# Patient Record
Sex: MALE | Race: WHITE | NOT HISPANIC OR LATINO | Employment: OTHER | URBAN - METROPOLITAN AREA
[De-identification: names, ages, dates, MRNs, and addresses within clinical notes are randomized per-mention and may not be internally consistent; named-entity substitution may affect disease eponyms.]

---

## 2017-02-17 ENCOUNTER — DOCUMENTATION ONLY (OUTPATIENT)
Dept: VASCULAR SURGERY | Facility: CLINIC | Age: 72
End: 2017-02-17

## 2017-06-12 NOTE — PROGRESS NOTES
"  SUBJECTIVE:                                                    Iam Kaur is a 72 year old male who presents to clinic today for the following health issues:      HPI  He has a history of chronic loose stools, 5-10 small stools per day, usually formed but can be loose, for many years since his rectal cancer 17 years ago. He takes Lomotil as needed, usually 3 times weekly which does help decrease the amount of stools he is having. He also associates more frequent bowel movements with fatty, spicy foods. He denies any associated nausea, vomiting, or abdominal pain. He does continue to drink 3 [place]/water glasses per day but states he has cut back.     He has an occasional flare of low back pain and sciatica but this will improve with diclofenac which he only takes as needed.     Problem list and histories reviewed & adjusted, as indicated.  Additional history: none    ROS:  GENERAL: Denies fever, fatigue, weakness, weight gain, or weight loss.  CARDIOVASCULAR: Denies chest pain, shortness of breath, irregular heartbeats,  palpitations, or edema.  RESPIRATORY: Denies cough, hemoptysis, and shortness of breath.  GASTROINTESTINAL: +5-10 bowel movements per day. Denies nausea, vomiting, change in appetite, abdominal pain, or constipation.  GENITOURINARY: Denies increased frequency, urgency, dysuria, hematuria, or incontinence.   MUSCULOSKELETAL: +Intermittent low back pain.   NEUROLOGIC: Denies headache, fainting, dizziness, memory loss, numbness, tingling, or seizures.    OBJECTIVE:                                                    /80  Pulse 92  Temp 98.5  F (36.9  C) (Temporal)  Resp 16  Ht 5' 7\" (1.702 m)  Wt 231 lb (104.8 kg)  BMI 36.18 kg/m2  Body mass index is 36.18 kg/(m^2).  GENERAL: healthy, alert and no distress  RESP: lungs clear to auscultation - no rales, rhonchi or wheezes  CV: regular rate and rhythm, normal S1 S2, no S3 or S4, no murmur, click or rub  ABDOMEN: soft, nontender, no " hepatosplenomegaly, no masses and bowel sounds normal  NEURO: Normal strength and tone, mentation intact and speech normal. Cranial nerves II-XII are grossly intact. DTRs are 2+/4 throughout and symmetric. Gait is stable.        ASSESSMENT/PLAN:                                                        ICD-10-CM    1. Frequent bowel movements R19.4    2. Chronic diarrhea K52.9 diphenoxylate-atropine (LOMOTIL) 2.5-0.025 MG per tablet   3. 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 Essential hypertension with goal blood pressure less than 140/90 I10 lisinopril (PRINIVIL/ZESTRIL) 10 MG tablet     Comprehensive metabolic panel (BMP + Alb, Alk Phos, ALT, AST, Total. Bili, TP)     CANCELED: BASIC METABOLIC PANEL   5. Alcohol use Z78.9    6. Malignant neoplasm of rectum (H) C20    7. Obesity, unspecified obesity severity, unspecified obesity type E66.9        1-2, 5-6. These symptoms have been occurring for years (since his rectal cancer surgery) and seems to be exacerbated with fatty, spicy foods so I recommend limit these types of foods. He had a normal colonoscopy last year with a benign polyp. I do recommend he cut back on his alcohol use as this can also worsen his bowel symptoms. He will continue with Lomotil as needed as this works well.    3. Stable, continue diclofenac as needed for pain flares.    4. Stable, continue current medications. Non-fasting CMP ordered.    7. Discussed the importance of a healthier, low fat diet with 3-5 days per week of exercise.    Follow up in 5-6 months for annual physical.     Barak Cameron PA-C  [facility]

## 2017-06-14 ENCOUNTER — OFFICE VISIT (OUTPATIENT)
Dept: FAMILY MEDICINE | Facility: OTHER | Age: 72
End: 2017-06-14
Payer: COMMERCIAL

## 2017-06-14 VITALS
TEMPERATURE: 98.5 F | BODY MASS INDEX: 36.26 KG/M2 | WEIGHT: 231 LBS | RESPIRATION RATE: 16 BRPM | HEART RATE: 92 BPM | DIASTOLIC BLOOD PRESSURE: 80 MMHG | SYSTOLIC BLOOD PRESSURE: 130 MMHG | HEIGHT: 67 IN

## 2017-06-14 DIAGNOSIS — C20 MALIGNANT NEOPLASM OF RECTUM (H): ICD-10-CM

## 2017-06-14 DIAGNOSIS — K52.9 CHRONIC DIARRHEA: ICD-10-CM

## 2017-06-14 DIAGNOSIS — M54.17 LUMBOSACRAL NEURITIS: ICD-10-CM

## 2017-06-14 DIAGNOSIS — E66.9 OBESITY, UNSPECIFIED OBESITY SEVERITY, UNSPECIFIED OBESITY TYPE: ICD-10-CM

## 2017-06-14 DIAGNOSIS — Z78.9 ALCOHOL USE: ICD-10-CM

## 2017-06-14 DIAGNOSIS — R19.4 FREQUENT BOWEL MOVEMENTS: Primary | ICD-10-CM

## 2017-06-14 DIAGNOSIS — I10 ESSENTIAL HYPERTENSION WITH GOAL BLOOD PRESSURE LESS THAN 140/90: ICD-10-CM

## 2017-06-14 LAB
ALBUMIN SERPL-MCNC: 3.7 G/DL (ref 3.4–5)
ALP SERPL-CCNC: 80 U/L (ref 40–150)
ALT SERPL W P-5'-P-CCNC: 38 U/L (ref 0–70)
ANION GAP SERPL CALCULATED.3IONS-SCNC: 8 MMOL/L (ref 3–14)
AST SERPL W P-5'-P-CCNC: 16 U/L (ref 0–45)
BILIRUB SERPL-MCNC: 0.4 MG/DL (ref 0.2–1.3)
BUN SERPL-MCNC: 21 MG/DL (ref 7–30)
CALCIUM SERPL-MCNC: 8.8 MG/DL (ref 8.5–10.1)
CHLORIDE SERPL-SCNC: 106 MMOL/L (ref 94–109)
CO2 SERPL-SCNC: 25 MMOL/L (ref 20–32)
CREAT SERPL-MCNC: 0.82 MG/DL (ref 0.66–1.25)
GFR SERPL CREATININE-BSD FRML MDRD: ABNORMAL ML/MIN/1.7M2
GLUCOSE SERPL-MCNC: 115 MG/DL (ref 70–99)
POTASSIUM SERPL-SCNC: 4.4 MMOL/L (ref 3.4–5.3)
PROT SERPL-MCNC: 7.4 G/DL (ref 6.8–8.8)
SODIUM SERPL-SCNC: 139 MMOL/L (ref 133–144)

## 2017-06-14 PROCEDURE — 80053 COMPREHEN METABOLIC PANEL: CPT | Performed by: PHYSICIAN ASSISTANT

## 2017-06-14 PROCEDURE — 99214 OFFICE O/P EST MOD 30 MIN: CPT | Performed by: PHYSICIAN ASSISTANT

## 2017-06-14 PROCEDURE — 36415 COLL VENOUS BLD VENIPUNCTURE: CPT | Performed by: PHYSICIAN ASSISTANT

## 2017-06-14 RX ORDER — DICLOFENAC SODIUM 75 MG/1
TABLET, DELAYED RELEASE ORAL
Qty: 60 TABLET | Refills: 1 | Status: CANCELLED | OUTPATIENT
Start: 2017-06-14

## 2017-06-14 RX ORDER — DIPHENOXYLATE HCL/ATROPINE 2.5-.025MG
2 TABLET ORAL 4 TIMES DAILY PRN
Qty: 30 TABLET | Refills: 1 | Status: SHIPPED | OUTPATIENT
Start: 2017-06-14 | End: 2017-12-01

## 2017-06-14 RX ORDER — LISINOPRIL 10 MG/1
10 TABLET ORAL DAILY
Qty: 90 TABLET | Refills: 3 | Status: SHIPPED | OUTPATIENT
Start: 2017-06-14 | End: 2018-08-06

## 2017-06-14 ASSESSMENT — PAIN SCALES - GENERAL: PAINLEVEL: MODERATE PAIN (5)

## 2017-06-14 NOTE — NURSING NOTE
"Chief Complaint   Patient presents with     Hypertension     Diarrhea     Panel Management     bmp, height       Initial /80  Pulse 92  Temp 98.5  F (36.9  C) (Temporal)  Resp 16  Ht 5' 7\" (1.702 m)  Wt 231 lb (104.8 kg)  BMI 36.18 kg/m2 Estimated body mass index is 36.18 kg/(m^2) as calculated from the following:    Height as of this encounter: 5' 7\" (1.702 m).    Weight as of this encounter: 231 lb (104.8 kg).  Medication Reconciliation: complete     Michelle Stark CMA (AAMA)      "

## 2017-06-14 NOTE — PATIENT INSTRUCTIONS
Continue current medications including Lomotil as needed for diarrhea/increased stools.     Try to cut back on the alcohol and avoid the greasy, spicy foods as this can make your bowel movements worse/more frequent.    Continue with diclofenac as needed for back pain.    Follow up in 6 months before returning to [place].

## 2017-06-14 NOTE — MR AVS SNAPSHOT
After Visit Summary   6/14/2017    Iam Kaur    MRN: [id number]           Patient Information     Date Of Birth          1945        Visit Information        Provider Department      6/14/2017 11:15 AM Barak Cameron PA-C [facility]        Today's Diagnoses     Alcohol use    -  1    Frequent bowel movements        Chronic diarrhea        Lumbosacral neuritis        Essential hypertension with goal blood pressure less than 140/90        Malignant neoplasm of rectum (H)        Obesity, unspecified obesity severity, unspecified obesity type          Care Instructions    Continue current medications including Lomotil as needed for diarrhea/increased stools.     Try to cut back on the alcohol and avoid the greasy, spicy foods as this can make your bowel movements worse/more frequent.    Continue with diclofenac as needed for back pain.    Follow up in 6 months before returning to [place].           Follow-ups after your visit        Who to contact     If you have questions or need follow up information about today's clinic visit or your schedule please contact [facility] directly at [phone].  Normal or non-critical lab and imaging results will be communicated to you by rocket staffhart, letter or phone within 4 business days after the clinic has received the results. If you do not hear from us within 7 days, please contact the clinic through [employer] or phone. If you have a critical or abnormal lab result, we will notify you by phone as soon as possible.  Submit refill requests through [employer] or call your pharmacy and they will forward the refill request to us. Please allow 3 business days for your refill to be completed.          Additional Information About Your Visit        rocket staffhart Information     [employer] gives you secure access to your electronic health record. If you see a primary care provider, you can also send messages to your care team and make  "appointments. If you have questions, please call your primary care clinic.  If you do not have a primary care provider, please call [phone] and they will assist you.        Care EveryWhere ID     This is your Care EveryWhere ID. This could be used by other organizations to access your [facility] medical records  [id number]        Your Vitals Were     Pulse Temperature Respirations Height BMI (Body Mass Index)       92 98.5  F (36.9  C) (Temporal) 16 5' 7\" (1.702 m) 36.18 kg/m2        Blood Pressure from Last 3 Encounters:   06/14/17 130/80   11/30/16 130/82   08/23/16 134/88    Weight from Last 3 Encounters:   06/14/17 231 lb (104.8 kg)   11/30/16 237 lb (107.5 kg)   08/09/16 230 lb (104.3 kg)              We Performed the Following     BASIC METABOLIC PANEL          Where to get your medicines      These medications were sent to [id number] [address] [facility] AT [facility] [address]  [address] [facility], [place] [address]     Phone:  [phone]     lisinopril 10 MG tablet         Some of these will need a paper prescription and others can be bought over the counter.  Ask your nurse if you have questions.     Bring a paper prescription for each of these medications     diphenoxylate-atropine 2.5-0.025 MG per tablet          Primary Care Provider Office Phone # Fax #    Barak Cameron PA-C [phone]       [facility] [address]  [place] [address]        Thank you!     Thank you for choosing [facility]  for your care. Our goal is always to provide you with excellent care. Hearing back from our patients is one way we can continue to improve our services. Please take a few minutes to complete the written survey that you may receive in the mail after your visit with us. Thank you!             Your Updated Medication List - Protect others around you: Learn how to safely use, store and throw away your medicines " at www.disposemymeds.org.          This list is accurate as of: 6/14/17 11:44 AM.  Always use your most recent med list.                   Brand Name Dispense Instructions for use    aspirin 81 MG tablet     0    ONE DAILY       diclofenac 75 MG EC tablet    VOLTAREN    60 tablet    1 tab po BID prn       diphenoxylate-atropine 2.5-0.025 MG per tablet    LOMOTIL    30 tablet    Take 2 tablets by mouth 4 times daily as needed for diarrhea       lisinopril 10 MG tablet    PRINIVIL/ZESTRIL    90 tablet    Take 1 tablet (10 mg) by mouth daily       Multi-vitamin Tabs tablet      Take 1 tablet by mouth daily       psyllium 58.6 % Powd    METAMUCIL     Take by mouth daily Gummy fiber       sildenafil 50 MG cap/tab    VIAGRA    12 tablet    Take 1 tablet by mouth daily as needed.

## 2017-08-09 DIAGNOSIS — G47.33 OBSTRUCTIVE SLEEP APNEA (ADULT) (PEDIATRIC): Primary | ICD-10-CM

## 2017-08-14 ENCOUNTER — OFFICE VISIT (OUTPATIENT)
Dept: FAMILY MEDICINE | Facility: OTHER | Age: 72
End: 2017-08-14
Payer: COMMERCIAL

## 2017-08-14 VITALS — HEART RATE: 93 BPM | SYSTOLIC BLOOD PRESSURE: 125 MMHG | DIASTOLIC BLOOD PRESSURE: 85 MMHG

## 2017-08-14 DIAGNOSIS — D23.5 BENIGN NEOPLASM OF SKIN OF TRUNK, EXCEPT SCROTUM: ICD-10-CM

## 2017-08-14 DIAGNOSIS — R51.9 RIGHT TEMPORAL HEADACHE: Primary | ICD-10-CM

## 2017-08-14 DIAGNOSIS — R09.89 CHEST CONGESTION: ICD-10-CM

## 2017-08-14 LAB
BASOPHILS # BLD AUTO: 0 10E9/L (ref 0–0.2)
BASOPHILS NFR BLD AUTO: 0.5 %
CRP SERPL-MCNC: <2.9 MG/L (ref 0–8)
DIFFERENTIAL METHOD BLD: ABNORMAL
EOSINOPHIL # BLD AUTO: 0.4 10E9/L (ref 0–0.7)
EOSINOPHIL NFR BLD AUTO: 6.3 %
ERYTHROCYTE [DISTWIDTH] IN BLOOD BY AUTOMATED COUNT: 11.6 % (ref 10–15)
ERYTHROCYTE [SEDIMENTATION RATE] IN BLOOD BY WESTERGREN METHOD: 10 MM/H (ref 0–20)
HCT VFR BLD AUTO: 39.8 % (ref 40–53)
HGB BLD-MCNC: 13.4 G/DL (ref 13.3–17.7)
LYMPHOCYTES # BLD AUTO: 2.1 10E9/L (ref 0.8–5.3)
LYMPHOCYTES NFR BLD AUTO: 35.1 %
MCH RBC QN AUTO: 32.6 PG (ref 26.5–33)
MCHC RBC AUTO-ENTMCNC: 33.7 G/DL (ref 31.5–36.5)
MCV RBC AUTO: 97 FL (ref 78–100)
MONOCYTES # BLD AUTO: 0.6 10E9/L (ref 0–1.3)
MONOCYTES NFR BLD AUTO: 10.5 %
NEUTROPHILS # BLD AUTO: 2.9 10E9/L (ref 1.6–8.3)
NEUTROPHILS NFR BLD AUTO: 47.6 %
PLATELET # BLD AUTO: 234 10E9/L (ref 150–450)
RBC # BLD AUTO: 4.11 10E12/L (ref 4.4–5.9)
WBC # BLD AUTO: 6 10E9/L (ref 4–11)

## 2017-08-14 PROCEDURE — 85652 RBC SED RATE AUTOMATED: CPT | Performed by: PHYSICIAN ASSISTANT

## 2017-08-14 PROCEDURE — 99213 OFFICE O/P EST LOW 20 MIN: CPT | Performed by: PHYSICIAN ASSISTANT

## 2017-08-14 PROCEDURE — 85025 COMPLETE CBC W/AUTO DIFF WBC: CPT | Performed by: PHYSICIAN ASSISTANT

## 2017-08-14 PROCEDURE — 86140 C-REACTIVE PROTEIN: CPT | Performed by: PHYSICIAN ASSISTANT

## 2017-08-14 PROCEDURE — 36415 COLL VENOUS BLD VENIPUNCTURE: CPT | Performed by: PHYSICIAN ASSISTANT

## 2017-08-14 ASSESSMENT — PAIN SCALES - GENERAL: PAINLEVEL: MILD PAIN (3)

## 2017-08-14 NOTE — MR AVS SNAPSHOT
After Visit Summary   8/14/2017    Iam Kaur    MRN: [id number]           Patient Information     Date Of Birth          1945        Visit Information        Provider Department      8/14/2017 9:45 AM Barak Cameron PA-C [facility]        Today's Diagnoses     Right temporal headache    -  1    Benign neoplasm of skin of trunk, except scrotum        Chest congestion          Care Instructions    I think your headache is due to stress and nasal congestion.  Continue with ibuprofen as needed along with rest.  I would recommend trying a daily antihistamine like Claritin for a few weeks and also try Flonase nasal spray.  If the headaches is worsening or changing, let me know.    I do not think the spot on your nose is anything to worry about but monitor closely for any changes.           Follow-ups after your visit        Follow-up notes from your care team     Return if symptoms worsen or fail to improve.      Who to contact     If you have questions or need follow up information about today's clinic visit or your schedule please contact [facility] directly at [phone].  Normal or non-critical lab and imaging results will be communicated to you by [employer], letter or phone within 4 business days after the clinic has received the results. If you do not hear from us within 7 days, please contact the clinic through [employer] or phone. If you have a critical or abnormal lab result, we will notify you by phone as soon as possible.  Submit refill requests through [employer] or call your pharmacy and they will forward the refill request to us. Please allow 3 business days for your refill to be completed.          Additional Information About Your Visit        TIM [employer] Information     [employer] gives you secure access to your electronic health record. If you see a primary care provider, you can also send messages to your care team and make appointments. If you  have questions, please call your primary care clinic.  If you do not have a primary care provider, please call [phone] and they will assist you.        Care EveryWhere ID     This is your Care EveryWhere ID. This could be used by other organizations to access your [place] medical records  [id number]        Your Vitals Were     Pulse                   93            Blood Pressure from Last 3 Encounters:   08/14/17 125/85   06/14/17 130/80   11/30/16 130/82    Weight from Last 3 Encounters:   06/14/17 231 lb (104.8 kg)   11/30/16 237 lb (107.5 kg)   08/09/16 230 lb (104.3 kg)              We Performed the Following     CBC with platelets and differential     CRP, inflammation     ESR: Erythrocyte sedimentation rate        Primary Care Provider Office Phone # Fax #    Barak Cameron PA-C [phone]       [address]  [facility] [address]        Equal Access to Services     SUSHIL COPE : Hadii natacha ku hadasho Soantonio, waaxda luqadaha, qaybta kaalmada adeegyada, fidelia doss . So [facility] [place] [phone].    ATENCIÓN: Si habla español, tiene a em disposición servicios gratuitos de asistencia lingüística. Llame al [phone].    We comply with applicable federal civil rights laws and [place] laws. We do not discriminate on the basis of race, color, national origin, age, disability sex, sexual orientation or gender identity.            Thank you!     Thank you for choosing [facility]  for your care. Our goal is always to provide you with excellent care. Hearing back from our patients is one way we can continue to improve our services. Please take a few minutes to complete the written survey that you may receive in the mail after your visit with us. Thank you!             Your Updated Medication List - Protect others around you: Learn how to safely use, store and throw away your medicines at www.disposemymeds.org.          This list is accurate  as of: 8/14/17  9:59 AM.  Always use your most recent med list.                   Brand Name Dispense Instructions for use Diagnosis    aspirin 81 MG tablet     0    ONE DAILY    Routine general medical examination at a health care facility       diclofenac 75 MG EC tablet    VOLTAREN    60 tablet    1 tab po BID prn    Lumbosacral neuritis       diphenoxylate-atropine 2.5-0.025 MG per tablet    LOMOTIL    30 tablet    Take 2 tablets by mouth 4 times daily as needed for diarrhea    Chronic diarrhea       lisinopril 10 MG tablet    PRINIVIL/ZESTRIL    90 tablet    Take 1 tablet (10 mg) by mouth daily    Essential hypertension with goal blood pressure less than 140/90       Multi-vitamin Tabs tablet      Take 1 tablet by mouth daily        psyllium 58.6 % Powd    METAMUCIL     Take by mouth daily Gummy fiber        sildenafil 50 MG cap/tab    VIAGRA    12 tablet    Take 1 tablet by mouth daily as needed.    Erectile dysfunction

## 2017-08-14 NOTE — PROGRESS NOTES
SUBJECTIVE:                                                    Iam Kaur is a 72 year old male who presents to clinic today for the following health issues:    HPI    Headache  Onset: 3 days ago    Description:   Location: unilateral in the right temporal area   Character: sharp pain  Frequency:  constant    Intensity: 3-4/10 currently, gets up to 8-9/10    Progression of Symptoms:  same    Accompanying Signs & Symptoms:  Stiff neck: no   Neck or upper back pain: no   Fever: no   Sinus pressure: YES  Nausea or vomiting: no   Dizziness: no   Numbness: no   Weakness: no   Visual changes: no     History:   Head trauma: no   Family history of migraines: Dad used to get them  Previous tests for headaches: no   Neurologist evaluations: no   Able to do daily activities: YES  Wake with a headaches: no   Do headaches wake you up: no   Daily pain medication use: not usually  Work/school stressors/changes: YES- brother just  4 days ago    Precipitating factors:   Does light make it worse: YES  Does sound make it worse: YES  Laying down hurts worse    Alleviating factors:  Does sleep help: YES  Pressure helps    Therapies Tried and outcome: Ibuprofen (Advil, Motrin)      The pain was worse a few days ago but is improved today after taking ibuprofen. He has been more congested over the past few weeks as well and is coughing up some phlegm. He denies any fevers but has had occasional chills. No visual changes, speech changes, or extremity weakness. His brother passed away a few days ago so this has been causing more stress.     Spot on right side of nose that has been present for 1 year and has slightly grown.    Problem list and histories reviewed & adjusted, as indicated.  Additional history: as documented    ROS:  GENERAL: Denies fever, fatigue, weakness, weight gain, or weight loss.  HEENT: Eyes-Denies pain, redness, loss of vision, double or blurred vision.     Ears/Nose- +Nasal congestion. Denies tinnitus,  loss of hearing, epistaxis, decreased sense of smell. Denies loss of sense of taste, dry mouth, or sore throat.   CARDIOVASCULAR: Denies chest pain, shortness of breath, irregular heartbeats, palpitations, or edema.  RESPIRATORY: Denies cough, hemoptysis, and shortness of breath.  NEUROLOGIC: +Right temporal headache. Denies fainting, dizziness, memory loss, numbness, tingling, or seizures.    OBJECTIVE:     /85  Pulse 93  There is no height or weight on file to calculate BMI.  GENERAL: healthy, alert and no distress  EYES: Eyes grossly normal to inspection, PERRL and conjunctivae and sclerae normal  HENT: ear canals and TM's normal, nose and mouth without ulcers or lesions. Normal temporal pulses bilaterally. Mild tenderness over right temporal area.   NECK: no adenopathy, no asymmetry, masses, or scars and thyroid normal to palpation  RESP: lungs clear to auscultation - no rales, rhonchi or wheezes  CV: regular rate and rhythm, normal S1 S2, no S3 or S4, no murmur, click or rub, no peripheral edema   SKIN: Small, dark macule over right side of nose.   NEURO: Normal strength and tone, mentation intact and speech normal. Cranial nerves II-XII are grossly intact. DTRs are 2+/4 throughout and symmetric. Gait is stable.     Diagnostic Test Results:  Results for orders placed or performed in visit on 08/14/17   ESR: Erythrocyte sedimentation rate   Result Value Ref Range    Sed Rate 10 0 - 20 mm/h   CBC with platelets and differential   Result Value Ref Range    WBC 6.0 4.0 - 11.0 10e9/L    RBC Count 4.11 (L) 4.4 - 5.9 10e12/L    Hemoglobin 13.4 13.3 - 17.7 g/dL    Hematocrit 39.8 (L) 40.0 - 53.0 %    MCV 97 78 - 100 fl    MCH 32.6 26.5 - 33.0 pg    MCHC 33.7 31.5 - 36.5 g/dL    RDW 11.6 10.0 - 15.0 %    Platelet Count 234 150 - 450 10e9/L    Diff Method Automated Method     % Neutrophils 47.6 %    % Lymphocytes 35.1 %    % Monocytes 10.5 %    % Eosinophils 6.3 %    % Basophils 0.5 %    Absolute Neutrophil 2.9  1.6 - 8.3 10e9/L    Absolute Lymphocytes 2.1 0.8 - 5.3 10e9/L    Absolute Monocytes 0.6 0.0 - 1.3 10e9/L    Absolute Eosinophils 0.4 0.0 - 0.7 10e9/L    Absolute Basophils 0.0 0.0 - 0.2 10e9/L       ASSESSMENT/PLAN:       ICD-10-CM    1. Right temporal headache R51 ESR: Erythrocyte sedimentation rate     CRP, inflammation     CBC with platelets and differential   2. Benign neoplasm of skin of trunk, except scrotum D23.5    3. Chest congestion R09.89        I think headache is due to stress and nasal congestion.  ESR and CBC are normal.   Continue with ibuprofen as needed along with rest.  I would recommend trying a daily antihistamine like Claritin for a few weeks and also try Flonase nasal spray.  If the headache is worsening or changing, he will let me know.    I do not think the spot on his nose is anything to worry about as it appears to be a benign hemangioma. If he notices any more growth or other changes, he will let me know.         Barak Cameron PA-C  [facility]

## 2017-08-14 NOTE — PATIENT INSTRUCTIONS
I think your headache is due to stress and nasal congestion.  Continue with ibuprofen as needed along with rest.  I would recommend trying a daily antihistamine like Claritin for a few weeks and also try Flonase nasal spray.  If the headaches is worsening or changing, let me know.    I do not think the spot on your nose is anything to worry about but monitor closely for any changes.

## 2017-11-24 NOTE — PROGRESS NOTES
SUBJECTIVE:   Iam Kaur is a 72 year old male who presents for Preventive Visit.    Are you in the first 12 months of your Medicare coverage?  No    Physical   Annual:     Getting at least 3 servings of Calcium per day::  Yes    Bi-annual eye exam::  NO    Dental care twice a year::  Yes    Sleep apnea or symptoms of sleep apnea::  Sleep apnea    Diet::  Regular (no restrictions)    Frequency of exercise::  6-7 days/week    Duration of exercise::  15-30 minutes    Taking medications regularly::  Yes    Medication side effects::  Not applicable    Additional concerns today::  No    Still has numbness of left great toe for many years. Has a history of chronic low back pain with left sided radiculopathy.      COGNITIVE SCREEN  1) Repeat 3 items (Banana, Sunrise, Chair)    2) Clock draw: NORMAL  3) 3 item recall: Recalls 2 objects   Results: NORMAL clock, 1-2 items recalled: COGNITIVE IMPAIRMENT LESS LIKELY    Mini-CogTM Copyright BALBIR Carpenter. Licensed by the author for use in [facility]; reprinted with permission (bry@[employer]). All rights reserved.        Reviewed and updated as needed this visit by clinical staff  Tobacco  Allergies  Meds  Problems  Med Hx  Surg Hx  Fam Hx  Soc Hx          Reviewed and updated as needed this visit by Provider  Allergies  Meds  Problems        Social History   Substance Use Topics     Smoking status: Never Smoker     Smokeless tobacco: Never Used      Comment: No smokers in home     Alcohol use Yes      Comment: 21 drinks a week       The patient does not drink >3 drinks per day nor >7 drinks per week.      Today's PHQ-2 Score:   PHQ-2 ( 1999 [employer]) 12/1/2017   Q1: Little interest or pleasure in doing things 0   Q2: Feeling down, depressed or hopeless 0   PHQ-2 Score 0   Q1: Little interest or pleasure in doing things Not at all   Q2: Feeling down, depressed or hopeless Not at all   PHQ-2 Score 0     Answers for HPI/ROS submitted by the patient on  12/1/2017   PHQ-2 Score: 0    Do you feel safe in your environment - Yes    Do you have a Health Care Directive?: Yes: Patient states has Advance Directive and will bring in a copy to clinic.    Current providers sharing in care for this patient include:   Patient Care Team:  Barak Cameron PA-C as PCP - General (Physician Assistant)      Hearing impairment: No    Ability to successfully perform activities of daily living: Yes, no assistance needed     Fall risk:  Fallen 2 or more times in the past year?: No  Any fall with injury in the past year?: No      Home safety:  none identified      The following health maintenance items are reviewed in Epic and correct as of today:  Health Maintenance   Topic Date Due     ADVANCE DIRECTIVE PLANNING Q5 YRS  10/31/2017     LIPID MONITORING Q1 YEAR  11/30/2017     FALL RISK ASSESSMENT  11/30/2017     BMP Q1 YR  06/14/2018     TETANUS IMMUNIZATION (SYSTEM ASSIGNED)  07/14/2018     COLONOSCOPY Q3 YR  06/27/2019     INFLUENZA VACCINE (SYSTEM ASSIGNED)  Completed     PNEUMOCOCCAL  Completed     AORTIC ANEURYSM SCREENING (SYSTEM ASSIGNED)  Completed       Review of Systems   Constitutional: Negative.  Negative for chills and fever.   HENT: Negative.  Negative for congestion, ear pain, hearing loss and sore throat.    Eyes: Negative.  Negative for pain and visual disturbance.   Respiratory: Negative.  Negative for cough and shortness of breath.    Cardiovascular: Negative.  Negative for chest pain, palpitations and peripheral edema.   Gastrointestinal: Negative.  Negative for abdominal pain, constipation, diarrhea, heartburn, hematochezia and nausea.   Endocrine: Negative.    Genitourinary: Negative.  Negative for discharge, dysuria, frequency, genital sores, hematuria, impotence and urgency.   Musculoskeletal: Negative.  Negative for arthralgias, joint swelling and myalgias.   Skin: Negative for rash.   Allergic/Immunologic: Negative.    Neurological: Negative.  Negative for  "dizziness, weakness, headaches and paresthesias.   Hematological: Negative.    Psychiatric/Behavioral: Negative.  Negative for mood changes. The patient is not nervous/anxious.      OBJECTIVE:   /78 (BP Location: Right arm, Patient Position: Chair, Cuff Size: Adult Regular)  Pulse 92  Temp 98.4  F (36.9  C) (Temporal)  Resp 18  Ht 5' 6.5\" (1.689 m)  Wt 238 lb (108 kg)  SpO2 97%  BMI 37.84 kg/m2 Estimated body mass index is 37.84 kg/(m^2) as calculated from the following:    Height as of this encounter: 5' 6.5\" (1.689 m).    Weight as of this encounter: 238 lb (108 kg).  Physical Exam  GENERAL: healthy, alert and no distress  EYES: Eyes grossly normal to inspection, PERRL and conjunctivae and sclerae normal  HENT: ear canals and TM's normal, nose and mouth without ulcers or lesions  NECK: no adenopathy, no asymmetry, masses, or scars and thyroid normal to palpation  RESP: lungs clear to auscultation - no rales, rhonchi or wheezes  CV: regular rate and rhythm, normal S1 S2, no S3 or S4, no murmur, click or rub, no peripheral edema. PT and DP pulses difficult to palpate.   ABDOMEN: soft, nontender, no hepatosplenomegaly, no masses and bowel sounds normal   (male): normal male circumcised genitalia without lesions or urethral discharge, no hernia  MS: no gross musculoskeletal defects noted, no edema. FROM to all extremities. No spinal tenderness.   SKIN: 1.5 mm darkened/purplish macule over right side of nose. Larger purple macule over right thigh.   NEURO: Normal strength and tone, mentation intact and speech normal. Cranial nerves II-XII are grossly intact. DTRs are 2+/4 throughout and symmetric. Gait is stable.  PSYCH: mentation appears normal, affect normal/bright      Results for orders placed or performed in visit on 12/01/17   Hemoglobin A1c   Result Value Ref Range    Hemoglobin A1C 5.8 4.3 - 6.0 %       ASSESSMENT / PLAN:       ICD-10-CM    1. Encounter for routine adult health examination " without abnormal findings Z00.00    2. Class 2 obesity without serious comorbidity with body mass index (BMI) of 37.0 to 37.9 in adult, unspecified obesity type E66.9     Z68.37    3. Essential hypertension I10    4. Hyperlipidemia LDL goal <130 E78.5 Lipid panel reflex to direct LDL Fasting     atorvastatin (LIPITOR) 10 MG tablet   5. Alcohol use Z78.9    6. Impaired fasting glucose R73.01 Hemoglobin A1c   7. Skin lesion L98.9 DERMATOLOGY REFERRAL   8. Obstructive sleep apnea syndrome G47.33    9. Hemangioma D18.00 DERMATOLOGY REFERRAL   10. Chronic diarrhea K52.9 diphenoxylate-atropine (LOMOTIL) 2.5-0.025 MG per tablet       2, 4. Updated lipid panel ordered but based on his ASCVD score, I strongly recommend a statin so will start him on Lipitor 10 mg daily. He is already on a daily aspirin. He is leaving for [place] tomorrow until May so will plan on repeating a lipid panel when he comes back. I also discussed the importance of a healthier diet and routine exercise.  The 10-year ASCVD risk score (Minotola DC Jr, et al., 2013) is: 30.6%    Values used to calculate the score:      Age: 72 years      Sex: Male      Is Non- : No      Diabetic: No      Tobacco smoker: No      Systolic Blood Pressure: 136 mmHg      Is BP treated: Yes      HDL Cholesterol: 41 mg/dL      Total Cholesterol: 220 mg/dL     3. Stable, continue current medications.    5. He has cut back on his nightly alcohol use and I congratulated him on this.    6. A1c is normal so but will continue to work on a healthier lifestyle and weight loss.    7, 9. Darkened macule on nose has been present for over 1 year and has not grown since it was last examined in August. It appears to be hemangioma, although it is not blanchable. With his history of rectal cancer and his family history of skin cancer, I recommend a dermatology referral for further evaluation. He will bring this down to [place] and schedule an appointment. The spot on his  "right thigh is a known hemangioma with previous biopsy in 2011.    8. Stable, continue CPAP.    10. Stable, continue daily Lomotil as needed.    Follow up in 6 months.         End of Life Planning:  Patient currently has an advanced directive: Yes.  Practitioner is supportive of decision.    COUNSELING:  Reviewed preventive health counseling, as reflected in patient instructions       Regular exercise       Healthy diet/nutrition    BP Screening:   Last 3 BP Readings:    BP Readings from Last 3 Encounters:   12/01/17 136/78   08/14/17 125/85   06/14/17 130/80       Estimated body mass index is 37.84 kg/(m^2) as calculated from the following:    Height as of this encounter: 5' 6.5\" (1.689 m).    Weight as of this encounter: 238 lb (108 kg).  Weight management plan: Discussed healthy diet and exercise guidelines and patient will follow up in 6 months in clinic to re-evaluate.   reports that he has never smoked. He has never used smokeless tobacco.        Appropriate preventive services were discussed with this patient, including applicable screening as appropriate for cardiovascular disease, diabetes, osteopenia/osteoporosis, and glaucoma.  As appropriate for age/gender, discussed screening for colorectal cancer, prostate cancer, breast cancer, and cervical cancer. Checklist reviewing preventive services available has been given to the patient.    Reviewed patients plan of care and provided an AVS. The Basic Care Plan (routine screening as documented in Health Maintenance) for Iam meets the Care Plan requirement. This Care Plan has been established and reviewed with the Patient.    Counseling Resources:  ATP IV Guidelines  Pooled Cohorts Equation Calculator  Breast Cancer Risk Calculator  FRAX Risk Assessment  ICSI Preventive Guidelines  Dietary Guidelines for Americans, 2010  USDA's MyPlate  ASA Prophylaxis  Lung CA Screening    Barak Cameron PA-C  [facility]  "

## 2017-11-24 NOTE — PATIENT INSTRUCTIONS
Preventive Health Recommendations:       Male Ages 65 and over    Yearly exam:             See your health care provider every year in order to  o   Review health changes.   o   Discuss preventive care.    o   Review your medicines if your doctor has prescribed any.    Talk with your health care provider about whether you should have a test to screen for prostate cancer (PSA).    Every 3 years, have a diabetes test (fasting glucose). If you are at risk for diabetes, you should have this test more often.    Every 5 years, have a cholesterol test. Have this test more often if you are at risk for high cholesterol or heart disease.     Every 10 years, have a colonoscopy. Or, have a yearly FIT test (stool test). These exams will check for colon cancer.    Talk to with your health care provider about screening for Abdominal Aortic Aneurysm if you have a family history of AAA or have a history of smoking.  Shots:     Get a flu shot each year.     Get a tetanus shot every 10 years.     Talk to your doctor about your pneumonia vaccines. There are now two you should receive - Pneumovax (PPSV 23) and Prevnar (PCV 13).    Talk to your doctor about a shingles vaccine.     Talk to your doctor about the hepatitis B vaccine.  Nutrition:     Eat at least 5 servings of fruits and vegetables each day.     Eat whole-grain bread, whole-wheat pasta and brown rice instead of white grains and rice.     Talk to your doctor about Calcium and Vitamin D.   Lifestyle    Exercise for at least 150 minutes a week (30 minutes a day, 5 days a week). This will help you control your weight and prevent disease.     Limit alcohol to one drink per day.     No smoking.     Wear sunscreen to prevent skin cancer.     See your dentist every six months for an exam and cleaning.     See your eye doctor every 1 to 2 years to screen for conditions such as glaucoma, macular degeneration and cataracts.    I recommend you see a dermatologist (skin specialist) in  [place] to further evaluate the spot on your nose. Monitor for any growth or changes.    I will notify you of your lab results but I would like to start you on a statin medication called Lipitor to help lower your cholesterol. If you develop any muscle aching, let me know.    Follow up when you get back in the Spring to recheck cholesterol.

## 2017-12-01 ENCOUNTER — OFFICE VISIT (OUTPATIENT)
Dept: FAMILY MEDICINE | Facility: OTHER | Age: 72
End: 2017-12-01
Payer: COMMERCIAL

## 2017-12-01 VITALS
TEMPERATURE: 98.4 F | OXYGEN SATURATION: 97 % | SYSTOLIC BLOOD PRESSURE: 136 MMHG | DIASTOLIC BLOOD PRESSURE: 78 MMHG | BODY MASS INDEX: 37.35 KG/M2 | HEIGHT: 67 IN | HEART RATE: 92 BPM | RESPIRATION RATE: 18 BRPM | WEIGHT: 238 LBS

## 2017-12-01 DIAGNOSIS — R73.01 IMPAIRED FASTING GLUCOSE: ICD-10-CM

## 2017-12-01 DIAGNOSIS — L98.9 SKIN LESION: ICD-10-CM

## 2017-12-01 DIAGNOSIS — K52.9 CHRONIC DIARRHEA: ICD-10-CM

## 2017-12-01 DIAGNOSIS — D18.00 HEMANGIOMA: ICD-10-CM

## 2017-12-01 DIAGNOSIS — E66.812 CLASS 2 OBESITY WITHOUT SERIOUS COMORBIDITY WITH BODY MASS INDEX (BMI) OF 37.0 TO 37.9 IN ADULT, UNSPECIFIED OBESITY TYPE: ICD-10-CM

## 2017-12-01 DIAGNOSIS — G47.33 OBSTRUCTIVE SLEEP APNEA SYNDROME: ICD-10-CM

## 2017-12-01 DIAGNOSIS — Z00.00 ENCOUNTER FOR ROUTINE ADULT HEALTH EXAMINATION WITHOUT ABNORMAL FINDINGS: Primary | ICD-10-CM

## 2017-12-01 DIAGNOSIS — I10 ESSENTIAL HYPERTENSION: ICD-10-CM

## 2017-12-01 DIAGNOSIS — Z78.9 ALCOHOL USE: ICD-10-CM

## 2017-12-01 DIAGNOSIS — E78.5 HYPERLIPIDEMIA LDL GOAL <130: ICD-10-CM

## 2017-12-01 LAB
CHOLEST SERPL-MCNC: 210 MG/DL
HBA1C MFR BLD: 5.8 % (ref 4.3–6)
HDLC SERPL-MCNC: 46 MG/DL
LDLC SERPL CALC-MCNC: 142 MG/DL
NONHDLC SERPL-MCNC: 164 MG/DL
TRIGL SERPL-MCNC: 108 MG/DL

## 2017-12-01 PROCEDURE — 99212 OFFICE O/P EST SF 10 MIN: CPT | Mod: 25 | Performed by: PHYSICIAN ASSISTANT

## 2017-12-01 PROCEDURE — 99397 PER PM REEVAL EST PAT 65+ YR: CPT | Performed by: PHYSICIAN ASSISTANT

## 2017-12-01 PROCEDURE — 36415 COLL VENOUS BLD VENIPUNCTURE: CPT | Performed by: PHYSICIAN ASSISTANT

## 2017-12-01 PROCEDURE — 83036 HEMOGLOBIN GLYCOSYLATED A1C: CPT | Performed by: PHYSICIAN ASSISTANT

## 2017-12-01 PROCEDURE — 80061 LIPID PANEL: CPT | Performed by: PHYSICIAN ASSISTANT

## 2017-12-01 RX ORDER — DIPHENOXYLATE HCL/ATROPINE 2.5-.025MG
2 TABLET ORAL 4 TIMES DAILY PRN
Qty: 30 TABLET | Refills: 5 | Status: SHIPPED | OUTPATIENT
Start: 2017-12-01 | End: 2018-06-08

## 2017-12-01 RX ORDER — ATORVASTATIN CALCIUM 10 MG/1
10 TABLET, FILM COATED ORAL DAILY
Qty: 90 TABLET | Refills: 3 | Status: SHIPPED | OUTPATIENT
Start: 2017-12-01 | End: 2018-12-19

## 2017-12-01 ASSESSMENT — ENCOUNTER SYMPTOMS
NAUSEA: 0
HEADACHES: 0
PSYCHIATRIC NEGATIVE: 1
EYE PAIN: 0
JOINT SWELLING: 0
RESPIRATORY NEGATIVE: 1
COUGH: 0
SHORTNESS OF BREATH: 0
EYES NEGATIVE: 1
FREQUENCY: 0
PALPITATIONS: 0
CARDIOVASCULAR NEGATIVE: 1
SORE THROAT: 0
CONSTITUTIONAL NEGATIVE: 1
DIZZINESS: 0
WEAKNESS: 0
CHILLS: 0
DIARRHEA: 0
MUSCULOSKELETAL NEGATIVE: 1
ARTHRALGIAS: 0
FEVER: 0
ABDOMINAL PAIN: 0
MYALGIAS: 0
NERVOUS/ANXIOUS: 0
NEUROLOGICAL NEGATIVE: 1
PARESTHESIAS: 0
GASTROINTESTINAL NEGATIVE: 1
HEMATOLOGIC/LYMPHATIC NEGATIVE: 1
DYSURIA: 0
HEMATURIA: 0
ENDOCRINE NEGATIVE: 1
HEMATOCHEZIA: 0
ALLERGIC/IMMUNOLOGIC NEGATIVE: 1
HEARTBURN: 0
CONSTIPATION: 0

## 2017-12-01 ASSESSMENT — PAIN SCALES - GENERAL: PAINLEVEL: NO PAIN (0)

## 2017-12-01 NOTE — NURSING NOTE
"Chief Complaint   Patient presents with     Physical     Panel Management     height, fall risk, honoring choices, lipid       Initial /78 (BP Location: Right arm, Patient Position: Chair, Cuff Size: Adult Regular)  Pulse 92  Temp 98.4  F (36.9  C) (Temporal)  Resp 18  Ht 5' 6.5\" (1.689 m)  Wt 238 lb (108 kg)  SpO2 97%  BMI 37.84 kg/m2 Estimated body mass index is 37.84 kg/(m^2) as calculated from the following:    Height as of this encounter: 5' 6.5\" (1.689 m).    Weight as of this encounter: 238 lb (108 kg).  Medication Reconciliation: complete     Allison Ramirez CMA      "

## 2017-12-01 NOTE — MR AVS SNAPSHOT
After Visit Summary   12/1/2017    Iam Kaur    MRN: [id number]           Patient Information     Date Of Birth          1945        Visit Information        Provider Department      12/1/2017 8:00 AM Barak Cameron PA-C [facility]        Today's Diagnoses     Encounter for routine adult health examination without abnormal findings    -  1    Class 2 obesity without serious comorbidity with body mass index (BMI) of 37.0 to 37.9 in adult, unspecified obesity type        Essential hypertension        Hyperlipidemia LDL goal <130        Alcohol use        Impaired fasting glucose        Skin lesion        Obstructive sleep apnea syndrome        Hemangioma        Chronic diarrhea          Care Instructions      Preventive Health Recommendations:       Male Ages 65 and over    Yearly exam:             See your health care provider every year in order to  o   Review health changes.   o   Discuss preventive care.    o   Review your medicines if your doctor has prescribed any.    Talk with your health care provider about whether you should have a test to screen for prostate cancer (PSA).    Every 3 years, have a diabetes test (fasting glucose). If you are at risk for diabetes, you should have this test more often.    Every 5 years, have a cholesterol test. Have this test more often if you are at risk for high cholesterol or heart disease.     Every 10 years, have a colonoscopy. Or, have a yearly FIT test (stool test). These exams will check for colon cancer.    Talk to with your health care provider about screening for Abdominal Aortic Aneurysm if you have a family history of AAA or have a history of smoking.  Shots:     Get a flu shot each year.     Get a tetanus shot every 10 years.     Talk to your doctor about your pneumonia vaccines. There are now two you should receive - Pneumovax (PPSV 23) and Prevnar (PCV 13).    Talk to your doctor about a shingles vaccine.      Talk to your doctor about the hepatitis B vaccine.  Nutrition:     Eat at least 5 servings of fruits and vegetables each day.     Eat whole-grain bread, whole-wheat pasta and brown rice instead of white grains and rice.     Talk to your doctor about Calcium and Vitamin D.   Lifestyle    Exercise for at least 150 minutes a week (30 minutes a day, 5 days a week). This will help you control your weight and prevent disease.     Limit alcohol to one drink per day.     No smoking.     Wear sunscreen to prevent skin cancer.     See your dentist every six months for an exam and cleaning.     See your eye doctor every 1 to 2 years to screen for conditions such as glaucoma, macular degeneration and cataracts.    I recommend you see a dermatologist (skin specialist) in [place] to further evaluate the spot on your nose. Monitor for any growth or changes.    I will notify you of your lab results but I would like to start you on a statin medication called Lipitor to help lower your cholesterol. If you develop any muscle aching, let me know.    Follow up when you get back in the Spring to recheck cholesterol.           Follow-ups after your visit        Additional Services     DERMATOLOGY REFERRAL       Your provider has referred you to: Dermatologist in [place]    Dark spot on nose, has not changed since August (present for 1 year) and may be a hemangioma but should be evaluated closer with personal history of rectal cancer and family history of skin cancer    Hemangioma on right thigh that was removed in 2011 (found to be a hemangioma) and has since grown back.    Please be aware that coverage of these services is subject to the terms and limitations of your health insurance plan.  Call member services at your health plan with any benefit or coverage questions.      Please bring the following with you to your appointment:    (1) Any X-Rays, CTs or MRIs which have been performed.  Contact the facility where they were done to  "arrange for  prior to your scheduled appointment.    (2) List of current medications  (3) This referral request   (4) Any documents/labs given to you for this referral                  Follow-up notes from your care team     Return in about 6 months (around 6/1/2018) for Routine Visit, Lab Work.      Who to contact     If you have questions or need follow up information about today's clinic visit or your schedule please contact [facility] directly at [phone].  Normal or non-critical lab and imaging results will be communicated to you by [employer], letter or phone within 4 business days after the clinic has received the results. If you do not hear from us within 7 days, please contact the clinic through [employer] or phone. If you have a critical or abnormal lab result, we will notify you by phone as soon as possible.  Submit refill requests through [employer] or call your pharmacy and they will forward the refill request to us. Please allow 3 business days for your refill to be completed.          Additional Information About Your Visit        [employer] Information     [employer] gives you secure access to your electronic health record. If you see a primary care provider, you can also send messages to your care team and make appointments. If you have questions, please call your primary care clinic.  If you do not have a primary care provider, please call [phone] and they will assist you.        Care EveryWhere ID     This is your Care EveryWhere ID. This could be used by other organizations to access your [place] medical records  [id number]        Your Vitals Were     Pulse Temperature Respirations Height Pulse Oximetry BMI (Body Mass Index)    92 98.4  F (36.9  C) (Temporal) 18 5' 6.5\" (1.689 m) 97% 37.84 kg/m2       Blood Pressure from Last 3 Encounters:   12/01/17 136/78   08/14/17 125/85   06/14/17 130/80    Weight from Last 3 Encounters:   12/01/17 238 lb (108 kg)   06/14/17 231 lb (104.8 " kg)   11/30/16 237 lb (107.5 kg)              We Performed the Following     DERMATOLOGY REFERRAL     Hemoglobin A1c     Lipid panel reflex to direct LDL Fasting          Today's Medication Changes          These changes are accurate as of: 12/1/17  8:36 AM.  If you have any questions, ask your nurse or doctor.               Start taking these medicines.        Dose/Directions    atorvastatin 10 MG tablet   Commonly known as:  LIPITOR   Used for:  Hyperlipidemia LDL goal <130   Started by:  Barak Cameron PA-C        Dose:  10 mg   Take 1 tablet (10 mg) by mouth daily   Quantity:  90 tablet   Refills:  3         Stop taking these medicines if you haven't already. Please contact your care team if you have questions.     sildenafil 50 MG tablet   Commonly known as:  VIAGRA   Stopped by:  Barak Cameron PA-C                Where to get your medicines      These medications were sent to [facility] [address] [place] [address] [facility] AT [facility] [address] [facility]  [address] [facility], [facility] [address]     Phone:  [phone]     atorvastatin 10 MG tablet         Some of these will need a paper prescription and others can be bought over the counter.  Ask your nurse if you have questions.     Bring a paper prescription for each of these medications     diphenoxylate-atropine 2.5-0.025 MG per tablet                Primary Care Provider Office Phone # Fax #    Barak Cameron PA-C [phone]       [address] [facility] [address]  [facility] [address]        Equal Access to Services     MEHRAN COPE AH: Hadii natacha winters Soantonio, waaxda luqadaha, qaybta kaalmada adeegyada, fidelia amezquita. So [facility] [phone].    ATENCIÓN: Si habla español, tiene a em disposición servicios gratuitos de asistencia lingüística. Llame al [phone].    We comply with applicable federal civil rights laws and [place] laws. We do not discriminate on the basis of race, color,  national origin, age, disability, sex, sexual orientation, or gender identity.            Thank you!     Thank you for choosing [facility]  for your care. Our goal is always to provide you with excellent care. Hearing back from our patients is one way we can continue to improve our services. Please take a few minutes to complete the written survey that you may receive in the mail after your visit with us. Thank you!             Your Updated Medication List - Protect others around you: Learn how to safely use, store and throw away your medicines at www.disposemymeds.org.          This list is accurate as of: 12/1/17  8:36 AM.  Always use your most recent med list.                   Brand Name Dispense Instructions for use Diagnosis    aspirin 81 MG tablet     0    ONE DAILY    Routine general medical examination at a health care facility       atorvastatin 10 MG tablet    LIPITOR    90 tablet    Take 1 tablet (10 mg) by mouth daily    Hyperlipidemia LDL goal <130       diclofenac 75 MG EC tablet    VOLTAREN    60 tablet    1 tab po BID prn    Lumbosacral neuritis       diphenoxylate-atropine 2.5-0.025 MG per tablet    LOMOTIL    30 tablet    Take 2 tablets by mouth 4 times daily as needed for diarrhea    Chronic diarrhea       lisinopril 10 MG tablet    PRINIVIL/ZESTRIL    90 tablet    Take 1 tablet (10 mg) by mouth daily    Essential hypertension with goal blood pressure less than 140/90       Multi-vitamin Tabs tablet      Take 1 tablet by mouth daily        psyllium 58.6 % Powd    METAMUCIL     Take by mouth daily Gummy fiber

## 2018-01-12 ENCOUNTER — TELEPHONE (OUTPATIENT)
Dept: FAMILY MEDICINE | Facility: OTHER | Age: 73
End: 2018-01-12

## 2018-01-12 NOTE — TELEPHONE ENCOUNTER
[place]17.    It is unclear if the patient has scheduled yet, not finding a report showing they were seen.     Please forward to your team if further follow up is needed to see if they have made this appointment.      Thank you!   Kellee/Referral Representative for [facility]

## 2018-06-08 DIAGNOSIS — K52.9 CHRONIC DIARRHEA: ICD-10-CM

## 2018-06-08 RX ORDER — DIPHENOXYLATE HCL/ATROPINE 2.5-.025MG
2 TABLET ORAL 4 TIMES DAILY PRN
Qty: 30 TABLET | Refills: 5 | Status: SHIPPED | OUTPATIENT
Start: 2018-06-08 | End: 2019-05-08

## 2018-06-08 NOTE — TELEPHONE ENCOUNTER
Lomotil       Last Written Prescription Date:  12/1/17  Last Fill Quantity: 30,   # refills: 5  Last Office Visit: 12/1/17  Future Office visit:       Routing refill request to provider for review/approval because:  Drug not on the [facility], [facility] or [facility] refill protocol or controlled substance

## 2018-06-25 NOTE — PROGRESS NOTES
"  SUBJECTIVE:   Iam Kaur is a 73 year old male who presents to clinic today for the following health issues:    HPI  Concern - bowel movements  Onset: Years     Description:   Having a bowel movement about 10 times a day and can be solid or loose    Intensity: 0/10    Progression of Symptoms:  same    Accompanying Signs & Symptoms:  none    Previous history of similar problem:   Yes has been dealing with this for years    Precipitating factors:   Worsened by: spicy foods, chinese food    Alleviating factors:  Improved by: Diarrheal medicine helps    Therapies Tried and outcome: diarrheal medicine helps  Problem list and histories reviewed & adjusted, as indicated.  Additional history: none    He has had frequent bowel movements for 20+ years, even before his colon cancer with colectomy in 2000. He states he will go up to 10 times daily if he does not take his Lomotil. They are usually small, solid stools but are occasionally soft/diarrhea if he eats something fatty or spicy. He denies any abdominal pain whatsoever and also denies any nausea, vomiting, constipation, or bloody stools. He usually takes Lomotil in the mornings after a few morning bowel movements which typically keeps him \"good\" for the rest of the day. He has had multiples colonoscopies and endoscopies in the past without any worrisome findings since his colon cancer. He wants to know if there are any new medications out there or if the Lomotil will eventually stop working. There are not any specific foods that increase the stool frequency but spicy foods can cause diarrhea. He tries to drink plenty of fluids throughout the day. He does drink a few glasses of whiskey every night and had tried cutting back on this as well without benefit.     ROS:  GENERAL: Denies fever, fatigue, weakness, weight gain, or weight loss.  CARDIOVASCULAR: Denies chest pain, shortness of breath, irregular heartbeats, palpitations, or edema.  RESPIRATORY: Denies " "cough, hemoptysis, and shortness of breath.  GASTROINTESTINAL: +Frequency bowel movements, occasional diarrhea. Denies nausea, vomiting, change in appetite, abdominal pain, or constipation.  GENITOURINARY: Denies increased frequency, urgency, dysuria, hematuria, or incontinence.   NEUROLOGIC: Denies headache, fainting, dizziness, memory loss, numbness, tingling, or seizures.    OBJECTIVE:     /70 (BP Location: Right arm, Patient Position: Chair, Cuff Size: Adult Regular)  Pulse 86  Temp 98.2  F (36.8  C) (Temporal)  Resp 12  Ht 5' 6.34\" (1.685 m)  Wt 236 lb 12.8 oz (107.4 kg)  BMI 37.83 kg/m2  Body mass index is 37.83 kg/(m^2).  GENERAL: healthy, alert and no distress  RESP: lungs clear to auscultation - no rales, rhonchi or wheezes  CV: regular rate and rhythm, normal S1 S2, no S3 or S4, no murmur, click or rub, no peripheral edema  ABDOMEN: soft, nontender, no hepatosplenomegaly, no masses and bowel sounds normal  PSYCH: mentation appears normal, affect normal/bright      ASSESSMENT/PLAN:       ICD-10-CM    1. Frequent bowel movements R19.4 GASTROENTEROLOGY ADULT REF CONSULT ONLY     Tissue transglutaminase olivier IgA and IgG     TSH with free T4 reflex     CANCELED: Tissue transglutaminase antibody IgA     CANCELED: TSH with free T4 reflex   2. Hyperlipidemia LDL goal <130 E78.5 Lipid panel reflex to direct LDL Fasting         Chronic, frequent bowel movements with rare diarrhea and no abdominal pain. To make a diagnosis of IBS, abdominal pain has to be present so this diagnosis is unlikely. No previous evidence of inflammatory bowel disease. There may be a food allergy/celiac disease so will order anti-TTG antibody along with a TSH (he left before these could be completed so will come back another day). I recommend a FODMAP diet and I explained what this is and provided him with a handout. I also recommend he stay well hydrated a take a daily metamucil supplement to keep his stools consistent. He is due " for an updated colonoscopy so order has been placed and will place referral for a GI consult as well due to the chronicity of his symptoms. He will continue with daily as needed Lomotil for now. He will complete a fasting lipid panel at his convenience.     Greater than 50% of 25 minute visit spent on counseling and plan of care regarding the above.     Barak Cameron PA-C  [facility]

## 2018-06-28 ENCOUNTER — OFFICE VISIT (OUTPATIENT)
Dept: FAMILY MEDICINE | Facility: OTHER | Age: 73
End: 2018-06-28
Payer: COMMERCIAL

## 2018-06-28 VITALS
WEIGHT: 236.8 LBS | TEMPERATURE: 98.2 F | DIASTOLIC BLOOD PRESSURE: 70 MMHG | BODY MASS INDEX: 38.06 KG/M2 | SYSTOLIC BLOOD PRESSURE: 120 MMHG | RESPIRATION RATE: 12 BRPM | HEIGHT: 66 IN | HEART RATE: 86 BPM

## 2018-06-28 DIAGNOSIS — R19.4 FREQUENT BOWEL MOVEMENTS: Primary | ICD-10-CM

## 2018-06-28 DIAGNOSIS — E78.5 HYPERLIPIDEMIA LDL GOAL <130: ICD-10-CM

## 2018-06-28 PROCEDURE — 99214 OFFICE O/P EST MOD 30 MIN: CPT | Performed by: PHYSICIAN ASSISTANT

## 2018-06-28 NOTE — PATIENT INSTRUCTIONS
Work on a FODMAP diet as described in the attachment.  Add a daily metamucil supplement.  Try an oral probiotic instead of Activia yogurt.  Will send you to a GI specialist for further evaluation.

## 2018-06-28 NOTE — MR AVS SNAPSHOT
After Visit Summary   6/28/2018    Iam Kaur    MRN: [id number]           Patient Information     Date Of Birth          1945        Visit Information        Provider Department      6/28/2018 2:00 PM Barak Cameron PA-C [facility]        Today's Diagnoses     Frequent bowel movements    -  1    Hyperlipidemia LDL goal <130          Care Instructions    Work on a FODMAP diet as described in the attachment.  Add a daily metamucil supplement.  Try an oral probiotic instead of Activia yogurt.  Will send you to a GI specialist for further evaluation.              Follow-ups after your visit        Additional Services     GASTROENTEROLOGY ADULT REF CONSULT ONLY       Preferred Location: [facility]: [phone]    Chronic frequent bowel movements, up to 10+ times daily, history of colon cancer      Please be aware that coverage of these services is subject to the terms and limitations of your health insurance plan.  Call member services at your health plan with any benefit or coverage questions.  Any procedures must be performed at a [facility] OR coordinated by your clinic's referral office.    Please bring the following with you to your appointment:    (1) Any X-Rays, CTs or MRIs which have been performed.  Contact the facility where they were done to arrange for  prior to your scheduled appointment.    (2) List of current medications   (3) This referral request   (4) Any documents/labs given to you for this referral                  Follow-up notes from your care team     Return in about 1 year (around 6/28/2019).      Your next 10 appointments already scheduled     Dec 07, 2018  3:00 PM CST   New Visit with Endy Black MD   [facility] ([facility]    [address] [place] [id number] [phone]              Future tests that were ordered for you today     Open Future  "Orders        Priority Expected Expires Ordered    Lipid panel reflex to direct LDL Fasting Routine 6/29/2018 6/28/2019 6/28/2018            Who to contact     If you have questions or need follow up information about today's clinic visit or your schedule please contact [facility] directly at [phone].  Normal or non-critical lab and imaging results will be communicated to you by MyChart, letter or phone within 4 business days after the clinic has received the results. If you do not hear from us within 7 days, please contact the clinic through mPorticohart or phone. If you have a critical or abnormal lab result, we will notify you by phone as soon as possible.  Submit refill requests through [employer] or call your pharmacy and they will forward the refill request to us. Please allow 3 business days for your refill to be completed.          Additional Information About Your Visit        MyChart Information     [employer] gives you secure access to your electronic health record. If you see a primary care provider, you can also send messages to your care team and make appointments. If you have questions, please call your primary care clinic.  If you do not have a primary care provider, please call [phone] and they will assist you.        Care EveryWhere ID     This is your Care EveryWhere ID. This could be used by other organizations to access your [place] medical records  [id number]        Your Vitals Were     Pulse Temperature Respirations Height BMI (Body Mass Index)       86 98.2  F (36.8  C) (Temporal) 12 5' 6.34\" (1.685 m) 37.83 kg/m2        Blood Pressure from Last 3 Encounters:   06/28/18 120/70   12/01/17 136/78   08/14/17 125/85    Weight from Last 3 Encounters:   06/28/18 236 lb 12.8 oz (107.4 kg)   12/01/17 238 lb (108 kg)   06/14/17 231 lb (104.8 kg)              We Performed the Following     GASTROENTEROLOGY ADULT REF CONSULT ONLY     Tissue transglutaminase antibody IgA     TSH with " free T4 reflex        Primary Care Provider Office Phone # Fax #    Barak Cameron PA-C [phone]       [address] [facility] [address]  [facility] [address]        Equal Access to Services     MEHRAN COPE : Hadii aad ku hadleilao Soligiaali, waaxda luqadaha, qaybta kaalmada adeegyada, fidelia elisein hayaakylee arambuladyandaniel amezquita. So [facility] [phone].    ATENCIÓN: Si habla español, tiene a em disposición servicios gratuitos de asistencia lingüística. Llame al [phone].    We comply with applicable federal civil rights laws and [place] laws. We do not discriminate on the basis of race, color, national origin, age, disability, sex, sexual orientation, or gender identity.            Thank you!     Thank you for choosing [facility]  for your care. Our goal is always to provide you with excellent care. Hearing back from our patients is one way we can continue to improve our services. Please take a few minutes to complete the written survey that you may receive in the mail after your visit with us. Thank you!             Your Updated Medication List - Protect others around you: Learn how to safely use, store and throw away your medicines at www.disposemymeds.org.          This list is accurate as of 6/28/18  2:39 PM.  Always use your most recent med list.                   Brand Name Dispense Instructions for use Diagnosis    aspirin 81 MG tablet     0    ONE DAILY    Routine general medical examination at a health care facility       atorvastatin 10 MG tablet    LIPITOR    90 tablet    Take 1 tablet (10 mg) by mouth daily    Hyperlipidemia LDL goal <130       diclofenac 75 MG EC tablet    VOLTAREN    60 tablet    1 tab po BID prn    Lumbosacral neuritis       diphenoxylate-atropine 2.5-0.025 MG per tablet    LOMOTIL    30 tablet    Take 2 tablets by mouth 4 times daily as needed for diarrhea    Chronic diarrhea       lisinopril 10 MG tablet    PRINIVIL/ZESTRIL    90 tablet    Take 1 tablet (10  mg) by mouth daily    Essential hypertension with goal blood pressure less than 140/90       Multi-vitamin Tabs tablet      Take 1 tablet by mouth daily        psyllium 58.6 % Powd    METAMUCIL     Take by mouth daily Gummy fiber

## 2018-06-28 NOTE — NURSING NOTE
"Chief Complaint   Patient presents with     Bathroom Issues     Panel Management     height, tdap, honoring choices, bmp       Initial /70 (BP Location: Right arm, Patient Position: Chair, Cuff Size: Adult Regular)  Pulse 86  Temp 98.2  F (36.8  C) (Temporal)  Resp 12  Ht 5' 6.34\" (1.685 m)  Wt 236 lb 12.8 oz (107.4 kg)  BMI 37.83 kg/m2 Estimated body mass index is 37.83 kg/(m^2) as calculated from the following:    Height as of this encounter: 5' 6.34\" (1.685 m).    Weight as of this encounter: 236 lb 12.8 oz (107.4 kg).  BP completed using cuff size: regular    No obstetric history on file.    The following HM Due: NONE      The following patient reported/Care Every where data was sent to:  P ABSTRACT QUALITY INITIATIVES [08600]       N/a    Sita Kelley, [facility]  June 28, 2018           "

## 2018-06-29 DIAGNOSIS — R19.4 FREQUENT BOWEL MOVEMENTS: ICD-10-CM

## 2018-06-29 DIAGNOSIS — E78.5 HYPERLIPIDEMIA LDL GOAL <130: ICD-10-CM

## 2018-06-29 LAB
CHOLEST SERPL-MCNC: 160 MG/DL
HDLC SERPL-MCNC: 41 MG/DL
LDLC SERPL CALC-MCNC: 98 MG/DL
NONHDLC SERPL-MCNC: 119 MG/DL
TRIGL SERPL-MCNC: 104 MG/DL
TSH SERPL DL<=0.005 MIU/L-ACNC: 2.65 MU/L (ref 0.4–4)

## 2018-06-29 PROCEDURE — 80061 LIPID PANEL: CPT | Performed by: PHYSICIAN ASSISTANT

## 2018-06-29 PROCEDURE — 36415 COLL VENOUS BLD VENIPUNCTURE: CPT | Performed by: PHYSICIAN ASSISTANT

## 2018-06-29 PROCEDURE — 83516 IMMUNOASSAY NONANTIBODY: CPT | Performed by: PHYSICIAN ASSISTANT

## 2018-06-29 PROCEDURE — 84443 ASSAY THYROID STIM HORMONE: CPT | Performed by: PHYSICIAN ASSISTANT

## 2018-06-29 PROCEDURE — 83516 IMMUNOASSAY NONANTIBODY: CPT | Mod: 91 | Performed by: PHYSICIAN ASSISTANT

## 2018-07-02 LAB
TTG IGA SER-ACNC: <1 U/ML
TTG IGG SER-ACNC: <1 U/ML

## 2018-08-02 ENCOUNTER — TELEPHONE (OUTPATIENT)
Dept: GASTROENTEROLOGY | Facility: CLINIC | Age: 73
End: 2018-08-02

## 2018-08-02 NOTE — TELEPHONE ENCOUNTER
PREVISIT INFORMATION                                                    Iam Kaur scheduled for future visit at [facility].    Patient is scheduled to see Dr Delgado on 8/7/18  Reason for visit: Frequent bowels  Referring provider Dr Pepito Cameron  Has patient seen previous specialist? Yes.  Name of provider Dr Aristeo Reynoso, Clinic/Facility [facility].   Medical Records:  Available in chart.  Patient was previously seen at a [place] or [facility] facility.    REVIEW                                                      New patient packet mailed to patient: No  Medication reconciliation complete: No      Current Outpatient Prescriptions   Medication Sig Dispense Refill     ASPIRIN 81 MG OR TABS ONE DAILY 0 0     atorvastatin (LIPITOR) 10 MG tablet Take 1 tablet (10 mg) by mouth daily 90 tablet 3     diclofenac (VOLTAREN) 75 MG EC tablet 1 tab po BID prn 60 tablet 1     diphenoxylate-atropine (LOMOTIL) 2.5-0.025 MG per tablet Take 2 tablets by mouth 4 times daily as needed for diarrhea 30 tablet 5     lisinopril (PRINIVIL/ZESTRIL) 10 MG tablet Take 1 tablet (10 mg) by mouth daily 90 tablet 3     multivitamin, therapeutic with minerals (MULTI-VITAMIN) TABS Take 1 tablet by mouth daily       psyllium (METAMUCIL) 58.6 % POWD Take by mouth daily Gummy fiber         Allergies: Naproxen        PLAN/FOLLOW-UP NEEDED                                                      Previsit review complete.  Patient will see provider at future scheduled appointment. VM left for patient to call clinic back to discuss visit  Patient called this writer back to confirm visit with Dr Delgado     Patient Reminders Given:  Please, make sure you bring an updated list of your medications.   If you are having a procedure, please, present 15 minutes early.  If you need to cancel or reschedule,please call [phone].    Rosendo LEON

## 2018-08-06 DIAGNOSIS — I10 ESSENTIAL HYPERTENSION WITH GOAL BLOOD PRESSURE LESS THAN 140/90: ICD-10-CM

## 2018-08-07 ENCOUNTER — OFFICE VISIT (OUTPATIENT)
Dept: GASTROENTEROLOGY | Facility: CLINIC | Age: 73
End: 2018-08-07
Attending: PHYSICIAN ASSISTANT
Payer: COMMERCIAL

## 2018-08-07 ENCOUNTER — TELEPHONE (OUTPATIENT)
Dept: LAB | Facility: OTHER | Age: 73
End: 2018-08-07

## 2018-08-07 VITALS
HEIGHT: 67 IN | TEMPERATURE: 97.7 F | BODY MASS INDEX: 37.25 KG/M2 | SYSTOLIC BLOOD PRESSURE: 135 MMHG | DIASTOLIC BLOOD PRESSURE: 89 MMHG | HEART RATE: 81 BPM | WEIGHT: 237.3 LBS | OXYGEN SATURATION: 97 %

## 2018-08-07 DIAGNOSIS — E66.01 MORBID OBESITY (H): ICD-10-CM

## 2018-08-07 DIAGNOSIS — R19.5 LOOSE STOOLS: Primary | ICD-10-CM

## 2018-08-07 PROCEDURE — 99204 OFFICE O/P NEW MOD 45 MIN: CPT | Performed by: INTERNAL MEDICINE

## 2018-08-07 RX ORDER — LISINOPRIL 10 MG/1
10 TABLET ORAL DAILY
Qty: 90 TABLET | Refills: 0 | Status: SHIPPED | OUTPATIENT
Start: 2018-08-07 | End: 2018-11-19

## 2018-08-07 ASSESSMENT — PAIN SCALES - GENERAL: PAINLEVEL: NO PAIN (0)

## 2018-08-07 NOTE — NURSING NOTE
"Iam Kaur's goals for this visit include:   Chief Complaint   Patient presents with     Consult     GI concern       He requests these members of his care team be copied on today's visit information: YES    PCP: Barak Cameron    Referring Provider:  Barak Cameron PA-C  [address] [facility] [facility] [address]  [place] [address]    /89 (BP Location: Left arm, Patient Position: Sitting, Cuff Size: Adult Regular)  Pulse 81  Temp 97.7  F (36.5  C) (Oral)  Ht 1.702 m (5' 7\")  Wt 107.6 kg (237 lb 4.8 oz)  SpO2 97%  BMI 37.17 kg/m2    Do you need any medication refills at today's visit? No    "

## 2018-08-07 NOTE — MR AVS SNAPSHOT
After Visit Summary   8/7/2018    Iam Kaur    MRN: [id number]           Patient Information     Date Of Birth          1945        Visit Information        Provider Department      8/7/2018 2:00 PM Rosendo Delgado MD [facility]        Today's Diagnoses     Loose stools    -  1    Morbid obesity (H)          Care Instructions      Obtain Labs and Stool Studies  Start metamucil wafers 1 package daily  Continue imodium as currently taking.  Schedule colonoscopy  Ok to take Acetaminophen (Tylenol) up to 1000mg every 8 hours.  Do this instead of ibuprofen    Psyllium Chewable bars or wafers  Brand Name: Metamucil  What is this medicine?  PSYLLIUM (VIVI i yum) is a bulk-forming fiber laxative. This medicine is used to treat constipation. Increasing fiber in the diet may also help lower cholesterol and promote heart health for some people.  How should I use this medicine?  Take this medicine by mouth with a full glass of water. Follow the directions on the package labeling, or take as directed by your health care professional. Take your medicine at regular intervals. Do not take your medicine more often than directed.  Talk to your pediatrician regarding the use of this medicine in children. While this drug may be prescribed for children as young as 6 years old for selected conditions, precautions do apply.  What side effects may I notice from receiving this medicine?  Side effects that you should report to your doctor or health care professional as soon as possible:    allergic reactions like skin rash, itching or hives, swelling of the face, lips, or tongue    breathing problems    chest pain    nausea, vomiting    rectal bleeding    trouble swallowing  Side effects that usually do not require medical attention (report to your doctor or health care professional if they continue or are bothersome):    bloated or 'gassy' feeling    diarrhea    headache    stomach  cramps  What may interact with this medicine?  Interactions are not expected.  What if I miss a dose?  If you miss a dose, take it as soon as you can. If it is almost time for your next dose, take only that dose. Do not take double or extra doses.  Where should I keep my medicine?  Keep out of the reach of children.  Store at room temperature between 15 and 30 degrees C (59 and 86 degrees F). Protect from moisture. Throw away any unused medicine after the expiration date.  What should I tell my health care provider before I take this medicine?  They need to know if you have any of these conditions:    change in bowel habits for more than 14 days    blocked intestines or bowel    stomach pain, nausea, or vomiting    trouble swallowing    an unusual or allergic reaction to psyllium, other medicines, dyes, or preservatives    pregnant or trying or get pregnant    breast-feeding  What should I watch for while using this medicine?  This medicine can take up to 3 days to work. Check with your doctor or health care professional if your symptoms do not start to get better or if they get worse. See your doctor if you have to treat your constipation for more than 1 week.  Avoid taking other medicines within 2 hours of taking this medicine.  Drink several glasses of water a day while you are taking this medicine. This will help to relieve constipation and prevent dehydration.  NOTE:This sheet is a summary. It may not cover all possible information. If you have questions about this medicine, talk to your doctor, pharmacist, or health care provider. Copyright  2018 Elsevier                Follow-ups after your visit        Additional Services     GASTROENTEROLOGY ADULT REF PROCEDURE ONLY [facility] [phone] ([place]); [facility]       Last Lab Result: Creatinine (mg/dL)       Date                     Value                 06/14/2017               0.82             ----------  Body mass index is 37.17  kg/(m^2).     Needed:  No  Language:  English    Patient will be contacted to schedule procedure.     Please be aware that coverage of these services is subject to the terms and limitations of your health insurance plan.  Call member services at your health plan with any benefit or coverage questions.  Any procedures must be performed at a [place] facility OR coordinated by your clinic's referral office.    Please bring the following with you to your appointment:    (1) Any X-Rays, CTs or MRIs which have been performed.  Contact the facility where they were done to arrange for  prior to your scheduled appointment.    (2) List of current medications   (3) This referral request   (4) Any documents/labs given to you for this referral                  Follow-up notes from your care team     Return in about 3 months (around 11/7/2018).      Your next 10 appointments already scheduled     Aug 08, 2018 10:00 AM CDT   LAB with NL LAB [facility])    [address] [facility] [address] [phone]           Please do not eat 10-12 hours before your appointment if you are coming in fasting for labs on lipids, cholesterol, or glucose (sugar). This does not apply to pregnant women. Water, hot tea and black coffee (with nothing added) are okay. Do not drink other fluids, diet soda or chew gum.              Who to contact     If you have questions or need follow up information about today's clinic visit or your schedule please contact [facility] directly at [phone].  Normal or non-critical lab and imaging results will be communicated to you by MyChart, letter or phone within 4 business days after the clinic has received the results. If you do not hear from us within 7 days, please contact the clinic through MyChart or phone. If you have a critical or abnormal lab result, we will notify you by phone as soon as possible.  Submit refill  "requests through [employer] or call your pharmacy and they will forward the refill request to us. Please allow 3 business days for your refill to be completed.          Additional Information About Your Visit        [employer] Information     [employer] gives you secure access to your electronic health record. If you see a primary care provider, you can also send messages to your care team and make appointments. If you have questions, please call your primary care clinic.  If you do not have a primary care provider, please call [phone] and they will assist you.      [employer] is an electronic gateway that provides easy, online access to your medical records. With [employer], you can request a clinic appointment, read your test results, renew a prescription or communicate with your care team.     To access your existing account, please contact your [facility] or call [phone] for assistance.        Care EveryWhere ID     This is your Care EveryWhere ID. This could be used by other organizations to access your [place] medical records  [id number]        Your Vitals Were     Pulse Temperature Height Pulse Oximetry BMI (Body Mass Index)       81 97.7  F (36.5  C) (Oral) 5' 7\" (1.702 m) 97% 37.17 kg/m2        Blood Pressure from Last 3 Encounters:   08/07/18 135/89   06/28/18 120/70   12/01/17 136/78    Weight from Last 3 Encounters:   08/07/18 237 lb 4.8 oz (107.6 kg)   06/28/18 236 lb 12.8 oz (107.4 kg)   12/01/17 238 lb (108 kg)              We Performed the Following     C. DIFF TOXIN A & B, BY EIA     Calprotectin Feces     CBC with platelets     Comprehensive metabolic panel (BMP + Alb, Alk Phos, ALT, AST, Total. Bili, TP)     GASTROENTEROLOGY ADULT REF PROCEDURE ONLY [facility] [phone] ([place]); [facility]     Tissue transglutaminase olivier IgA and IgG     TSH with free T4 reflex        Primary Care Provider Office Phone # Fax #    Barak Cameron PA-C [phone] " [phone]       [address] [place] [address] [address]  [facility] [address]        Equal Access to Services     MEHRAN COPE : Hadii aad ku hadleilalucy Soantonio, wabhavanada luqmelaha, qaybta kaalmada rayben, waxay idiin haylancekylee arambuladyandaniel amezquita. So [facility] [phone].    ATENCIÓN: Si habla español, tiene a em disposición servicios gratuitos de asistencia lingüística. Hung al [phone].    We comply with applicable federal civil rights laws and [place] laws. We do not discriminate on the basis of race, color, national origin, age, disability, sex, sexual orientation, or gender identity.            Thank you!     Thank you for choosing [facility]  for your care. Our goal is always to provide you with excellent care. Hearing back from our patients is one way we can continue to improve our services. Please take a few minutes to complete the written survey that you may receive in the mail after your visit with us. Thank you!             Your Updated Medication List - Protect others around you: Learn how to safely use, store and throw away your medicines at www.disposemymeds.org.          This list is accurate as of 8/7/18  2:54 PM.  Always use your most recent med list.                   Brand Name Dispense Instructions for use Diagnosis    aspirin 81 MG tablet     0    ONE DAILY    Routine general medical examination at a health care facility       atorvastatin 10 MG tablet    LIPITOR    90 tablet    Take 1 tablet (10 mg) by mouth daily    Hyperlipidemia LDL goal <130       diclofenac 75 MG EC tablet    VOLTAREN    60 tablet    1 tab po BID prn    Lumbosacral neuritis       diphenoxylate-atropine 2.5-0.025 MG per tablet    LOMOTIL    30 tablet    Take 2 tablets by mouth 4 times daily as needed for diarrhea    Chronic diarrhea       lisinopril 10 MG tablet    PRINIVIL/ZESTRIL    90 tablet    Take 1 tablet (10 mg) by mouth daily    Essential hypertension with goal blood pressure less than 140/90        Multi-vitamin Tabs tablet      Take 1 tablet by mouth daily        psyllium 58.6 % Powd    METAMUCIL     Take by mouth daily Gummy fiber

## 2018-08-07 NOTE — TELEPHONE ENCOUNTER
Lab orders in per GI (were not there when encounter was initially routed). Closing encounter    Gita Cheryr MA

## 2018-08-07 NOTE — TELEPHONE ENCOUNTER
Patient is coming in tomorrow for lab only  Please place orders as needed  Thank you  Niurka YUSUF) [facility]

## 2018-08-07 NOTE — TELEPHONE ENCOUNTER
Called patient and informed of message below.  Patient scheduled for lab on 8/8/2018.    Sita Kelley CMA  August 7, 2018

## 2018-08-07 NOTE — PATIENT INSTRUCTIONS
Obtain Labs and Stool Studies  Start metamucil wafers 1 package daily  Continue imodium as currently taking.  Schedule colonoscopy  Ok to take Acetaminophen (Tylenol) up to 1000mg every 8 hours.  Do this instead of ibuprofen    Psyllium Chewable bars or wafers  Brand Name: Metamucil  What is this medicine?  PSYLLIUM (VIVI i yum) is a bulk-forming fiber laxative. This medicine is used to treat constipation. Increasing fiber in the diet may also help lower cholesterol and promote heart health for some people.  How should I use this medicine?  Take this medicine by mouth with a full glass of water. Follow the directions on the package labeling, or take as directed by your health care professional. Take your medicine at regular intervals. Do not take your medicine more often than directed.  Talk to your pediatrician regarding the use of this medicine in children. While this drug may be prescribed for children as young as 6 years old for selected conditions, precautions do apply.  What side effects may I notice from receiving this medicine?  Side effects that you should report to your doctor or health care professional as soon as possible:    allergic reactions like skin rash, itching or hives, swelling of the face, lips, or tongue    breathing problems    chest pain    nausea, vomiting    rectal bleeding    trouble swallowing  Side effects that usually do not require medical attention (report to your doctor or health care professional if they continue or are bothersome):    bloated or 'gassy' feeling    diarrhea    headache    stomach cramps  What may interact with this medicine?  Interactions are not expected.  What if I miss a dose?  If you miss a dose, take it as soon as you can. If it is almost time for your next dose, take only that dose. Do not take double or extra doses.  Where should I keep my medicine?  Keep out of the reach of children.  Store at room temperature between 15 and 30 degrees C (59 and 86 degrees  F). Protect from moisture. Throw away any unused medicine after the expiration date.  What should I tell my health care provider before I take this medicine?  They need to know if you have any of these conditions:    change in bowel habits for more than 14 days    blocked intestines or bowel    stomach pain, nausea, or vomiting    trouble swallowing    an unusual or allergic reaction to psyllium, other medicines, dyes, or preservatives    pregnant or trying or get pregnant    breast-feeding  What should I watch for while using this medicine?  This medicine can take up to 3 days to work. Check with your doctor or health care professional if your symptoms do not start to get better or if they get worse. See your doctor if you have to treat your constipation for more than 1 week.  Avoid taking other medicines within 2 hours of taking t right s medicine.  Drink several glasses of water a day while you are taking this medicine. This will help to relieve constipation and prevent dehydration.  NOTE:This sheet is a summary. It may not cover all possible information. If you have questions about this medicine, talk to your doctor, pharmacist, or health care provider. Copyright  2018 Elsevier

## 2018-08-07 NOTE — PROGRESS NOTES
GASTROENTEROLOGY NEW PATIENT CLINIC VISIT    CC/REFERRING MD:    Barak Cameron    REASON FOR CONSULTATION:   Barak Cameron for   Chief Complaint   Patient presents with     Consult     GI concern       HISTORY OF PRESENT ILLNESS:    Iam Kaur is 73 year old male who presents for evaluation of frequent stools.  He reports a long history of frequent bowel movements mostly occurring in the morning.  He does not have any nocturnal stools but upon awakening he will have 8-9 bowel movements in the morning.  The bowel movements are described as soft to loose.  He also will have bowel movements during the day and a few additional times mostly after eating.  He frequently takes Imodium 2-3 pills per day with improvement in symptoms.  He has been given a Lomotil prescription and takes this for special events such as golfing.  Certain foods such as Chinese food worsen his symptoms.  He has occasional wipe hematochezia.  He is taking fiber supplement powder once per day.  He takes NSAIDs several times per week.  He has a history of rectal cancer from the year 2000 which was treated with surgery followed by chemoradiation.  Last colonoscopy was 2 years ago but random biopsies were not taken.      PREVIOUS ENDOSCOPY:  Procedure Date: 6/27/2016 1:09 PM  Impression:          - One 4 mm polyp in the sigmoid colon. Resected and                        retrieved.                        - Diverticulosis in the sigmoid colon.                        - Patent end-to-end colo-colonic anastomosis.                        - The examination was otherwise normal.      PROBLEM LIST  Patient Active Problem List    Diagnosis Date Noted     Morbid obesity (H) 08/07/2018     Priority: Medium     Alcohol use 11/30/2016     Priority: Medium     Lumbosacral neuritis 09/28/2013     Priority: Medium     L5/S1 with moderate foraminal stenosis on the left        Facet arthritis of lumbar region (H)  09/28/2013     Priority: Medium     Spondylosis of lumbar joint 04/22/2013     Priority: Medium     Trochanteric bursitis 04/22/2013     Priority: Medium     Pain in shoulder 04/22/2013     Priority: Medium     HTN, goal below 140/90 02/27/2013     Priority: Medium     Advanced directives, counseling/discussion 10/31/2012     Priority: Medium     Discussed advance care planning with patient; information given to patient to review. 10/31/2012          Impaired fasting glucose 05/04/2011     Priority: Medium     Essential hypertension 05/04/2011     Priority: Medium     Obesity 05/04/2011     Priority: Medium     Hyperlipidemia LDL goal <130 05/04/2011     Priority: Medium     Sleep apnea      Priority: Medium     severe, recommended CPAP       Tear of medial cartilage or meniscus of knee, current 09/14/2009     Priority: Medium     Irritable bowel syndrome      Priority: Medium     Regular diarrhea       Malignant neoplasm of rectum (H) 08/07/2001     Priority: Medium       PERTINENT PAST MEDICAL HISTORY:  (I personally reviewed this history with the patient at today's visit)   Past Medical History:   Diagnosis Date     Chest pain, unspecified 06/22/1999    Atypical chest pain, felt to be stress related, negative GXT     Closed dislocation of acromioclavicular (joint) 1962 & 1990    Chronic dislocations of shoulder - two surgeries with good results     Irritable bowel syndrome      Malignant neoplasm of colon, unspecified site 2000    Colon cancer/ rectal, Dr. Tee     Pneumonia, organism unspecified(486) 1977    Hospitalized 3 to 4 days     Sleep apnea 11/09    severe, recommended CPAP         PREVIOUS SURGERIES: (I personally reviewed this history with the patient at today's visit)   Past Surgical History:   Procedure Laterality Date     C EXCIS RECTAL LESION,TRANSANAL  05/09/2000    Transanal excision of rectal polyp, tubulovillous adenoma with severe dysplasia     COLONOSCOPY  03/05/07    repeat in 3 yrs      COLONOSCOPY  06/09/10    repeat in 3 yrs     COLONOSCOPY  6/11/13    colonoscopy, polypectomy, recheck in 3 years     ESOPHAGOSCOPY, GASTROSCOPY, DUODENOSCOPY (EGD), COMBINED  11/30/2010    COMBINED ESOPHAGOSCOPY, GASTROSCOPY, DUODENOSCOPY (EGD), BIOPSY SINGLE OR MULTIPLE performed by SHAGUFTA ROLDAN at  GI      COLONOSCOPY THRU STOMA, DIAGNOSTIC  2001      REMOVAL OF TONSILS,<10 Y/O      Unsure of age at time of surgery     HC REPAIR ROTATOR CUFF,ACUTE  1962, 1966    Bilateral shoulder surgery for chronic dislocation     INJECT EPIDURAL LUMBAR  10/14/2014    [facility]     REMOVAL OF SPERM DUCT(S)  1975    Vasectomy         ALLERGIES:     Allergies   Allergen Reactions     Naproxen Itching       PERTINENT MEDICATIONS:    Current Outpatient Prescriptions:      ASPIRIN 81 MG OR TABS, ONE DAILY, Disp: 0, Rfl: 0     atorvastatin (LIPITOR) 10 MG tablet, Take 1 tablet (10 mg) by mouth daily, Disp: 90 tablet, Rfl: 3     diclofenac (VOLTAREN) 75 MG EC tablet, 1 tab po BID prn, Disp: 60 tablet, Rfl: 1     diphenoxylate-atropine (LOMOTIL) 2.5-0.025 MG per tablet, Take 2 tablets by mouth 4 times daily as needed for diarrhea, Disp: 30 tablet, Rfl: 5     lisinopril (PRINIVIL/ZESTRIL) 10 MG tablet, Take 1 tablet (10 mg) by mouth daily, Disp: 90 tablet, Rfl: 0     multivitamin, therapeutic with minerals (MULTI-VITAMIN) TABS, Take 1 tablet by mouth daily, Disp: , Rfl:      psyllium (METAMUCIL) 58.6 % POWD, Take by mouth daily Gummy fiber, Disp: , Rfl:     SOCIAL HISTORY:  Social History     Social History     Marital status:      Spouse name: June     Number of children: 2     Years of education: 16     Occupational History           [employer]     Social History Main Topics     Smoking status: Never Smoker     Smokeless tobacco: Never Used      Comment: No smokers in home     Alcohol use Yes      Comment: 21 drinks a week     Drug use: No      Comment: caffeine 3-4 daily      "Sexual activity: Yes     Partners: Female     Birth control/ protection: Surgical      Comment: vasectomy     Other Topics Concern      Service No     Blood Transfusions No     Caffeine Concern No     6 cups/day     Occupational Exposure No     Hobby Hazards No     golfs, fishes, hunts     Sleep Concern Yes     doesn't sleep under stress well     Stress Concern Yes     work     Weight Concern Yes     Special Diet No     Back Care No     Exercise Yes     3/week     Bike Helmet No     Seat Belt Yes     Self-Exams No     Social History Narrative       FAMILY HISTORY: (I personally reviewed this history with the patient at today's visit)  Family History   Problem Relation Age of Onset     Alzheimer Disease Father      Cerebrovascular Disease Father      x 3-4 episodes     Arthritis Mother      RA     Breast Cancer Sister      HEART DISEASE Sister      HEART DISEASE Sister      Other Cancer Brother      Diabetes Paternal Grandmother      HEART DISEASE Maternal Grandmother      HEART DISEASE Maternal Grandfather      Hypertension Brother           ROS:    No fevers or chills  No weight loss  No blurry vision, double vision or change in vision  No sore throat  No lymphadenopathy  No headache, paraesthesias, or weakness in a limb  No shortness of breath or wheezing  No chest pain or pressure  No arthralgias or myalgias  No rashes or skin changes  No odynophagia or dysphagia  No BRBPR, hematochezia, melena  No dysuria, frequency or urgency  No hot/cold intolerance or polyria  No anxiety or depression  PHYSICAL EXAMINATION:  Constitutional: aaox3, cooperative, pleasant, not dyspneic/diaphoretic, no acute distress  Vitals reviewed: /89 (BP Location: Left arm, Patient Position: Sitting, Cuff Size: Adult Regular)  Pulse 81  Temp 97.7  F (36.5  C) (Oral)  Ht 1.702 m (5' 7\")  Wt 107.6 kg (237 lb 4.8 oz)  SpO2 97%  BMI 37.17 kg/m2  Wt:   Wt Readings from Last 2 Encounters:   08/07/18 107.6 kg (237 lb 4.8 oz) "   06/28/18 107.4 kg (236 lb 12.8 oz)      Eyes: Sclera anicteric/injected  Ears/nose/mouth/throat: Normal oropharynx without ulcers or exudate, mucus membranes moist, hearing intact  Neck: supple, thyroid normal size  CV: No edema  Respiratory: Unlabored breathing  Lymph: No submandibular, supraclavicular or inguinal lymphadenopathy  Abd: obese, nontender, Nondistended, no masses, +bs, no hepatosplenomegaly, nontender, no peritoneal signs  Skin: warm, perfused, no jaundice  Psych: Normal affect  MSK: Normal gait      PERTINENT STUDIES: (I personally reviewed these laboratory studies today)  Most recent CBC:   Recent Labs   Lab Test  08/14/17   1002   WBC  6.0   HGB  13.4   HCT  39.8*   PLT  234     Most recent hepatic panel:  Recent Labs   Lab Test  06/14/17   1146  07/18/14   0758   ALT  38  43   AST  16  26     Most recent creatinine:  Recent Labs   Lab Test  06/14/17   1146  06/27/16   1129   CR  0.82  0.77       ASSESSMENT/PLAN:    Iam Kaur is a 73 year old male who presents for a new problem of frequent bowel movements.  He has symptoms of frequent soft stools which is exacerbated by eating.  Possibilities include microscopic colitis, radiation proctitis, very chronic diarrheal illness.  Will obtain lab studies at the present time along with stool studies.  We will also perform a diagnostic colonoscopy with biopsies in order to evaluate further for microscopic colitis or radiation proctitis.    Additional evaluation is required at the present time.     Loose stools  Morbid obesity (H)  Orders Placed This Encounter   Procedures     Calprotectin Feces     Tissue transglutaminase olivier IgA and IgG     TSH with free T4 reflex     **Comprehensive metabolic panel FUTURE anytime     CBC with platelets     GASTROENTEROLOGY ADULT REF PROCEDURE ONLY [facility] [phone] ([place]); [facility]     He has been instructed to use Metamucil wafers instead of his soluble fiber.  He has been instructed to avoid  NSAIDs.  He may continue to use loperamide and Lomotil as he is currently taking.    RTC 3 months    Thank you for this consultation.  It was a pleasure to participate in the care of this patient; please contact us with any further questions.      This note was created with voice recognition software, and while reviewed for accuracy, typos may remain.     Rosendo Delgado MD  Adjunct  of Medicine  Division of Gastroenterology, Hepatology and Nutrition  [facility]  [phone]

## 2018-08-07 NOTE — TELEPHONE ENCOUNTER
"Requested Prescriptions   Pending Prescriptions Disp Refills     lisinopril (PRINIVIL/ZESTRIL) 10 MG tablet 90 tablet 3     Sig: Take 1 tablet (10 mg) by mouth daily    ACE Inhibitors (Including Combos) Protocol Failed    8/6/2018 10:58 AM       Failed - Normal serum creatinine on file in past 12 months    Recent Labs   Lab Test  06/14/17   1146   CR  0.82          Failed - Normal serum potassium on file in past 12 months    Recent Labs   Lab Test  06/14/17   1146   POTASSIUM  4.4          Passed - Blood pressure under 140/90 in past 12 months    BP Readings from Last 3 Encounters:   06/28/18 120/70   12/01/17 136/78   08/14/17 125/85          Passed - Recent (12 mo) or future (30 days) visit within the authorizing provider's specialty    Patient had office visit in the last 12 months or has a visit in the next 30 days with authorizing provider or within the authorizing provider's specialty.  See \"Patient Info\" tab in inbasket, or \"Choose Columns\" in Meds & Orders section of the refill encounter.           Passed - Patient is age 18 or older        Last ov 06/28/2018  Last filled 06/14/2017  Needs BMP    Please call and help schedule.  Medication is being filled for 1 time refill only due to:  clark Laws, RN, BSN                          "

## 2018-08-08 ENCOUNTER — ALLIED HEALTH/NURSE VISIT (OUTPATIENT)
Dept: FAMILY MEDICINE | Facility: OTHER | Age: 73
End: 2018-08-08
Payer: COMMERCIAL

## 2018-08-08 DIAGNOSIS — R19.5 LOOSE STOOLS: ICD-10-CM

## 2018-08-08 DIAGNOSIS — Z23 NEED FOR DIPHTHERIA-TETANUS-PERTUSSIS (TDAP) VACCINE: Primary | ICD-10-CM

## 2018-08-08 LAB
ALBUMIN SERPL-MCNC: 3.9 G/DL (ref 3.4–5)
ALP SERPL-CCNC: 83 U/L (ref 40–150)
ALT SERPL W P-5'-P-CCNC: 47 U/L (ref 0–70)
ANION GAP SERPL CALCULATED.3IONS-SCNC: 10 MMOL/L (ref 3–14)
AST SERPL W P-5'-P-CCNC: 25 U/L (ref 0–45)
BILIRUB SERPL-MCNC: 0.5 MG/DL (ref 0.2–1.3)
BUN SERPL-MCNC: 24 MG/DL (ref 7–30)
C DIFF TOX B STL QL: NEGATIVE
CALCIUM SERPL-MCNC: 9 MG/DL (ref 8.5–10.1)
CHLORIDE SERPL-SCNC: 103 MMOL/L (ref 94–109)
CO2 SERPL-SCNC: 24 MMOL/L (ref 20–32)
CREAT SERPL-MCNC: 0.96 MG/DL (ref 0.66–1.25)
ERYTHROCYTE [DISTWIDTH] IN BLOOD BY AUTOMATED COUNT: 11.6 % (ref 10–15)
GFR SERPL CREATININE-BSD FRML MDRD: 76 ML/MIN/1.7M2
GLUCOSE SERPL-MCNC: 125 MG/DL (ref 70–99)
HCT VFR BLD AUTO: 42.2 % (ref 40–53)
HGB BLD-MCNC: 14.2 G/DL (ref 13.3–17.7)
MCH RBC QN AUTO: 32.3 PG (ref 26.5–33)
MCHC RBC AUTO-ENTMCNC: 33.6 G/DL (ref 31.5–36.5)
MCV RBC AUTO: 96 FL (ref 78–100)
PLATELET # BLD AUTO: 263 10E9/L (ref 150–450)
POTASSIUM SERPL-SCNC: 4.6 MMOL/L (ref 3.4–5.3)
PROT SERPL-MCNC: 7.5 G/DL (ref 6.8–8.8)
RBC # BLD AUTO: 4.39 10E12/L (ref 4.4–5.9)
SODIUM SERPL-SCNC: 137 MMOL/L (ref 133–144)
SPECIMEN SOURCE: NORMAL
TSH SERPL DL<=0.005 MIU/L-ACNC: 2.58 MU/L (ref 0.4–4)
WBC # BLD AUTO: 7.3 10E9/L (ref 4–11)

## 2018-08-08 PROCEDURE — 87493 C DIFF AMPLIFIED PROBE: CPT | Performed by: INTERNAL MEDICINE

## 2018-08-08 PROCEDURE — 80053 COMPREHEN METABOLIC PANEL: CPT | Performed by: INTERNAL MEDICINE

## 2018-08-08 PROCEDURE — 90715 TDAP VACCINE 7 YRS/> IM: CPT

## 2018-08-08 PROCEDURE — 99207 ZZC NO CHARGE NURSE ONLY: CPT

## 2018-08-08 PROCEDURE — 84443 ASSAY THYROID STIM HORMONE: CPT | Performed by: INTERNAL MEDICINE

## 2018-08-08 PROCEDURE — 90471 IMMUNIZATION ADMIN: CPT

## 2018-08-08 PROCEDURE — 85027 COMPLETE CBC AUTOMATED: CPT | Performed by: INTERNAL MEDICINE

## 2018-08-08 PROCEDURE — 83993 ASSAY FOR CALPROTECTIN FECAL: CPT | Performed by: INTERNAL MEDICINE

## 2018-08-08 PROCEDURE — 83516 IMMUNOASSAY NONANTIBODY: CPT | Performed by: INTERNAL MEDICINE

## 2018-08-08 PROCEDURE — 36415 COLL VENOUS BLD VENIPUNCTURE: CPT | Performed by: INTERNAL MEDICINE

## 2018-08-08 PROCEDURE — 83516 IMMUNOASSAY NONANTIBODY: CPT | Mod: 59 | Performed by: INTERNAL MEDICINE

## 2018-08-08 NOTE — MR AVS SNAPSHOT
After Visit Summary   8/8/2018    Iam Kaur    MRN: [id number]           Patient Information     Date Of Birth          1945        Visit Information        Provider Department      8/8/2018 1:30 PM [facility] B, [facility]        Today's Diagnoses     Need for diphtheria-tetanus-pertussis (Tdap) vaccine    -  1       Follow-ups after your visit        Who to contact     If you have questions or need follow up information about today's clinic visit or your schedule please contact [facility] directly at [phone].  Normal or non-critical lab and imaging results will be communicated to you by [facility], letter or phone within 4 business days after the clinic has received the results. If you do not hear from us within 7 days, please contact the clinic through [facility] or phone. If you have a critical or abnormal lab result, we will notify you by phone as soon as possible.  Submit refill requests through [facility] or call your pharmacy and they will forward the refill request to us. Please allow 3 business days for your refill to be completed.          Additional Information About Your Visit        MyChart Information     [facility] gives you secure access to your electronic health record. If you see a primary care provider, you can also send messages to your care team and make appointments. If you have questions, please call your primary care clinic.  If you do not have a primary care provider, please call [phone] and they will assist you.        Care EveryWhere ID     This is your Care EveryWhere ID. This could be used by other organizations to access your [facility] medical records  [id number]         Blood Pressure from Last 3 Encounters:   08/07/18 135/89   06/28/18 120/70   12/01/17 136/78    Weight from Last 3 Encounters:   08/07/18 237 lb 4.8 oz (107.6 kg)   06/28/18 236 lb 12.8 oz (107.4 kg)   12/01/17 238 lb (108 kg)              We Performed  the Following     TDAP, IM (10 - 64 YRS) - Adacel        Primary Care Provider Office Phone # Fax #    Barak Cameron PA-C [phone]       [address] [place] [id number]        Equal Access to Services     MEHRAN COPE : Hadii aad ku hadleilao Soomaali, waaxda luqadaha, qaybta kaalmada adeegyada, waxay elisein hayaan adeivon arambuladyandaniel doss . So [facility] [phone].    ATENCIÓN: Si habla español, tiene a em disposición servicios gratuitos de asistencia lingüística. Llame al [phone].    We comply with applicable federal civil rights laws and [place] laws. We do not discriminate on the basis of race, color, national origin, age, disability, sex, sexual orientation, or gender identity.            Thank you!     Thank you for choosing [facility]  for your care. Our goal is always to provide you with excellent care. Hearing back from our patients is one way we can continue to improve our services. Please take a few minutes to complete the written survey that you may receive in the mail after your visit with us. Thank you!             Your Updated Medication List - Protect others around you: Learn how to safely use, store and throw away your medicines at www.disposemymeds.org.          This list is accurate as of 8/8/18  1:37 PM.  Always use your most recent med list.                   Brand Name Dispense Instructions for use Diagnosis    aspirin 81 MG tablet     0    ONE DAILY    Routine general medical examination at a health care facility       atorvastatin 10 MG tablet    LIPITOR    90 tablet    Take 1 tablet (10 mg) by mouth daily    Hyperlipidemia LDL goal <130       diclofenac 75 MG EC tablet    VOLTAREN    60 tablet    1 tab po BID prn    Lumbosacral neuritis       diphenoxylate-atropine 2.5-0.025 MG per tablet    LOMOTIL    30 tablet    Take 2 tablets by mouth 4 times daily as needed for diarrhea    Chronic diarrhea       lisinopril 10 MG tablet    PRINIVIL/ZESTRIL     90 tablet    Take 1 tablet (10 mg) by mouth daily    Essential hypertension with goal blood pressure less than 140/90       Multi-vitamin Tabs tablet      Take 1 tablet by mouth daily        psyllium 58.6 % Powd    METAMUCIL     Take by mouth daily Gummy fiber

## 2018-08-08 NOTE — NURSING NOTE
Screening Questionnaire for Adult Immunization    Are you sick today?   No   Do you have allergies to medications, food, a vaccine component or latex?   No   Have you ever had a serious reaction after receiving a vaccination?   No   Do you have a long-term health problem with heart disease, lung disease, asthma, kidney disease, metabolic disease (e.g. diabetes), anemia, or other blood disorder?   No   Do you have cancer, leukemia, HIV/AIDS, or any other immune system problem?   No   In the past 3 months, have you taken medications that affect  your immune system, such as prednisone, other steroids, or anticancer drugs; drugs for the treatment of rheumatoid arthritis, Crohn s disease, or psoriasis; or have you had radiation treatments?   No   Have you had a seizure, or a brain or other nervous system problem?   No   During the past year, have you received a transfusion of blood or blood     products, or been given immune (gamma) globulin or antiviral drug?   No   For women: Are you pregnant or is there a chance you could become        pregnant during the next month?   No   Have you received any vaccinations in the past 4 weeks?   No     Immunization questionnaire answers were all negative.        Per orders of Pepito Cameron PA-C, injection of Tdap given by Veronica Prieto. Patient instructed to remain in clinic for 15 minutes afterwards, and to report any adverse reaction to me immediately.       Screening performed by Veronica Prieto on 8/8/2018 at 1:36 PM.

## 2018-08-09 LAB
TTG IGA SER-ACNC: <1 U/ML
TTG IGG SER-ACNC: <1 U/ML

## 2018-08-10 LAB — CALPROTECTIN STL-MCNT: <27 MG/KG (ref 0–49.9)

## 2018-09-14 ENCOUNTER — SURGERY (OUTPATIENT)
Age: 73
End: 2018-09-14
Payer: COMMERCIAL

## 2018-09-14 ENCOUNTER — HOSPITAL ENCOUNTER (OUTPATIENT)
Facility: AMBULATORY SURGERY CENTER | Age: 73
Discharge: HOME | End: 2018-09-14
Attending: INTERNAL MEDICINE | Admitting: INTERNAL MEDICINE
Payer: COMMERCIAL

## 2018-09-14 VITALS
RESPIRATION RATE: 18 BRPM | TEMPERATURE: 98.3 F | OXYGEN SATURATION: 94 % | DIASTOLIC BLOOD PRESSURE: 91 MMHG | SYSTOLIC BLOOD PRESSURE: 146 MMHG

## 2018-09-14 LAB — COLONOSCOPY: NORMAL

## 2018-09-14 PROCEDURE — 45380 COLONOSCOPY AND BIOPSY: CPT | Performed by: INTERNAL MEDICINE

## 2018-09-14 PROCEDURE — G8918 PT W/O PREOP ORDER IV AB PRO: HCPCS

## 2018-09-14 PROCEDURE — 88305 TISSUE EXAM BY PATHOLOGIST: CPT | Performed by: INTERNAL MEDICINE

## 2018-09-14 PROCEDURE — 45380 COLONOSCOPY AND BIOPSY: CPT

## 2018-09-14 PROCEDURE — G8907 PT DOC NO EVENTS ON DISCHARG: HCPCS

## 2018-09-14 RX ORDER — FENTANYL CITRATE 50 UG/ML
INJECTION, SOLUTION INTRAMUSCULAR; INTRAVENOUS PRN
Status: DISCONTINUED | OUTPATIENT
Start: 2018-09-14 | End: 2018-09-14 | Stop reason: HOSPADM

## 2018-09-14 RX ORDER — ONDANSETRON 2 MG/ML
4 INJECTION INTRAMUSCULAR; INTRAVENOUS
Status: DISCONTINUED | OUTPATIENT
Start: 2018-09-14 | End: 2018-09-15 | Stop reason: HOSPADM

## 2018-09-14 RX ORDER — CHLORAL HYDRATE 500 MG
2 CAPSULE ORAL DAILY
COMMUNITY
End: 2021-10-20

## 2018-09-14 RX ORDER — LIDOCAINE 40 MG/G
CREAM TOPICAL
Status: DISCONTINUED | OUTPATIENT
Start: 2018-09-14 | End: 2018-09-15 | Stop reason: HOSPADM

## 2018-09-14 RX ADMIN — FENTANYL CITRATE 25 MCG: 50 INJECTION, SOLUTION INTRAMUSCULAR; INTRAVENOUS at 14:02

## 2018-09-14 RX ADMIN — FENTANYL CITRATE 50 MCG: 50 INJECTION, SOLUTION INTRAMUSCULAR; INTRAVENOUS at 13:59

## 2018-09-18 LAB — COPATH REPORT: NORMAL

## 2018-10-11 ENCOUNTER — TELEPHONE (OUTPATIENT)
Dept: GASTROENTEROLOGY | Facility: CLINIC | Age: 73
End: 2018-10-11

## 2018-10-11 NOTE — TELEPHONE ENCOUNTER
[facility] Call Center    Phone Message    May a detailed message be left on voicemail: yes    Reason for Call: Patient would like to know when he needs to see Dr Delgado for his next follow up appointment.     Action Taken: Message routed to:  Adult Clinics: Gastroenterology (GI) p [id number]'

## 2018-10-11 NOTE — TELEPHONE ENCOUNTER
Pt will reschedule after he gets back from [place].    Thu Tierney RN, BSN, PHN  [facility]  GI/Gen Surg/Hepatology Care Coordinator

## 2018-11-19 DIAGNOSIS — I10 ESSENTIAL HYPERTENSION WITH GOAL BLOOD PRESSURE LESS THAN 140/90: ICD-10-CM

## 2018-11-19 RX ORDER — LISINOPRIL 10 MG/1
10 TABLET ORAL DAILY
Qty: 90 TABLET | Refills: 1 | Status: SHIPPED | OUTPATIENT
Start: 2018-11-19 | End: 2019-05-08

## 2018-11-19 NOTE — TELEPHONE ENCOUNTER
Lisinopril:  Prescription approved per [facility] Refill Protocol.    Mulu Mcdaniels, RN, BSN

## 2018-11-19 NOTE — TELEPHONE ENCOUNTER
Reason for Call:  Medication or medication refill:    Do you use a [facility]?  Name of the pharmacy and phone number for the current request:  [facility] [place]    Name of the medication requested:  Lisinopril 10 mg    Other request: patient will be leaving tomorrow for [place].  He did not know he did not have refills on this medication.  He needs this refilled asap.  Please let him know when done.  Thank you    Can we leave a detailed message on this number? YES    Phone number patient can be reached at: Cell number on file:    Telephone Information:   Mobile [phone]       Best Time: any      Call taken on 11/19/2018 at 8:30 AM by Dai Kathleen

## 2018-12-18 DIAGNOSIS — E78.5 HYPERLIPIDEMIA LDL GOAL <130: ICD-10-CM

## 2018-12-19 RX ORDER — ATORVASTATIN CALCIUM 10 MG/1
10 TABLET, FILM COATED ORAL DAILY
Qty: 90 TABLET | Refills: 1 | Status: SHIPPED | OUTPATIENT
Start: 2018-12-19 | End: 2019-05-08

## 2018-12-19 NOTE — TELEPHONE ENCOUNTER
"Requested Prescriptions   Pending Prescriptions Disp Refills     atorvastatin (LIPITOR) 10 MG tablet 90 tablet 3     Sig: Take 1 tablet (10 mg) by mouth daily    Statins Protocol Passed - 12/18/2018  8:43 AM       Passed - LDL on file in past 12 months    Recent Labs   Lab Test 06/29/18  0915   LDL 98          Passed - No abnormal creatine kinase in past 12 months    No lab results found.        Passed - Recent (12 mo) or future (30 days) visit within the authorizing provider's specialty    Patient had office visit in the last 12 months or has a visit in the next 30 days with authorizing provider or within the authorizing provider's specialty.  See \"Patient Info\" tab in inbasket, or \"Choose Columns\" in Meds & Orders section of the refill encounter.         Passed - Patient is age 18 or older        Last ov 06/28/2018   Last filled 12/01/2017    Prescription approved per [facility] Refill Protocol.  Govind Laws, RN, BSN        "

## 2018-12-24 ENCOUNTER — TELEPHONE (OUTPATIENT)
Dept: FAMILY MEDICINE | Facility: OTHER | Age: 73
End: 2018-12-24

## 2018-12-24 NOTE — TELEPHONE ENCOUNTER
Called and verified with patient on where prescription was sent to on 12/19/18.  Patient stated that was fine and did not need it going to another pharmacy.  Norma Wick CMA ([facility])

## 2019-04-29 NOTE — PATIENT INSTRUCTIONS
Preventive Health Recommendations:     See your health care provider every year to    Review health changes.     Discuss preventive care.      Review your medicines if your doctor has prescribed any.      Talk with your health care provider about whether you should have a test to screen for prostate cancer (PSA).    Every 3 years, have a diabetes test (fasting glucose). If you are at risk for diabetes, you should have this test more often.    Every 5 years, have a cholesterol test. Have this test more often if you are at risk for high cholesterol or heart disease.     Every 10 years, have a colonoscopy. Or, have a yearly FIT test (stool test). These exams will check for colon cancer.    Talk to with your health care provider about screening for Abdominal Aortic Aneurysm if you have a family history of AAA or have a history of smoking.    Shots:     Get a flu shot each year.     Get a tetanus shot every 10 years.     Talk to your doctor about your pneumonia vaccines. There are now two you should receive - Pneumovax (PPSV 23) and Prevnar (PCV 13).     Talk to your pharmacist about a shingles vaccine.     Talk to your doctor about the hepatitis B vaccine.  Nutrition:     Eat at least 5 servings of fruits and vegetables each day.     Eat whole-grain bread, whole-wheat pasta and brown rice instead of white grains and rice.     Get adequate Calcium and Vitamin D.   Lifestyle    Exercise for at least 150 minutes a week (30 minutes a day, 5 days a week). This will help you control your weight and prevent disease.     Limit alcohol to one drink per day.     No smoking.     Wear sunscreen to prevent skin cancer.    See your dentist every six months for an exam and cleaning.    See your eye doctor every 1 to 2 years to screen for conditions such as glaucoma, macular degeneration, cataracts, etc.    Personalized Prevention Plan  You are due for the preventive services outlined below.  Your care team is available to assist you  in scheduling these services.  If you have already completed any of these items, please share that information with your care team to update in your medical record.  Health Maintenance Due   Topic Date Due     Zoster (Shingles) Vaccine (2 of 3) 12/27/2012     Discuss Advance Directive Planning  10/31/2017     Annual Wellness Visit  12/01/2018     FALL RISK ASSESSMENT  12/01/2018     PHQ-2  01/01/2019       Continue with current medications.  Will get updated labs.    I have placed a referral for a nutritionist to discuss a healthier diet.  They will call to schedule.  Continue with routine exercise.    Continue with a high fiber diet and as needed Lomotil.    Follow up annually.

## 2019-04-29 NOTE — PROGRESS NOTES
"SUBJECTIVE:   Iam Kaur is a 74 year old male who presents for Preventive Visit.  Are you in the first 12 months of your Medicare coverage?  No    Healthy Habits:     In general, how would you rate your overall health?  Good    Frequency of exercise:  6-7 days/week    Duration of exercise:  15-30 minutes    Do you usually eat at least 4 servings of fruit and vegetables a day, include whole grains    & fiber and avoid regularly eating high fat or \"junk\" foods?  Yes    Taking medications regularly:  Yes    Medication side effects:  None    Ability to successfully perform activities of daily living:  No assistance needed    Home Safety:  No safety concerns identified    Hearing Impairment:  No hearing concerns    In the past 6 months, have you been bothered by leaking of urine?  No    In general, how would you rate your overall mental or emotional health?  Good      PHQ-2 Total Score: 0    Additional concerns today:  No    He is having difficulty losing weight. He is trying to cut back on his portions sizes but still often eats too late at night. He continues to have a few mixed drinks daily but has been using less alcohol.    He continues to have frequent bowel movements, up to 10 daily but has no diarrhea. He was seen by GI last year and had a colonoscopy which revealed evidence of radiation proctitis but biopsies were normal. He states he takes a daily Lomotil along with a fiber supplement which helps. He will take extra Lomotil if he is golfing or doing something more active. He states he walks 2-4 miles daily.    He has chronic shoulder pain due to arthritis and also has arthritic pain in his hands. He states he goes down to [place] yearly to buy tramadol which he takes sparingly as needed for severe arthritic pain.     He had the dark spot on his nose removed in [place] and states everything was normal.    Do you feel safe in your environment? Yes    Do you have a Health Care Directive? Yes    Fall " risk  Fallen 2 or more times in the past year?: No  Any fall with injury in the past year?: No    Cognitive Screening   1) Repeat 3 items (Leader, Season, Table)    2) Clock draw: NORMAL  3) 3 item recall: Recalls 2 objects   Results: NORMAL clock, 1-2 items recalled: COGNITIVE IMPAIRMENT LESS LIKELY    Mini-CogTM Copyright BALBIR Carpenter. Licensed by the author for use in [facility]; reprinted with permission (bry@[facility]). All rights reserved.      Do you have sleep apnea, excessive snoring or daytime drowsiness?: no    Reviewed and updated as needed this visit by clinical staff  Tobacco  Allergies  Meds  Problems  Med Hx  Surg Hx  Fam Hx  Soc Hx          Reviewed and updated as needed this visit by Provider  Tobacco  Allergies  Meds  Problems  Med Hx  Surg Hx  Fam Hx        Social History     Tobacco Use     Smoking status: Never Smoker     Smokeless tobacco: Never Used     Tobacco comment: No smokers in home   Substance Use Topics     Alcohol use: Yes     Comment: 21 drinks a week     If you drink alcohol do you typically have >3 drinks per day or >7 drinks per week? Yes      Alcohol Use 5/8/2019   Prescreen: >3 drinks/day or >7 drinks/week? Yes   Prescreen: >3 drinks/day or >7 drinks/week? -   AUDIT SCORE  6     AUDIT - Alcohol Use Disorders Identification Test - Reproduced from the World Health Organization Audit 2001 (Second Edition) 5/8/2019   1.  How often do you have a drink containing alcohol? 4 or more times a week   2.  How many drinks containing alcohol do you have on a typical day when you are drinking? 3 or 4   3.  How often do you have five or more drinks on one occasion? Less than monthly   4.  How often during the last year have you found that you were not able to stop drinking once you had started? Never   5.  How often during the last year have you failed to do what was normally expected of you because of drinking? Never   6.  How often during the last year have you needed  a first drink in the morning to get yourself going after a heavy drinking session? Never   7.  How often during the last year have you had a feeling of guilt or remorse after drinking? Never   8.  How often during the last year have you been unable to remember what happened the night before because of your drinking? Never   9.  Have you or someone else been injured because of your drinking? No   10. Has a relative, friend, doctor or other health care worker been concerned about your drinking or suggested you cut down? No   TOTAL SCORE 6       Current providers sharing in care for this patient include:   Patient Care Team:  Barak Cameron PA-C as PCP - General (Physician Assistant)  Barak Cameron PA-C as Assigned PCP    The following health maintenance items are reviewed in Epic and correct as of today:  Health Maintenance   Topic Date Due     ZOSTER IMMUNIZATION (2 of 3) 12/27/2012     ADVANCE DIRECTIVE PLANNING Q5 YRS  10/31/2017     MEDICARE ANNUAL WELLNESS VISIT  12/01/2018     FALL RISK ASSESSMENT  12/01/2018     PHQ-2  01/01/2019     LIPID MONITORING Q1 YEAR  06/29/2019     BMP Q1 YR  08/08/2019     COLONOSCOPY Q3 YR  09/14/2021     DTAP/TDAP/TD IMMUNIZATION (3 - Td) 08/08/2028     INFLUENZA VACCINE  Completed     AORTIC ANEURYSM SCREENING (SYSTEM ASSIGNED)  Completed     IPV IMMUNIZATION  Aged Out     MENINGITIS IMMUNIZATION  Aged Out       Review of Systems  GENERAL: Denies fever, fatigue, weakness, weight gain, or weight loss.  HEENT: Eyes-Denies pain, redness, loss of vision, double or blurred vision.     Ears/Nose-Denies tinnitus, loss of hearing, epistaxis, decreased sense of smell, nasal congestion. Denies loss of sense of taste, dry mouth, or sore throat.   CARDIOVASCULAR: Denies chest pain, shortness of breath, irregular heartbeats, palpitations, or edema.  RESPIRATORY: Denies cough, hemoptysis, and shortness of breath.  GASTROINTESTINAL: Denies nausea, vomiting, change in appetite,  "abdominal pain, diarrhea, or constipation.  GENITOURINARY: Denies increased frequency, urgency, dysuria, hematuria, or incontinence.   MUSCULOSKELETAL: +Multiple joint pain (arthritis), mostly in shoulders.   SKIN: Denies easy bruising, redness, rash, or dry skin.   NEUROLOGIC: Denies headache, fainting, dizziness, memory loss, numbness, tingling, or seizures.  PSYCHIATRIC: Denies depression, anxiety, mood swings, and thoughts of suicide.  ENDOCRINE: Denies heat and cold intolerance, polydipsia, or polyuria.   HEMATOLOGIC/LYMPHATIC: Denies easy bleeding or lymphadenopathy.   ALLERGIC/IMMUNOLOGIC: Denies rhinitis, asthma, skin sensitivity.     OBJECTIVE:   /82   Pulse 91   Temp 98.1  F (36.7  C) (Temporal)   Resp 18   Ht 1.702 m (5' 7\")   Wt 106.6 kg (235 lb)   SpO2 97%   BMI 36.81 kg/m   Estimated body mass index is 36.81 kg/m  as calculated from the following:    Height as of this encounter: 1.702 m (5' 7\").    Weight as of this encounter: 106.6 kg (235 lb).  Physical Exam  GENERAL: healthy, alert and no distress  EYES: Eyes grossly normal to inspection, PERRL and conjunctivae and sclerae normal  HENT: ear canals and TM's normal, nose and mouth without ulcers or lesions  NECK: no adenopathy, no asymmetry, masses, or scars and thyroid normal to palpation  RESP: lungs clear to auscultation - no rales, rhonchi or wheezes  CV: regular rate and rhythm, normal S1 S2, no S3 or S4, no murmur, click or rub, no peripheral edema and peripheral pulses strong  ABDOMEN: soft, nontender, no hepatosplenomegaly, no masses and bowel sounds normal   (male): normal male circumcised genitalia without lesions or urethral discharge, no hernia  MS: no gross musculoskeletal defects noted, no edema. Decreased shoulder abduction. No spinal tenderness.   SKIN: no suspicious lesions or rashes  NEURO: Normal strength and tone, mentation intact and speech normal. Cranial nerves II-XII are grossly intact. DTRs are 2+/4 throughout " and symmetric. Gait is stable.  PSYCH: mentation appears normal, affect normal/bright    ASSESSMENT / PLAN:       ICD-10-CM    1. Encounter for routine adult health examination without abnormal findings Z00.00    2. Radiation proctitis K62.7    3. Frequent bowel movements R19.4    4. Essential hypertension with goal blood pressure less than 140/90 I10 Comprehensive metabolic panel (BMP + Alb, Alk Phos, ALT, AST, Total. Bili, TP)     lisinopril (PRINIVIL/ZESTRIL) 10 MG tablet   5. Class 2 obesity without serious comorbidity with body mass index (BMI) of 37.0 to 37.9 in adult, unspecified obesity type E66.9 NUTRITION REFERRAL    Z68.37    6. Hyperlipidemia LDL goal <130 E78.5 Lipid panel reflex to direct LDL Fasting     atorvastatin (LIPITOR) 10 MG tablet   7. Impaired fasting glucose R73.01 Comprehensive metabolic panel (BMP + Alb, Alk Phos, ALT, AST, Total. Bili, TP)     Hemoglobin A1c   8. Alcohol use Z78.9    9. Chronic diarrhea K52.9 diphenoxylate-atropine (LOMOTIL) 2.5-0.025 MG tablet   10. Screening for prostate cancer Z12.5 PSA, screen   11. Primary osteoarthritis of both shoulders M19.011     M19.012        2-3, 9 History of frequent bowel movements daily for the past 20 or so years. Symptoms consistent with radiation proctitis given his radiation after his rectal cancer in 2000 and given colonoscopy findings last year. I recommend he continue with a high fiber diet along with as needed Lomotil. He should also avoid frequent alcohol use and eating at night as both of these exacerbate his symptoms. He does not wish to have any further follow up with GI and I also think this is unnecessary.     4. Stable, continue lisinopril. Fasting CMP ordered.    5. He has been having difficulty losing weight despite routine exercise and a healthier diet. I recommend he continue to work on cutting back on the night time eating and continue with daily exercise. He is interested in seeing a nutritionist so referral has been  "placed and they will call to schedule.    6. Updated fasting lipid panel ordered. Continue Lipitor.    7. Updated A1c ordered.    8. I recommend he limit his alcohol use and he states he has cut back.    10. Updated PSA ordered.    11. I recommend Tylenol or ibuprofen as needed. He also gets tramadol from [place] which he only uses for severe pain.    Follow up annually.     End of Life Planning:  Patient currently has an advanced directive: Yes.  Practitioner is supportive of decision.    COUNSELING:  Reviewed preventive health counseling, as reflected in patient instructions       Regular exercise       Healthy diet/nutrition    BP Readings from Last 1 Encounters:   05/08/19 126/82     Estimated body mass index is 36.81 kg/m  as calculated from the following:    Height as of this encounter: 1.702 m (5' 7\").    Weight as of this encounter: 106.6 kg (235 lb).    BP Screening:   Last 3 BP Readings:    BP Readings from Last 3 Encounters:   05/08/19 126/82   09/14/18 (!) 146/91   08/07/18 135/89       The following was recommended to the patient:  Weight management plan: Discussed healthy diet and exercise guidelines     reports that he has never smoked. He has never used smokeless tobacco.      Appropriate preventive services were discussed with this patient, including applicable screening as appropriate for cardiovascular disease, diabetes, osteopenia/osteoporosis, and glaucoma.  As appropriate for age/gender, discussed screening for colorectal cancer, prostate cancer, breast cancer, and cervical cancer. Checklist reviewing preventive services available has been given to the patient.    Reviewed patients plan of care and provided an AVS. The Basic Care Plan (routine screening as documented in Health Maintenance) for Iam meets the Care Plan requirement. This Care Plan has been established and reviewed with the Patient.    Counseling Resources:  ATP IV Guidelines  Pooled Cohorts Equation Calculator  Breast Cancer " Risk Calculator  FRAX Risk Assessment  ICSI Preventive Guidelines  Dietary Guidelines for Americans, 2010  USDA's MyPlate  ASA Prophylaxis  Lung CA Screening    Barak Cameron PA-C  [facility]    Identified Health Risks:

## 2019-05-08 ENCOUNTER — OFFICE VISIT (OUTPATIENT)
Dept: FAMILY MEDICINE | Facility: OTHER | Age: 74
End: 2019-05-08
Payer: COMMERCIAL

## 2019-05-08 VITALS
HEART RATE: 91 BPM | HEIGHT: 67 IN | RESPIRATION RATE: 18 BRPM | TEMPERATURE: 98.1 F | SYSTOLIC BLOOD PRESSURE: 126 MMHG | DIASTOLIC BLOOD PRESSURE: 82 MMHG | BODY MASS INDEX: 36.88 KG/M2 | WEIGHT: 235 LBS | OXYGEN SATURATION: 97 %

## 2019-05-08 DIAGNOSIS — Z12.5 SCREENING FOR PROSTATE CANCER: ICD-10-CM

## 2019-05-08 DIAGNOSIS — Z78.9 ALCOHOL USE: ICD-10-CM

## 2019-05-08 DIAGNOSIS — R73.01 IMPAIRED FASTING GLUCOSE: ICD-10-CM

## 2019-05-08 DIAGNOSIS — I10 ESSENTIAL HYPERTENSION WITH GOAL BLOOD PRESSURE LESS THAN 140/90: ICD-10-CM

## 2019-05-08 DIAGNOSIS — E66.812 CLASS 2 OBESITY WITHOUT SERIOUS COMORBIDITY WITH BODY MASS INDEX (BMI) OF 37.0 TO 37.9 IN ADULT, UNSPECIFIED OBESITY TYPE: ICD-10-CM

## 2019-05-08 DIAGNOSIS — K62.7 RADIATION PROCTITIS: ICD-10-CM

## 2019-05-08 DIAGNOSIS — Z00.00 ENCOUNTER FOR ROUTINE ADULT HEALTH EXAMINATION WITHOUT ABNORMAL FINDINGS: Primary | ICD-10-CM

## 2019-05-08 DIAGNOSIS — R19.4 FREQUENT BOWEL MOVEMENTS: ICD-10-CM

## 2019-05-08 DIAGNOSIS — E78.5 HYPERLIPIDEMIA LDL GOAL <130: ICD-10-CM

## 2019-05-08 DIAGNOSIS — K52.9 CHRONIC DIARRHEA: ICD-10-CM

## 2019-05-08 DIAGNOSIS — M19.011 PRIMARY OSTEOARTHRITIS OF BOTH SHOULDERS: ICD-10-CM

## 2019-05-08 DIAGNOSIS — M19.012 PRIMARY OSTEOARTHRITIS OF BOTH SHOULDERS: ICD-10-CM

## 2019-05-08 LAB
ALBUMIN SERPL-MCNC: 3.8 G/DL (ref 3.4–5)
ALP SERPL-CCNC: 88 U/L (ref 40–150)
ALT SERPL W P-5'-P-CCNC: 44 U/L (ref 0–70)
ANION GAP SERPL CALCULATED.3IONS-SCNC: 8 MMOL/L (ref 3–14)
AST SERPL W P-5'-P-CCNC: 23 U/L (ref 0–45)
BILIRUB SERPL-MCNC: 0.6 MG/DL (ref 0.2–1.3)
BUN SERPL-MCNC: 22 MG/DL (ref 7–30)
CALCIUM SERPL-MCNC: 9.3 MG/DL (ref 8.5–10.1)
CHLORIDE SERPL-SCNC: 104 MMOL/L (ref 94–109)
CHOLEST SERPL-MCNC: 153 MG/DL
CO2 SERPL-SCNC: 26 MMOL/L (ref 20–32)
CREAT SERPL-MCNC: 0.88 MG/DL (ref 0.66–1.25)
GFR SERPL CREATININE-BSD FRML MDRD: 84 ML/MIN/{1.73_M2}
GLUCOSE SERPL-MCNC: 104 MG/DL (ref 70–99)
HBA1C MFR BLD: 7.1 % (ref 0–5.6)
HDLC SERPL-MCNC: 45 MG/DL
LDLC SERPL CALC-MCNC: 89 MG/DL
NONHDLC SERPL-MCNC: 108 MG/DL
POTASSIUM SERPL-SCNC: 4.4 MMOL/L (ref 3.4–5.3)
PROT SERPL-MCNC: 7.5 G/DL (ref 6.8–8.8)
PSA SERPL-ACNC: 0.24 UG/L (ref 0–4)
SODIUM SERPL-SCNC: 138 MMOL/L (ref 133–144)
TRIGL SERPL-MCNC: 97 MG/DL

## 2019-05-08 PROCEDURE — G0103 PSA SCREENING: HCPCS | Performed by: PHYSICIAN ASSISTANT

## 2019-05-08 PROCEDURE — 99397 PER PM REEVAL EST PAT 65+ YR: CPT | Performed by: PHYSICIAN ASSISTANT

## 2019-05-08 PROCEDURE — 36415 COLL VENOUS BLD VENIPUNCTURE: CPT | Performed by: PHYSICIAN ASSISTANT

## 2019-05-08 PROCEDURE — 80053 COMPREHEN METABOLIC PANEL: CPT | Performed by: PHYSICIAN ASSISTANT

## 2019-05-08 PROCEDURE — 83036 HEMOGLOBIN GLYCOSYLATED A1C: CPT | Performed by: PHYSICIAN ASSISTANT

## 2019-05-08 PROCEDURE — 80061 LIPID PANEL: CPT | Performed by: PHYSICIAN ASSISTANT

## 2019-05-08 PROCEDURE — 99213 OFFICE O/P EST LOW 20 MIN: CPT | Mod: 25 | Performed by: PHYSICIAN ASSISTANT

## 2019-05-08 RX ORDER — LISINOPRIL 10 MG/1
10 TABLET ORAL DAILY
Qty: 90 TABLET | Refills: 3 | Status: SHIPPED | OUTPATIENT
Start: 2019-05-08 | End: 2020-05-07

## 2019-05-08 RX ORDER — TRAMADOL HYDROCHLORIDE 50 MG/1
100 TABLET ORAL EVERY 6 HOURS PRN
Refills: 0 | COMMUNITY
Start: 2019-05-08 | End: 2022-07-13

## 2019-05-08 RX ORDER — ATORVASTATIN CALCIUM 10 MG/1
10 TABLET, FILM COATED ORAL DAILY
Qty: 90 TABLET | Refills: 3 | Status: SHIPPED | OUTPATIENT
Start: 2019-05-08 | End: 2020-05-07

## 2019-05-08 RX ORDER — DIPHENOXYLATE HCL/ATROPINE 2.5-.025MG
2 TABLET ORAL 4 TIMES DAILY PRN
Qty: 30 TABLET | Refills: 11 | Status: SHIPPED | OUTPATIENT
Start: 2019-05-08 | End: 2022-07-13

## 2019-05-08 ASSESSMENT — ACTIVITIES OF DAILY LIVING (ADL): CURRENT_FUNCTION: NO ASSISTANCE NEEDED

## 2019-05-08 ASSESSMENT — MIFFLIN-ST. JEOR: SCORE: 1759.58

## 2019-05-13 NOTE — PROGRESS NOTES
SUBJECTIVE:   Iam Kaur is a 74 year old male who presents to clinic today for the following health issues:    HPI     Diabetes New Diagnosis      How often are you checking your blood sugar? Not at all    Have you had any blood sugars above 200?  NA    Have you had any blood sugars below 70?  NA    What symptoms do you notice when your blood sugar is low?  None    What concerns do you have today about your diabetes? None     Do you have any of these symptoms? (Select all that apply)  No numbness or tingling in feet.  No redness, sores or blisters on feet.  No complaints of excessive thirst.  No reports of blurry vision.  No significant changes to weight.     Have you had a diabetic eye exam in the last 12 months? No    Diabetes Management Resources    He was seen for a physical last week and had an A1c completed which revealed a new diagnosis of diabetes so he is here to discuss this further. He states he has already been cutting back on the carb loaded foods and is trying to stay more active. He denies any numbness or tingling in his feet.     Hyperlipidemia Follow-Up      Are you having any of the following symptoms? (Select all that apply)  No complaints of shortness of breath, chest pain or pressure.  No increased sweating or nausea with activity.  No left-sided neck or arm pain.  No complaints of pain in calves when walking 1-2 blocks.    Are you regularly taking any medication or supplement to lower your cholesterol?   Yes- Fish Oil    Are you having muscle aches or other side effects that you think could be caused by your cholesterol lowering medication?  No    Hypertension Follow-up      Do you check your blood pressure regularly outside of the clinic? Yes - 130/80's    Are you following a low salt diet? Yes    Are your blood pressures ever more than 140 on the top number (systolic) OR more   than 90 on the bottom number (diastolic), for example 140/90? No    BP Readings from Last 2 Encounters:  "  05/20/19 130/80   05/08/19 126/82     Hemoglobin A1C (%)   Date Value   05/08/2019 7.1 (H)   12/01/2017 5.8     LDL Cholesterol Calculated (mg/dL)   Date Value   05/08/2019 89   06/29/2018 98     Additional history: none    Reviewed and updated as needed this visit by clinical staff  Tobacco  Allergies  Meds  Med Hx  Surg Hx  Fam Hx  Soc Hx      Reviewed and updated as needed this visit by Provider       Review of Systems  GENERAL: Denies fever, fatigue, weakness, weight gain, or weight loss.  CARDIOVASCULAR: Denies chest pain, shortness of breath, irregular heartbeats, palpitations, or edema.  RESPIRATORY: Denies cough, hemoptysis, and shortness of breath.  NEUROLOGIC: Denies headache, fainting, dizziness, memory loss, numbness, tingling, or seizures.  PSYCHIATRIC: Denies depression, anxiety, mood swings, and thoughts of suicide.     OBJECTIVE:   /80   Pulse 94   Temp 98.4  F (36.9  C) (Temporal)   Resp 16   Ht 1.702 m (5' 7\")   Wt 106.1 kg (234 lb)   SpO2 97%   BMI 36.65 kg/m    Physical Exam  GENERAL: healthy, alert and no distress  RESP: lungs clear to auscultation - no rales, rhonchi or wheezes  CV: regular rate and rhythm, normal S1 S2, no S3 or S4, no murmur, click or rub, no peripheral edema and peripheral pulses strong  NEURO: Normal strength and tone, mentation intact and speech normal. Gait is stable.  PSYCH: mentation appears normal, affect normal/bright  Diabetic foot exam: normal DP and PT pulses, thickened, yellowed, dystrophic toenails bilaterally, no ulcerative lesions, normal sensory exam and normal monofilament exam    Lab Results   Component Value Date    A1C 7.1 05/08/2019    A1C 5.8 12/01/2017    A1C 6.1 07/17/2015    A1C 5.9 05/11/2015    A1C 6.1 07/18/2014     Assessment & Plan       ICD-10-CM    1. Type 2 diabetes mellitus without complication, without long-term current use of insulin (H) E11.9 metFORMIN (GLUCOPHAGE-XR) 500 MG 24 hr tablet     blood glucose monitoring " "(NO BRAND SPECIFIED) meter device kit     blood glucose (NO BRAND SPECIFIED) test strip     blood glucose calibration (NO BRAND SPECIFIED) solution     thin (NO BRAND SPECIFIED) lancets     alcohol swab prep pads     DIABETES EDUCATOR REFERRAL     FOOT EXAM     Albumin Random Urine Quantitative with Creat Ratio     Hemoglobin A1c   2. Essential hypertension with goal blood pressure less than 140/90 I10    3. Class 2 obesity without serious comorbidity with body mass index (BMI) of 37.0 to 37.9 in adult, unspecified obesity type E66.9     Z68.37    4. Hyperlipidemia LDL goal <130 E78.5         I discussed patient's new diagnosis of type 2 diabetes in detail and what this means. I also provided him with a handout.  I recommend starting metformin  mg every evening with dinner.  Common side effects discussed.  I also recommend he start monitoring his glucose 3 times daily, once in the morning, once before dinner and once 2 hours after dinner. I demonstrated how to do this and have also referred him to the diabetic educator to discuss a healthier lifestyle/diabetes management in more detail.     He is working on a lower carb diet with routine exercise.      Diabetes Goals    - A1C < 7%    - AM Fasting <120    - Before supper     - 2 hours after supper <180    - BP < 130/80    - Lipids (LDL) <100    - Triglycerides <150     Instructed to undergo a yearly eye exam which he states he already does.    He is already on aspirin, statin, and an ACEi.    BP is stable.    Follow up in 3 months for a recheck.      BMI:   Estimated body mass index is 36.65 kg/m  as calculated from the following:    Height as of this encounter: 1.702 m (5' 7\").    Weight as of this encounter: 106.1 kg (234 lb).   Weight management plan: Discussed healthy diet and exercise guidelines      Return in about 3 months (around 8/20/2019).    Barak Cameron PA-C  [facility]"

## 2019-05-20 ENCOUNTER — OFFICE VISIT (OUTPATIENT)
Dept: FAMILY MEDICINE | Facility: OTHER | Age: 74
End: 2019-05-20
Payer: COMMERCIAL

## 2019-05-20 ENCOUNTER — TELEPHONE (OUTPATIENT)
Dept: FAMILY MEDICINE | Facility: OTHER | Age: 74
End: 2019-05-20

## 2019-05-20 VITALS
HEART RATE: 94 BPM | BODY MASS INDEX: 36.73 KG/M2 | RESPIRATION RATE: 16 BRPM | SYSTOLIC BLOOD PRESSURE: 130 MMHG | WEIGHT: 234 LBS | OXYGEN SATURATION: 97 % | TEMPERATURE: 98.4 F | DIASTOLIC BLOOD PRESSURE: 80 MMHG | HEIGHT: 67 IN

## 2019-05-20 DIAGNOSIS — E78.5 HYPERLIPIDEMIA LDL GOAL <130: ICD-10-CM

## 2019-05-20 DIAGNOSIS — E66.812 CLASS 2 OBESITY WITHOUT SERIOUS COMORBIDITY WITH BODY MASS INDEX (BMI) OF 37.0 TO 37.9 IN ADULT, UNSPECIFIED OBESITY TYPE: ICD-10-CM

## 2019-05-20 DIAGNOSIS — E11.9 TYPE 2 DIABETES MELLITUS WITHOUT COMPLICATION, WITHOUT LONG-TERM CURRENT USE OF INSULIN (H): ICD-10-CM

## 2019-05-20 DIAGNOSIS — E11.9 TYPE 2 DIABETES MELLITUS WITHOUT COMPLICATION, WITHOUT LONG-TERM CURRENT USE OF INSULIN (H): Primary | ICD-10-CM

## 2019-05-20 DIAGNOSIS — I10 ESSENTIAL HYPERTENSION WITH GOAL BLOOD PRESSURE LESS THAN 140/90: ICD-10-CM

## 2019-05-20 LAB
CREAT UR-MCNC: 100 MG/DL
MICROALBUMIN UR-MCNC: 6 MG/L
MICROALBUMIN/CREAT UR: 5.64 MG/G CR (ref 0–17)

## 2019-05-20 PROCEDURE — 82043 UR ALBUMIN QUANTITATIVE: CPT | Performed by: PHYSICIAN ASSISTANT

## 2019-05-20 PROCEDURE — 99214 OFFICE O/P EST MOD 30 MIN: CPT | Performed by: PHYSICIAN ASSISTANT

## 2019-05-20 PROCEDURE — 99207 C FOOT EXAM  NO CHARGE: CPT | Mod: 25 | Performed by: PHYSICIAN ASSISTANT

## 2019-05-20 RX ORDER — LANCETS
EACH MISCELLANEOUS
Qty: 100 EACH | Refills: 6 | Status: SHIPPED | OUTPATIENT
Start: 2019-05-20 | End: 2019-05-20

## 2019-05-20 RX ORDER — METFORMIN HCL 500 MG
500 TABLET, EXTENDED RELEASE 24 HR ORAL
Qty: 30 TABLET | Refills: 5 | Status: SHIPPED | OUTPATIENT
Start: 2019-05-20 | End: 2019-06-13

## 2019-05-20 RX ORDER — GLUCOSAMINE HCL/CHONDROITIN SU 500-400 MG
CAPSULE ORAL
Qty: 100 EACH | Refills: 3 | Status: SHIPPED | OUTPATIENT
Start: 2019-05-20

## 2019-05-20 RX ORDER — LANCETS
EACH MISCELLANEOUS
Qty: 100 EACH | Refills: 6 | Status: SHIPPED | OUTPATIENT
Start: 2019-05-20

## 2019-05-20 ASSESSMENT — MIFFLIN-ST. JEOR: SCORE: 1755.05

## 2019-05-20 NOTE — TELEPHONE ENCOUNTER
"Pharmacy comments:    \"Please send an RX with directions for Microlet lancets, and how many times per day the alcohol swabs should be used. Thank you.\"  "

## 2019-05-20 NOTE — TELEPHONE ENCOUNTER
I do not know what Rx they are talking about. All of his prescriptions have instructions.    Barak Cameron PA-C

## 2019-05-20 NOTE — TELEPHONE ENCOUNTER
Contacted pharmacy to gather additional information.  They stated that per medicare they need specific directions (how many times to use) pharmacy is unable to just write the info on the Rx they will need a new Rx for the lancets.      thin (NO BRAND SPECIFIED) lancets 100 each 6 5/20/2019  --   Sig: Use with lanceting device

## 2019-05-20 NOTE — PATIENT INSTRUCTIONS
You have a new diagnosis of type 2 diabetes.  I recommend starting you on metformin 500 mg every evening with dinner.  If you have any side effects, let me know.  Start monitoring your glucose 3 times daily, once in the morning, once before dinner and once 2 hours after dinner.    Diabetes Goals    - A1C < 7%    - AM Fasting <120    - Before supper     - 2 hours after supper <180    - BP < 130/80    - Lipids (LDL) <100    - Triglycerides <150     Will send you to the diabetic educator to further discuss an improved diet and management of your diabetes. They will call to schedule.    Undergo a yearly eye exam.    Follow up in 3 months for a recheck.

## 2019-06-13 ENCOUNTER — ALLIED HEALTH/NURSE VISIT (OUTPATIENT)
Dept: EDUCATION SERVICES | Facility: OTHER | Age: 74
End: 2019-06-13
Payer: COMMERCIAL

## 2019-06-13 VITALS — WEIGHT: 236 LBS | BODY MASS INDEX: 36.96 KG/M2

## 2019-06-13 DIAGNOSIS — E11.9 TYPE 2 DIABETES MELLITUS WITHOUT COMPLICATION, WITHOUT LONG-TERM CURRENT USE OF INSULIN (H): ICD-10-CM

## 2019-06-13 PROCEDURE — G0108 DIAB MANAGE TRN  PER INDIV: HCPCS

## 2019-06-13 RX ORDER — METFORMIN HCL 500 MG
1000 TABLET, EXTENDED RELEASE 24 HR ORAL
Qty: 60 TABLET | Refills: 5 | Status: SHIPPED | OUTPATIENT
Start: 2019-06-13 | End: 2019-08-12

## 2019-06-13 NOTE — PATIENT INSTRUCTIONS
Recommend to eat 3 meals per day and space them out 4-6 hours.  Recommend to eat 3-4 carb choices per meal and 0-2 carb choices per snack.  Recommend to call to schedule group diabetes classes in [place].  [phone]  3 classes,   Schedule one per month, does not matter what order you take them in.  Continue to test blood glucose and be active.  Call if concerns.

## 2019-06-13 NOTE — PROGRESS NOTES
"Diabetes Self-Management Education & Support    Diabetes Education Self Management & Training    SUBJECTIVE/OBJECTIVE:  Accompanied by: Spouse  Diabetes education in the past 24 mo: Yes(From provider.)  Focus of Visit: Monitoring  Diabetes type: Type 2  Disease course: Stable  How confident are you filling out medical forms by yourself: Not Assessed  Transportation concerns: No  Other concerns:: None  Cultural Influences/Ethnic Background:  American      Diabetes Symptoms & Complications  Blurred vision: No  Fatigue: No  Neuropathy: No  Foot ulcerations: No  Polyphagia: No  Polyuria: No  Visual change: No  Weakness: No  Weight loss: No  Slow healing wounds: No  Recent Infection(s): No  Other: No  Symptom course: Stable  Weight trend: Stable  Autonomic neuropathy: No  CVA: No  Heart disease: No  Nephropathy: No  Peripheral neuropathy: No  Peripheral Vascular Disease: No  Retinopathy: No    Patient Problem List and Family Medical History reviewed for relevant medical history, current medical status, and diabetes risk factors.    Vitals:  Wt 107 kg (236 lb)   BMI 36.96 kg/m    Estimated body mass index is 36.65 kg/m  as calculated from the following:    Height as of 5/20/19: 1.702 m (5' 7\").    Weight as of 5/20/19: 106.1 kg (234 lb).   Last 3 BP:   BP Readings from Last 3 Encounters:   05/20/19 130/80   05/08/19 126/82   09/14/18 (!) 146/91       History   Smoking Status     Never Smoker   Smokeless Tobacco     Never Used     Comment: No smokers in home       Labs:  Lab Results   Component Value Date    A1C 7.1 05/08/2019     Lab Results   Component Value Date     05/08/2019     Lab Results   Component Value Date    LDL 89 05/08/2019     HDL Cholesterol   Date Value Ref Range Status   05/08/2019 45 >39 mg/dL Final   ]  GFR Estimate   Date Value Ref Range Status   05/08/2019 84 >60 mL/min/[1.73_m2] Final     Comment:     Non  GFR Calc  Starting 12/18/2018, serum creatinine based estimated GFR " (eGFR) will be   calculated using the Chronic Kidney Disease Epidemiology Collaboration   (CKD-EPI) equation.       GFR Estimate If Black   Date Value Ref Range Status   2019 >90 >60 mL/min/[1.73_m2] Final     Comment:      GFR Calc  Starting 2018, serum creatinine based estimated GFR (eGFR) will be   calculated using the Chronic Kidney Disease Epidemiology Collaboration   (CKD-EPI) equation.       Lab Results   Component Value Date    CR 0.88 2019     No results found for: MICROALBUMIN    Healthy Eating  Meals include: Breakfast, Dinner  Breakfast: 11 am-  A few grapes, coffee, sausage Mc Muffin  or  A few grapes, 2 eggs, bowl of oatmeal with sugar and milk, 1 slice of toast with peanut butter  Dinner: 7 pm- meat loaf sandwich with pork and beans and 3 emiliano water drinks  or chicken meatballs and Prego tomato sauce,     3 emiliano water beverages, frozen vegetables and caesar salad  Snacks: 12:30 pm- cheese and crackers,  diet Sprite  Other: 7 am- coffee and 1/2 cookie  Beverages: Water, Coffee, Milk, Alcohol, Diet soda  Has patient met with a dietitian in the past?: Yes    Being Active  Being Active Assessed Today: Yes  Exercise:: Yes  On average, minutes per day of exercise at this level: (walks 2-4 miles per day during the year)  How intense was your typical exercise? : Moderate (like brisk walking)  Barrier to exercise: Arthritis    Monitoring  Monitoring Assessed Today: Yes  Did patient bring glucose meter to appointment? : Yes  Blood Glucose Meter: Contour Next   Home Glucose (Sugar) Monitorin-2 times per day  Blood glucose trend: No change  Low Glucose Range (mg/dL): 110-130  High Glucose Range (mg/dL): 130-140      Taking Medications  Diabetes Medication(s)     Biguanides       metFORMIN (GLUCOPHAGE-XR) 500 MG 24 hr tablet    Take 1 tablet (500 mg) by mouth daily (with dinner)          Taking Medication Assessed Today: Yes  Current Treatments: Oral Agent  (monotherapy)  Problems taking diabetes medications regularly?: Yes  Diabetes medication side effects?: No  Treatment Compliance: All of the time    Problem Solving  Hypoglycemia Frequency: Never    Reducing Risks  Diabetes Risks: Age over 45 years, Family History, Hyperlipidemia  CAD Risks: Diabetes Mellitus, Male sex, Obesity, Family history    Has dilated eye exam at least once a year?: Yes  Sees dentist every 6 months?: Yes  Sees podiatrist (foot doctor)?: No    Healthy Coping  Emotional response to diabetes: Ready to learn  Informal Support system:: Children, Spouse  Patient Activation Measure Survey Score:  SYEDA Score (Last Two) 6/2/2011   SYEDA Raw Score 41   Activation Score 63.2   SYEDA Level 3       ASSESSMENT:  Pt with new diagnosis of T2 DM.  He has started testing his BG one time per day but has some questions re use of meter.  He brought in 3 days of food records for review. Pt eats 2 meals per day.   Pt reports that he is working to decrease his alcohol intake.  Recommend to decrease down to 2 per day.      Patient's most recent   Lab Results   Component Value Date    A1C 7.1 05/08/2019    is not meeting goal of <7.0    INTERVENTION:   Diabetes knowledge and skills assessment:     Patient is knowledgeable in diabetes management concepts related to: Monitoring    Patient needs further education on the following diabetes management concepts: Healthy Eating, Being Active, Monitoring, Taking Medication, Problem Solving, Reducing Risks and Healthy Coping    Based on learning assessment above, most appropriate setting for further diabetes education would be: Group class or Individual setting.    Education provided today on:  AADE Self-Care Behaviors:  Diabetes Pathophysiology  Healthy Eating: carbohydrate counting, consistency in amount, composition, and timing of food intake, weight reduction, portion control and plate planning method  Being Active: relationship to blood glucose  Monitoring: purpose, proper  technique, log and interpret results, individual blood glucose targets, frequency of monitoring, use of glucose control solution and proper sharps disposal  Healthy Coping: recognize feelings about diagnosis and benefits of making appropriate lifestyle changes  Patient was instructed on Contour Next EZ meter.    Opportunities for ongoing education and support in diabetes-self management were discussed.    Pt verbalized understanding of concepts discussed and recommendations provided today.       Education Materials Provided:  Brian Understanding Diabetes Booklet, Safe Disposal Options for Needles & Syringes, BG Log Sheet and My Plate Planner    PLAN:  See Patient Instructions for co-developed, patient-stated behavior change goals.  AVS printed and provided to patient today. See Follow-Up section for recommended follow-up.  Pt to increase metformin XR dose up to 2 tablets with dinner meal daily after he has been on one tablet daily for 2 weeks.    Pt to contact provider if 50% of BG values are running above goal range.  Recommend to attend group diabetes classes in [place].    Laly Poon RN  BSN CDE    Time Spent: 60 minutes  Encounter Type: Individual    Any diabetes medication dose changes were made via the CDE Protocol and Collaborative Practice Agreement with the patient's referring provider. A copy of this encounter was shared with the provider.

## 2019-08-09 DIAGNOSIS — E11.9 TYPE 2 DIABETES MELLITUS WITHOUT COMPLICATION, WITHOUT LONG-TERM CURRENT USE OF INSULIN (H): ICD-10-CM

## 2019-08-12 ENCOUNTER — HOSPITAL ENCOUNTER (EMERGENCY)
Age: 74
Discharge: HOME | End: 2019-08-13
Attending: EMERGENCY MEDICINE
Payer: MEDICARE

## 2019-08-12 DIAGNOSIS — R79.1 ELEVATED INR: ICD-10-CM

## 2019-08-12 DIAGNOSIS — L03.119 CELLULITIS OF LOWER EXTREMITY, UNSPECIFIED LATERALITY: ICD-10-CM

## 2019-08-12 DIAGNOSIS — L57.8 DERMATITIS DUE TO SUNBURN: Primary | ICD-10-CM

## 2019-08-12 LAB
ABSOLUTE EOS #: 0.51 K/UL (ref 0–0.44)
ABSOLUTE IMMATURE GRANULOCYTE: 0.07 K/UL (ref 0–0.3)
ABSOLUTE LYMPH #: 2.29 K/UL (ref 1.1–3.7)
ABSOLUTE MONO #: 1.36 K/UL (ref 0.1–1.2)
BASOPHILS # BLD: 0 % (ref 0–2)
BASOPHILS ABSOLUTE: 0.03 K/UL (ref 0–0.2)
DIFFERENTIAL TYPE: ABNORMAL
EOSINOPHILS RELATIVE PERCENT: 5 % (ref 1–4)
HCT VFR BLD CALC: 43.5 % (ref 40.7–50.3)
HEMOGLOBIN: 14.5 G/DL (ref 13–17)
IMMATURE GRANULOCYTES: 1 %
LYMPHOCYTES # BLD: 21 % (ref 24–43)
MCH RBC QN AUTO: 30 PG (ref 25.2–33.5)
MCHC RBC AUTO-ENTMCNC: 33.3 G/DL (ref 28.4–34.8)
MCV RBC AUTO: 89.9 FL (ref 82.6–102.9)
MONOCYTES # BLD: 13 % (ref 3–12)
NRBC AUTOMATED: 0 PER 100 WBC
PDW BLD-RTO: 13.8 % (ref 11.8–14.4)
PLATELET # BLD: 258 K/UL (ref 138–453)
PLATELET ESTIMATE: ABNORMAL
PMV BLD AUTO: 9.7 FL (ref 8.1–13.5)
RBC # BLD: 4.84 M/UL (ref 4.21–5.77)
RBC # BLD: ABNORMAL 10*6/UL
SEG NEUTROPHILS: 60 % (ref 36–65)
SEGMENTED NEUTROPHILS ABSOLUTE COUNT: 6.44 K/UL (ref 1.5–8.1)
WBC # BLD: 10.7 K/UL (ref 3.5–11.3)
WBC # BLD: ABNORMAL 10*3/UL

## 2019-08-12 PROCEDURE — 80162 ASSAY OF DIGOXIN TOTAL: CPT

## 2019-08-12 PROCEDURE — 83605 ASSAY OF LACTIC ACID: CPT

## 2019-08-12 PROCEDURE — 85025 COMPLETE CBC W/AUTO DIFF WBC: CPT

## 2019-08-12 PROCEDURE — 36415 COLL VENOUS BLD VENIPUNCTURE: CPT

## 2019-08-12 PROCEDURE — 87040 BLOOD CULTURE FOR BACTERIA: CPT

## 2019-08-12 PROCEDURE — 2580000003 HC RX 258: Performed by: EMERGENCY MEDICINE

## 2019-08-12 PROCEDURE — 85610 PROTHROMBIN TIME: CPT

## 2019-08-12 PROCEDURE — 85651 RBC SED RATE NONAUTOMATED: CPT

## 2019-08-12 PROCEDURE — 80048 BASIC METABOLIC PNL TOTAL CA: CPT

## 2019-08-12 PROCEDURE — 83880 ASSAY OF NATRIURETIC PEPTIDE: CPT

## 2019-08-12 PROCEDURE — 80164 ASSAY DIPROPYLACETIC ACD TOT: CPT

## 2019-08-12 PROCEDURE — 96365 THER/PROPH/DIAG IV INF INIT: CPT

## 2019-08-12 PROCEDURE — 96375 TX/PRO/DX INJ NEW DRUG ADDON: CPT

## 2019-08-12 PROCEDURE — 99283 EMERGENCY DEPT VISIT LOW MDM: CPT

## 2019-08-12 PROCEDURE — 6360000002 HC RX W HCPCS: Performed by: EMERGENCY MEDICINE

## 2019-08-12 RX ORDER — METFORMIN HCL 500 MG
1000 TABLET, EXTENDED RELEASE 24 HR ORAL
Qty: 180 TABLET | Refills: 0 | Status: SHIPPED | OUTPATIENT
Start: 2019-08-12 | End: 2020-03-16

## 2019-08-12 RX ORDER — ONDANSETRON 2 MG/ML
4 INJECTION INTRAMUSCULAR; INTRAVENOUS ONCE
Status: COMPLETED | OUTPATIENT
Start: 2019-08-12 | End: 2019-08-12

## 2019-08-12 RX ORDER — 0.9 % SODIUM CHLORIDE 0.9 %
500 INTRAVENOUS SOLUTION INTRAVENOUS ONCE
Status: DISCONTINUED | OUTPATIENT
Start: 2019-08-12 | End: 2019-08-12

## 2019-08-12 RX ORDER — FENTANYL CITRATE 50 UG/ML
50 INJECTION, SOLUTION INTRAMUSCULAR; INTRAVENOUS ONCE
Status: COMPLETED | OUTPATIENT
Start: 2019-08-12 | End: 2019-08-12

## 2019-08-12 RX ORDER — DEXAMETHASONE SODIUM PHOSPHATE 10 MG/ML
10 INJECTION INTRAMUSCULAR; INTRAVENOUS ONCE
Status: COMPLETED | OUTPATIENT
Start: 2019-08-12 | End: 2019-08-12

## 2019-08-12 RX ADMIN — DEXAMETHASONE SODIUM PHOSPHATE 10 MG: 10 INJECTION INTRAMUSCULAR; INTRAVENOUS at 23:47

## 2019-08-12 RX ADMIN — ONDANSETRON 4 MG: 2 INJECTION INTRAMUSCULAR; INTRAVENOUS at 23:46

## 2019-08-12 RX ADMIN — FENTANYL CITRATE 50 MCG: 50 INJECTION, SOLUTION INTRAMUSCULAR; INTRAVENOUS at 23:46

## 2019-08-12 RX ADMIN — PIPERACILLIN SODIUM,TAZOBACTAM SODIUM 3.38 G: 3; .375 INJECTION, POWDER, FOR SOLUTION INTRAVENOUS at 23:51

## 2019-08-12 ASSESSMENT — PAIN DESCRIPTION - ORIENTATION: ORIENTATION: RIGHT;LEFT

## 2019-08-12 ASSESSMENT — PAIN SCALES - GENERAL
PAINLEVEL_OUTOF10: 9
PAINLEVEL_OUTOF10: 8

## 2019-08-12 ASSESSMENT — PAIN DESCRIPTION - DESCRIPTORS: DESCRIPTORS: BURNING

## 2019-08-12 ASSESSMENT — PAIN DESCRIPTION - LOCATION: LOCATION: FOOT

## 2019-08-12 NOTE — TELEPHONE ENCOUNTER
Pending Prescriptions:                       Disp   Refills    metFORMIN (GLUCOPHAGE-XR) 500 MG 24 hr ta*60 tab*5            Sig: Take 2 tablets (1,000 mg) by mouth daily (with           dinner)    Sent 6/13/2019 with 6 month supply. Sent remaining 3 refills as a 90 day supply    Velia Espinoza, RN, BSN

## 2019-08-13 VITALS
TEMPERATURE: 97.8 F | RESPIRATION RATE: 18 BRPM | OXYGEN SATURATION: 95 % | DIASTOLIC BLOOD PRESSURE: 41 MMHG | HEART RATE: 68 BPM | WEIGHT: 260 LBS | SYSTOLIC BLOOD PRESSURE: 101 MMHG

## 2019-08-13 LAB
ANION GAP SERPL CALCULATED.3IONS-SCNC: 15 MMOL/L (ref 9–17)
BNP INTERPRETATION: ABNORMAL
BUN BLDV-MCNC: 20 MG/DL (ref 8–23)
BUN/CREAT BLD: 25 (ref 9–20)
CALCIUM SERPL-MCNC: 8.6 MG/DL (ref 8.6–10.4)
CHLORIDE BLD-SCNC: 98 MMOL/L (ref 98–107)
CO2: 24 MMOL/L (ref 20–31)
CREAT SERPL-MCNC: 0.81 MG/DL (ref 0.7–1.2)
DIGOXIN DATE LAST DOSE: ABNORMAL
DIGOXIN DOSE AMOUNT: ABNORMAL
DIGOXIN DOSE TIME: ABNORMAL
DIGOXIN LEVEL: <0.3 NG/ML (ref 0.5–2)
GFR AFRICAN AMERICAN: >60 ML/MIN
GFR NON-AFRICAN AMERICAN: >60 ML/MIN
GFR SERPL CREATININE-BSD FRML MDRD: ABNORMAL ML/MIN/{1.73_M2}
GFR SERPL CREATININE-BSD FRML MDRD: ABNORMAL ML/MIN/{1.73_M2}
GLUCOSE BLD-MCNC: 109 MG/DL (ref 70–99)
INR BLD: 6.2
LACTIC ACID: 1.7 MMOL/L (ref 0.5–2.2)
POTASSIUM SERPL-SCNC: 4.1 MMOL/L (ref 3.7–5.3)
PRO-BNP: 320 PG/ML
PROTHROMBIN TIME: 59.7 SEC (ref 9.7–11.6)
SEDIMENTATION RATE, ERYTHROCYTE: 8 MM (ref 0–15)
SODIUM BLD-SCNC: 137 MMOL/L (ref 135–144)
VALPROIC ACID LEVEL: <3 UG/ML (ref 50–125)
VALPROIC DATE LAST DOSE: ABNORMAL
VALPROIC DOSE AMOUNT: ABNORMAL
VALPROIC TIME LAST DOSE: ABNORMAL

## 2019-08-13 PROCEDURE — 96375 TX/PRO/DX INJ NEW DRUG ADDON: CPT

## 2019-08-13 PROCEDURE — 6360000002 HC RX W HCPCS: Performed by: EMERGENCY MEDICINE

## 2019-08-13 PROCEDURE — 6370000000 HC RX 637 (ALT 250 FOR IP): Performed by: EMERGENCY MEDICINE

## 2019-08-13 RX ORDER — ALBUTEROL SULFATE 90 UG/1
2 AEROSOL, METERED RESPIRATORY (INHALATION) EVERY 6 HOURS PRN
COMMUNITY

## 2019-08-13 RX ORDER — TAMSULOSIN HYDROCHLORIDE 0.4 MG/1
0.8 CAPSULE ORAL DAILY
COMMUNITY

## 2019-08-13 RX ORDER — WARFARIN SODIUM 5 MG/1
5 TABLET ORAL
COMMUNITY

## 2019-08-13 RX ORDER — CLOBETASOL PROPIONATE 0.5 MG/G
1 OINTMENT TOPICAL 2 TIMES DAILY
Qty: 60 G | Refills: 2 | Status: SHIPPED | OUTPATIENT
Start: 2019-08-13

## 2019-08-13 RX ORDER — POTASSIUM CHLORIDE 7.45 MG/ML
10 INJECTION INTRAVENOUS ONCE
COMMUNITY

## 2019-08-13 RX ORDER — AMIODARONE HYDROCHLORIDE 200 MG/1
200 TABLET ORAL DAILY
COMMUNITY

## 2019-08-13 RX ORDER — CLOBETASOL PROPIONATE 0.5 MG/G
OINTMENT TOPICAL ONCE
Status: COMPLETED | OUTPATIENT
Start: 2019-08-13 | End: 2019-08-13

## 2019-08-13 RX ORDER — NITROGLYCERIN 0.4 MG/1
0.4 TABLET SUBLINGUAL EVERY 5 MIN PRN
COMMUNITY

## 2019-08-13 RX ORDER — METOPROLOL SUCCINATE 100 MG/1
100 TABLET, EXTENDED RELEASE ORAL DAILY
COMMUNITY

## 2019-08-13 RX ORDER — DULOXETIN HYDROCHLORIDE 60 MG/1
60 CAPSULE, DELAYED RELEASE ORAL DAILY
COMMUNITY

## 2019-08-13 RX ORDER — CEPHALEXIN 500 MG/1
500 CAPSULE ORAL 3 TIMES DAILY
Qty: 21 CAPSULE | Refills: 0 | Status: SHIPPED | OUTPATIENT
Start: 2019-08-13 | End: 2019-08-20

## 2019-08-13 RX ORDER — PREGABALIN 100 MG/1
300 CAPSULE ORAL DAILY
COMMUNITY

## 2019-08-13 RX ORDER — ESOMEPRAZOLE MAGNESIUM 40 MG/1
40 FOR SUSPENSION ORAL DAILY
COMMUNITY

## 2019-08-13 RX ORDER — ALBUTEROL SULFATE 2.5 MG/3ML
2.5 SOLUTION RESPIRATORY (INHALATION) EVERY 6 HOURS PRN
COMMUNITY

## 2019-08-13 RX ORDER — FUROSEMIDE 40 MG/1
40 TABLET ORAL 2 TIMES DAILY
COMMUNITY

## 2019-08-13 RX ORDER — OXYCODONE AND ACETAMINOPHEN 10; 325 MG/1; MG/1
1 TABLET ORAL EVERY 6 HOURS PRN
COMMUNITY

## 2019-08-13 RX ORDER — MORPHINE SULFATE 15 MG/1
15 TABLET ORAL EVERY 8 HOURS
COMMUNITY

## 2019-08-13 RX ORDER — ATORVASTATIN CALCIUM 40 MG/1
40 TABLET, FILM COATED ORAL DAILY
COMMUNITY

## 2019-08-13 RX ORDER — DUTASTERIDE 0.5 MG/1
0.5 CAPSULE, LIQUID FILLED ORAL DAILY
COMMUNITY

## 2019-08-13 RX ORDER — LISINOPRIL 2.5 MG/1
2.5 TABLET ORAL DAILY
COMMUNITY

## 2019-08-13 RX ORDER — MORPHINE SULFATE 4 MG/ML
4 INJECTION, SOLUTION INTRAMUSCULAR; INTRAVENOUS ONCE
Status: COMPLETED | OUTPATIENT
Start: 2019-08-13 | End: 2019-08-13

## 2019-08-13 RX ADMIN — MORPHINE SULFATE 4 MG: 4 INJECTION INTRAVENOUS at 01:28

## 2019-08-13 RX ADMIN — CLOBETASOL PROPIONATE: 0.5 OINTMENT TOPICAL at 01:33

## 2019-08-13 ASSESSMENT — PAIN DESCRIPTION - PAIN TYPE: TYPE: CHRONIC PAIN

## 2019-08-13 ASSESSMENT — PAIN SCALES - GENERAL
PAINLEVEL_OUTOF10: 6
PAINLEVEL_OUTOF10: 3
PAINLEVEL_OUTOF10: 10

## 2019-08-13 ASSESSMENT — PAIN SCALES - WONG BAKER: WONGBAKER_NUMERICALRESPONSE: 2

## 2019-08-19 LAB
CULTURE: NORMAL
CULTURE: NORMAL
Lab: NORMAL
Lab: NORMAL
SPECIMEN DESCRIPTION: NORMAL
SPECIMEN DESCRIPTION: NORMAL

## 2019-09-28 ENCOUNTER — HEALTH MAINTENANCE LETTER (OUTPATIENT)
Age: 74
End: 2019-09-28

## 2020-03-12 DIAGNOSIS — E11.9 TYPE 2 DIABETES MELLITUS WITHOUT COMPLICATION, WITHOUT LONG-TERM CURRENT USE OF INSULIN (H): ICD-10-CM

## 2020-03-14 NOTE — TELEPHONE ENCOUNTER
Pending Prescriptions:                       Disp   Refills    metFORMIN (GLUCOPHAGE-XR) 500 MG 24 hr ta*60 tab*0            Sig: TAKE 2 TABLETS BY MOUTH ONCE DAILY WITH  DINNER    Routing refill request to provider for review/approval because:  Labs not current:  A1c  A break in medication - Sent 8/12/19 with 3 month supply.  Patient needs to be seen because overdue for follow up visit        Kaia Boucher, BINDUN, RN, PHN

## 2020-03-15 ENCOUNTER — HEALTH MAINTENANCE LETTER (OUTPATIENT)
Age: 75
End: 2020-03-15

## 2020-03-16 RX ORDER — METFORMIN HCL 500 MG
TABLET, EXTENDED RELEASE 24 HR ORAL
Qty: 180 TABLET | Refills: 0 | Status: SHIPPED | OUTPATIENT
Start: 2020-03-16 | End: 2020-05-07

## 2020-04-17 DIAGNOSIS — I10 ESSENTIAL HYPERTENSION WITH GOAL BLOOD PRESSURE LESS THAN 140/90: ICD-10-CM

## 2020-04-20 NOTE — TELEPHONE ENCOUNTER
Patient is due for yearly exam/diabetic/med follow up.   Please schedule and verify if he has enough medication to last until appointment.     Mulu Mcdaniels, RN, BSN

## 2020-04-21 RX ORDER — LISINOPRIL 10 MG/1
TABLET ORAL
Qty: 90 TABLET | Refills: 0 | OUTPATIENT
Start: 2020-04-21

## 2020-04-21 NOTE — TELEPHONE ENCOUNTER
Patient called back today returning a call that he received. Explained to patient that the message was to see if he was able to set up an appointment. Patient stated that he will call after he gets back on May 7th 2020. Patient stated that he had enough medication tell then.    Thank you

## 2020-05-07 ENCOUNTER — VIRTUAL VISIT (OUTPATIENT)
Dept: FAMILY MEDICINE | Facility: OTHER | Age: 75
End: 2020-05-07
Payer: COMMERCIAL

## 2020-05-07 DIAGNOSIS — I10 ESSENTIAL HYPERTENSION WITH GOAL BLOOD PRESSURE LESS THAN 140/90: ICD-10-CM

## 2020-05-07 DIAGNOSIS — E78.5 HYPERLIPIDEMIA LDL GOAL <130: ICD-10-CM

## 2020-05-07 DIAGNOSIS — Z78.9 ALCOHOL USE: ICD-10-CM

## 2020-05-07 DIAGNOSIS — R19.4 FREQUENT BOWEL MOVEMENTS: ICD-10-CM

## 2020-05-07 DIAGNOSIS — K62.7 RADIATION PROCTITIS: ICD-10-CM

## 2020-05-07 DIAGNOSIS — E11.9 TYPE 2 DIABETES MELLITUS WITHOUT COMPLICATION, WITHOUT LONG-TERM CURRENT USE OF INSULIN (H): Primary | ICD-10-CM

## 2020-05-07 PROCEDURE — 99214 OFFICE O/P EST MOD 30 MIN: CPT | Mod: 95 | Performed by: PHYSICIAN ASSISTANT

## 2020-05-07 RX ORDER — ATORVASTATIN CALCIUM 10 MG/1
10 TABLET, FILM COATED ORAL DAILY
Qty: 90 TABLET | Refills: 0 | Status: SHIPPED | OUTPATIENT
Start: 2020-05-07 | End: 2020-08-10

## 2020-05-07 RX ORDER — METFORMIN HCL 500 MG
TABLET, EXTENDED RELEASE 24 HR ORAL
Qty: 180 TABLET | Refills: 0 | Status: SHIPPED | OUTPATIENT
Start: 2020-05-07 | End: 2020-08-10

## 2020-05-07 RX ORDER — LISINOPRIL 10 MG/1
10 TABLET ORAL DAILY
Qty: 90 TABLET | Refills: 0 | Status: SHIPPED | OUTPATIENT
Start: 2020-05-07 | End: 2020-08-10

## 2020-05-07 NOTE — PROGRESS NOTES
"Iam Kaur is a 75 year old male who is being evaluated via a billable video visit.      The patient has been notified of following:     \"This video visit will be conducted via a call between you and your physician/provider. We have found that certain health care needs can be provided without the need for an in-person physical exam.  This service lets us provide the care you need with a video conversation.  If a prescription is necessary we can send it directly to your pharmacy.  If lab work is needed we can place an order for that and you can then stop by our lab to have the test done at a later time.    Video visits are billed at different rates depending on your insurance coverage.  Please reach out to your insurance provider with any questions.    If during the course of the call the physician/provider feels a video visit is not appropriate, you will not be charged for this service.\"    Patient has given verbal consent for Video visit? Yes    How would you like to obtain your AVS? Jose Luishart    Patient would like the video invitation sent by: Text to cell phone: [phone]    Will anyone else be joining your video visit? No    Subjective     Iam Kaur is a 75 year old male who presents to clinic today for the following health issues:    HPI  Diabetes Follow-up    How often are you checking your blood sugar? Two times daily  Blood sugar testing frequency justification:  Patient modifying lifestyle changes (diet, exercise) with blood sugars  What time of day are you checking your blood sugars (select all that apply)?  Before meals and After meals  Have you had any blood sugars above 200?  No  Have you had any blood sugars below 70?  No    What symptoms do you notice when your blood sugar is low?  None    What concerns do you have today about your diabetes? None     Do you have any of these symptoms? (Select all that apply)  No numbness or tingling in feet.  No redness, sores or blisters on " feet.  No complaints of excessive thirst.  No reports of blurry vision.  No significant changes to weight.    Have you had a diabetic eye exam in the last 12 months? No    Ever since his diagnosis of type 2 diabetes last year, he has been working on eating a healthier diet and exercising daily as he walks 3-4 miles every day. He has also cut back on his alcohol use. He was previously drinking 4, emiliano water mixed drinks nightly and is now drinking 2. He states his chronic, frequent stools have decreased since he has made these lifestyle changes. He is tolerating metformin without any issues.    Hyperlipidemia Follow-Up      Are you regularly taking any medication or supplement to lower your cholesterol?   Yes- Atorvastatin     Are you having muscle aches or other side effects that you think could be caused by your cholesterol lowering medication?  No    Hypertension Follow-up      Do you check your blood pressure regularly outside of the clinic? Yes     Are you following a low salt diet? Yes    Are your blood pressures ever more than 140 on the top number (systolic) OR more   than 90 on the bottom number (diastolic), for example 140/90? Yes    Mostly running below 140 systolic with occasional readings in the 140s.    BP Readings from Last 2 Encounters:   05/20/19 130/80   05/08/19 126/82     Hemoglobin A1C (%)   Date Value   05/08/2019 7.1 (H)   12/01/2017 5.8     LDL Cholesterol Calculated (mg/dL)   Date Value   05/08/2019 89   06/29/2018 98         How many servings of fruits and vegetables do you eat daily?  2-3    On average, how many sweetened beverages do you drink each day (Examples: soda, juice, sweet tea, etc.  Do NOT count diet or artificially sweetened beverages)?   1    How many days per week do you exercise enough to make your heart beat faster? 7    How many minutes a day do you exercise enough to make your heart beat faster? 30 - 60    How many days per week do you miss taking your medication?  "0    Video Start Time: 11:39 am    Reviewed and updated as needed this visit by Provider  Allergies  Meds  Problems  Med Hx    Review of Systems   GENERAL: Denies fever, fatigue, weakness, weight gain, or weight loss.  CARDIOVASCULAR: Denies chest pain, shortness of breath, irregular heartbeats, palpitations, or edema.  RESPIRATORY: Denies cough, hemoptysis, and shortness of breath.  NEUROLOGIC: Denies headache, fainting, dizziness, memory loss, numbness, tingling, or seizures.  PSYCHIATRIC: Denies depression, anxiety, mood swings, and thoughts of suicide.      Objective    There were no vitals taken for this visit.  Estimated body mass index is 36.96 kg/m  as calculated from the following:    Height as of 5/20/19: 1.702 m (5' 7\").    Weight as of 6/13/19: 107 kg (236 lb).  Physical Exam     GENERAL: healthy, alert and no distress  EYES: Eyes grossly normal to inspection, conjunctivae and sclerae normal  RESP: no audible wheeze, cough, or visible cyanosis.  No visible retractions or increased work of breathing.  Able to speak fully in complete sentences.  NEURO: Cranial nerves grossly intact, mentation intact and speech normal. He moves all extremities equally.   PSYCH: mentation appears normal, affect normal/bright, judgement and insight intact, normal speech and appearance well-groomed    Diagnostic Test Results:      Lab Results   Component Value Date    A1C 7.1 05/08/2019    A1C 5.8 12/01/2017    A1C 6.1 07/17/2015    A1C 5.9 05/11/2015    A1C 6.1 07/18/2014        Assessment & Plan       ICD-10-CM    1. Type 2 diabetes mellitus without complication, without long-term current use of insulin (H)  E11.9 metFORMIN (GLUCOPHAGE-XR) 500 MG 24 hr tablet   2. Essential hypertension with goal blood pressure less than 140/90  I10 lisinopril (ZESTRIL) 10 MG tablet   3. Hyperlipidemia LDL goal <130  E78.5 atorvastatin (LIPITOR) 10 MG tablet   4. Frequent bowel movements  R19.4    5. Radiation proctitis  K62.7    6. " Alcohol use  Z72.89         1. New diagnosis of type 2 diabetes last year. He is tolerating metformin without any issues and had made lifestyle changes as noted above. He will continue with frequent exercise and current dose of metformin. He is due for labs now but given the COVID-19 pandemic, will complete fasting labs during clinic follow up in 3 months.     2. His home pressures have been running below 140 systolic consistently. Will continue current dose of lisinopril along with working on a low salt diet.    3. Continue current dose of Lipitor. Will check updated fasting lipid panel in 3 months.    4-5. His chronic, frequent bowel movements have improved since making lifestyle changes. He continues with Imodium as needed.    6. He has cut back to 2 emiliano rust per night. I recommend he be careful with how much he is drinking as this can cause chronic liver issues among other things. H    Follow up in 3 months in the clinic with fasting labs.         Barak Cameron PA-C  [facility]      Video-Visit Details    Type of service:  Video Visit    Video End Time: 11:53 am    Originating Location (pt. Location): Home    Distant Location (provider location):  [facility]     Platform used for Video Visit: Saúl Cameron PA-C

## 2020-08-04 ENCOUNTER — TELEPHONE (OUTPATIENT)
Dept: FAMILY MEDICINE | Facility: OTHER | Age: 75
End: 2020-08-04

## 2020-08-04 DIAGNOSIS — E78.5 HYPERLIPIDEMIA LDL GOAL <130: ICD-10-CM

## 2020-08-04 DIAGNOSIS — E11.9 TYPE 2 DIABETES MELLITUS WITHOUT COMPLICATION, WITHOUT LONG-TERM CURRENT USE OF INSULIN (H): Primary | ICD-10-CM

## 2020-08-04 DIAGNOSIS — I10 ESSENTIAL HYPERTENSION WITH GOAL BLOOD PRESSURE LESS THAN 140/90: ICD-10-CM

## 2020-08-04 NOTE — TELEPHONE ENCOUNTER
Lipid panel pended for review. Patient also has an A1c order as future. Will route to  to place any other labs.   Tri Martins MA

## 2020-08-04 NOTE — TELEPHONE ENCOUNTER
Reason for Call:  Other appointment and call back    Detailed comments: Patient called clinic wondering if he needs labs before his virtual appt with LYNNETTE on Monday 8/10. Please call patient back to let him know.    Phone Number Patient can be reached at: Home number on file [phone] (home)    Best Time: any    Can we leave a detailed message on this number? YES    Call taken on 8/4/2020 at 8:02 AM by Bhaskar Hernandez

## 2020-08-05 DIAGNOSIS — E78.5 HYPERLIPIDEMIA LDL GOAL <130: ICD-10-CM

## 2020-08-05 DIAGNOSIS — I10 ESSENTIAL HYPERTENSION WITH GOAL BLOOD PRESSURE LESS THAN 140/90: ICD-10-CM

## 2020-08-05 DIAGNOSIS — E11.9 TYPE 2 DIABETES MELLITUS WITHOUT COMPLICATION, WITHOUT LONG-TERM CURRENT USE OF INSULIN (H): ICD-10-CM

## 2020-08-05 LAB
ANION GAP SERPL CALCULATED.3IONS-SCNC: 2 MMOL/L (ref 3–14)
BUN SERPL-MCNC: 17 MG/DL (ref 7–30)
CALCIUM SERPL-MCNC: 9.1 MG/DL (ref 8.5–10.1)
CHLORIDE SERPL-SCNC: 107 MMOL/L (ref 94–109)
CHOLEST SERPL-MCNC: 157 MG/DL
CO2 SERPL-SCNC: 29 MMOL/L (ref 20–32)
CREAT SERPL-MCNC: 0.93 MG/DL (ref 0.66–1.25)
CREAT UR-MCNC: 135 MG/DL
GFR SERPL CREATININE-BSD FRML MDRD: 79 ML/MIN/{1.73_M2}
GLUCOSE SERPL-MCNC: 113 MG/DL (ref 70–99)
HBA1C MFR BLD: 5.6 % (ref 0–5.6)
HDLC SERPL-MCNC: 51 MG/DL
LDLC SERPL CALC-MCNC: 92 MG/DL
MICROALBUMIN UR-MCNC: 9 MG/L
MICROALBUMIN/CREAT UR: 6.33 MG/G CR (ref 0–17)
NONHDLC SERPL-MCNC: 106 MG/DL
POTASSIUM SERPL-SCNC: 4.6 MMOL/L (ref 3.4–5.3)
SODIUM SERPL-SCNC: 138 MMOL/L (ref 133–144)
TRIGL SERPL-MCNC: 71 MG/DL

## 2020-08-05 PROCEDURE — 83036 HEMOGLOBIN GLYCOSYLATED A1C: CPT | Performed by: PHYSICIAN ASSISTANT

## 2020-08-05 PROCEDURE — 82043 UR ALBUMIN QUANTITATIVE: CPT | Performed by: PHYSICIAN ASSISTANT

## 2020-08-05 PROCEDURE — 80061 LIPID PANEL: CPT | Performed by: PHYSICIAN ASSISTANT

## 2020-08-05 PROCEDURE — 36415 COLL VENOUS BLD VENIPUNCTURE: CPT | Performed by: PHYSICIAN ASSISTANT

## 2020-08-05 PROCEDURE — 80048 BASIC METABOLIC PNL TOTAL CA: CPT | Performed by: PHYSICIAN ASSISTANT

## 2020-08-07 NOTE — PROGRESS NOTES
"Iam Kaur is a 75 year old male who is being evaluated via a billable video visit.      The patient has been notified of following:     \"This video visit will be conducted via a call between you and your physician/provider. We have found that certain health care needs can be provided without the need for an in-person physical exam.  This service lets us provide the care you need with a video conversation.  If a prescription is necessary we can send it directly to your pharmacy.  If lab work is needed we can place an order for that and you can then stop by our lab to have the test done at a later time.    Video visits are billed at different rates depending on your insurance coverage.  Please reach out to your insurance provider with any questions.    If during the course of the call the physician/provider feels a video visit is not appropriate, you will not be charged for this service.\"    Patient has given verbal consent for Video visit? Yes  How would you like to obtain your AVS? Mail a copy  If you are dropped from the video visit, the video invite should be resent to: Text to cell phone: [phone]  Will anyone else be joining your video visit? No    Subjective     Iam Kaur is a 75 year old male who presents today via video visit for the following health issues:    HPI    Diabetes Follow-up    How often are you checking your blood sugar? Two times daily  Blood sugar testing frequency justification:  -  What time of day are you checking your blood sugars (select all that apply)?  Before meals  Have you had any blood sugars above 200?  No  Have you had any blood sugars below 70?  No    What symptoms do you notice when your blood sugar is low?  None    What concerns do you have today about your diabetes? None     Do you have any of these symptoms? (Select all that apply)  No numbness or tingling in feet.  No redness, sores or blisters on feet.  No complaints of excessive thirst.  No reports " "of blurry vision.  No significant changes to weight.    Have you had a diabetic eye exam in the last 12 months? No    His glucose has been consistently around 120-130. He has lost 5 pounds and is eating well. He is trying to stay active and is currently up at his cabin in [place]. He has been doing a lot of fishing.     BP Readings from Last 2 Encounters:   05/20/19 130/80   05/08/19 126/82     Hemoglobin A1C (%)   Date Value   08/05/2020 5.6   05/08/2019 7.1 (H)     LDL Cholesterol Calculated (mg/dL)   Date Value   08/05/2020 92   05/08/2019 89         How many servings of fruits and vegetables do you eat daily?  2-3    On average, how many sweetened beverages do you drink each day (Examples: soda, juice, sweet tea, etc.  Do NOT count diet or artificially sweetened beverages)?   0    How many days per week do you exercise enough to make your heart beat faster? 7    How many minutes a day do you exercise enough to make your heart beat faster? 30 - 60    How many days per week do you miss taking your medication? 0      Video Start Time: 3:18 pm    Annual Wellness Visit    Are you in the first 12 months of your Medicare Part B coverage?  No    Physical Health:    In general, how would you rate your overall physical health? excellent    Outside of work, how many days during the week do you exercise?4-5 days/week    Outside of work, approximately how many minutes a day do you exercise?30-45 minutes    If you drink alcohol do you typically have >3 drinks per day or >7 drinks per week? No    Do you usually eat at least 4 servings of fruit and vegetables a day, include whole grains & fiber and avoid regularly eating high fat or \"junk\" foods? Yes    Do you have any problems taking medications regularly? No    Do you have any side effects from medications? none    Needs assistance for the following daily activities: no assistance needed    Which of the following safety concerns are present in your home?  none " identified     Hearing impairment: No    In the past 6 months, have you been bothered by leaking of urine? no    Mental Health:    In general, how would you rate your overall mental or emotional health? excellent  PHQ-2 Score:      Do you feel safe in your environment? Yes    Have you ever done Advance Care Planning? (For example, a Health Directive, POLST, or a discussion with a medical provider or your loved ones about your wishes)? Yes, patient states has an Advance Care Planning document and will bring a copy to the clinic.    Fall risk:  Fallen 2 or more times in the past year?: No  Any fall with injury in the past year?: No  click delete button to remove this line now  Cognitive Screenin) Repeat 3 items-Normal  2) Clock draw: NORMAL  3) 3 item recall: Recalls 3 objects  Results: 3 items recalled: COGNITIVE IMPAIRMENT LESS LIKELY    Mini-CogTM Copyright S [employer]. Licensed by the author for use in [facility]; reprinted with permission (bry@[facility]). All rights reserved.      Do you have sleep apnea, excessive snoring or daytime drowsiness?: yes    Current providers sharing in care for this patient include:   Patient Care Team:  Barak Cameron PA-C as PCP - General (Physician Assistant)  Barak Cameron PA-C as Assigned PCP  Laly Poon RN as Diabetes Educator (Diabetes Education)    Reviewed and updated as needed this visit by Provider  Allergies  Meds  Problems  Med Hx    Review of Systems   Constitutional, HEENT, cardiovascular, pulmonary, gi and gu systems are negative, except as otherwise noted.      Objective    Physical Exam     GENERAL: Healthy, alert and no distress  EYES: Eyes grossly normal to inspection.  No discharge or erythema, or obvious scleral/conjunctival abnormalities.  RESP: No audible wheeze, cough, or visible cyanosis.  No visible retractions or increased work of breathing.    SKIN: Visible skin clear. No significant rash, abnormal pigmentation or  lesions.  NEURO: Cranial nerves grossly intact.  Mentation and speech appropriate for age.  PSYCH: Mentation appears normal, affect normal/bright, judgement and insight intact, normal speech and appearance well-groomed.    Diagnostic Test Results:  Lab Results   Component Value Date    A1C 5.6 08/05/2020    A1C 7.1 05/08/2019    A1C 5.8 12/01/2017    A1C 6.1 07/17/2015    A1C 5.9 05/11/2015           Assessment & Plan       ICD-10-CM    1. Medicare annual wellness visit, subsequent  Z00.00    2. Hyperlipidemia LDL goal <130  E78.5 atorvastatin (LIPITOR) 10 MG tablet     OFFICE/OUTPT VISIT,EST,LEVL IV   3. Essential hypertension with goal blood pressure less than 140/90  I10 lisinopril (ZESTRIL) 10 MG tablet     OFFICE/OUTPT VISIT,EST,LEVL IV   4. Type 2 diabetes mellitus without complication, without long-term current use of insulin (H)  E11.9 metFORMIN (GLUCOPHAGE) 500 MG tablet     OFFICE/OUTPT VISIT,EST,LEVL IV   5. Obstructive sleep apnea syndrome  G47.33 OFFICE/OUTPT VISIT,EST,LEVL IV        1. Medicare Wellness visit completed.    2. Stable, continue Lipitor.    3. Continue current dose of lisinopril.    4. His A1c has improved to 5.6 which is fantastic. I congratulated him on this improvement. He will continue with a low carb, low sugar diet with routine exercise. His metformin XR has been recalled so will switch to regular metformin 500 mg twice daily. Will plan on follow up next Spring when he returns from [place].    5. Continue CPAP.      Barak Cameron PA-C  [facility]      Video-Visit Details    Type of service:  Video Visit    Video End Time: 3:31 pm    Originating Location (pt. Location): Home    Distant Location (provider location):  [facility]     Platform used for Video Visit: Saúl aCmeron PA-C

## 2020-08-10 ENCOUNTER — VIRTUAL VISIT (OUTPATIENT)
Dept: FAMILY MEDICINE | Facility: OTHER | Age: 75
End: 2020-08-10
Payer: COMMERCIAL

## 2020-08-10 DIAGNOSIS — G47.33 OBSTRUCTIVE SLEEP APNEA SYNDROME: ICD-10-CM

## 2020-08-10 DIAGNOSIS — Z00.00 MEDICARE ANNUAL WELLNESS VISIT, SUBSEQUENT: Primary | ICD-10-CM

## 2020-08-10 DIAGNOSIS — E11.9 TYPE 2 DIABETES MELLITUS WITHOUT COMPLICATION, WITHOUT LONG-TERM CURRENT USE OF INSULIN (H): ICD-10-CM

## 2020-08-10 DIAGNOSIS — I10 ESSENTIAL HYPERTENSION WITH GOAL BLOOD PRESSURE LESS THAN 140/90: ICD-10-CM

## 2020-08-10 DIAGNOSIS — E78.5 HYPERLIPIDEMIA LDL GOAL <130: ICD-10-CM

## 2020-08-10 PROCEDURE — 99213 OFFICE O/P EST LOW 20 MIN: CPT | Mod: 25 | Performed by: PHYSICIAN ASSISTANT

## 2020-08-10 PROCEDURE — G0439 PPPS, SUBSEQ VISIT: HCPCS | Mod: 95 | Performed by: PHYSICIAN ASSISTANT

## 2020-08-10 RX ORDER — LISINOPRIL 10 MG/1
10 TABLET ORAL DAILY
Qty: 90 TABLET | Refills: 3 | Status: SHIPPED | OUTPATIENT
Start: 2020-08-10 | End: 2021-05-17

## 2020-08-10 RX ORDER — ATORVASTATIN CALCIUM 10 MG/1
10 TABLET, FILM COATED ORAL DAILY
Qty: 90 TABLET | Refills: 3 | Status: SHIPPED | OUTPATIENT
Start: 2020-08-10 | End: 2021-05-17

## 2020-09-29 ENCOUNTER — TRANSFERRED RECORDS (OUTPATIENT)
Dept: HEALTH INFORMATION MANAGEMENT | Facility: CLINIC | Age: 75
End: 2020-09-29

## 2021-01-09 ENCOUNTER — HEALTH MAINTENANCE LETTER (OUTPATIENT)
Age: 76
End: 2021-01-09

## 2021-05-08 ENCOUNTER — HEALTH MAINTENANCE LETTER (OUTPATIENT)
Age: 76
End: 2021-05-08

## 2021-05-12 DIAGNOSIS — E11.9 TYPE 2 DIABETES MELLITUS WITHOUT COMPLICATION, WITHOUT LONG-TERM CURRENT USE OF INSULIN (H): ICD-10-CM

## 2021-05-12 NOTE — PATIENT INSTRUCTIONS
Preventive Health Recommendations:     See your health care provider every year to    Review health changes.     Discuss preventive care.      Review your medicines if your doctor has prescribed any.      Talk with your health care provider about whether you should have a test to screen for prostate cancer (PSA).    Every 3 years, have a diabetes test (fasting glucose). If you are at risk for diabetes, you should have this test more often.    Every 5 years, have a cholesterol test. Have this test more often if you are at risk for high cholesterol or heart disease.     Every 10 years, have a colonoscopy. Or, have a yearly FIT test (stool test). These exams will check for colon cancer.    Talk to with your health care provider about screening for Abdominal Aortic Aneurysm if you have a family history of AAA or have a history of smoking.    Shots:     Get a flu shot each year.     Get a tetanus shot every 10 years.     Talk to your doctor about your pneumonia vaccines. There are now two you should receive - Pneumovax (PPSV 23) and Prevnar (PCV 13).     Talk to your pharmacist about a shingles vaccine.     Talk to your doctor about the hepatitis B vaccine.  Nutrition:     Eat at least 5 servings of fruits and vegetables each day.     Eat whole-grain bread, whole-wheat pasta and brown rice instead of white grains and rice.     Get adequate Calcium and Vitamin D.   Lifestyle    Exercise for at least 150 minutes a week (30 minutes a day, 5 days a week). This will help you control your weight and prevent disease.     Limit alcohol to one drink per day.     No smoking.     Wear sunscreen to prevent skin cancer.    See your dentist every six months for an exam and cleaning.    See your eye doctor every 1 to 2 years to screen for conditions such as glaucoma, macular degeneration, cataracts, etc.    Continue current medications.    Will have you see orthopedics and the sleep specialist. They will call to schedule.    Follow  up in 6 months.

## 2021-05-12 NOTE — TELEPHONE ENCOUNTER
Pending Prescriptions:                       Disp   Refills    metFORMIN (GLUCOPHAGE) 500 MG tablet       180 ta*2        Sig: Take 1 tablet (500 mg) by mouth 2 times daily (with           meals)    Routing refill request to provider for review/approval because:  Labs not current:  Patient has documented A1c within the specified period of time.    If HgbA1C is 8 or greater, it needs to be on file within the past 3 months.  If less than 8, must be on file within the past 6 months.          Recent Labs   Lab Test 08/05/20  0947   A1C 5.6

## 2021-05-12 NOTE — PROGRESS NOTES
"SUBJECTIVE:   Iam Kaur is a 76 year old male who presents for Preventive Visit.    Patient has been advised of split billing requirements and indicates understanding: Yes     Are you in the first 12 months of your Medicare coverage?  No    Healthy Habits:     In general, how would you rate your overall health?  Very good    Frequency of exercise:  6-7 days/week    Duration of exercise:  Other    Do you usually eat at least 4 servings of fruit and vegetables a day, include whole grains    & fiber and avoid regularly eating high fat or \"junk\" foods?  No    Taking medications regularly:  Yes    Barriers to taking medications:  None    Medication side effects:  None    Ability to successfully perform activities of daily living:  No assistance needed    Home Safety:  No safety concerns identified    Hearing Impairment:  No hearing concerns    In the past 6 months, have you been bothered by leaking of urine?  No    In general, how would you rate your overall mental or emotional health?  Excellent      PHQ-2 Total Score: 1    Additional concerns today:  No    He continues to experience 8-10 bowel movements per day and this is chronic for him, improved with Imodium. He has seen GI in the past with a stable colonoscopy in 2018. He has radiation proctitis from previous rectal surgery in 2000.    He has a history of left shoulder arthritis and it is becoming more stiff and painful. He has had a shoulder injection in the past and is looking to have this done again.    He has not been using his CPAP the past few months as his supplies are wearing out. He was told he needed an updated visit in order to get new supplies.     Do you feel safe in your environment? Yes    Have you ever done Advance Care Planning? (For example, a Health Directive, POLST, or a discussion with a medical provider or your loved ones about your wishes): Yes, advance care planning is on file.       Fall risk  Fallen 2 or more times in the past " year?: (P) No  Any fall with injury in the past year?: (P) No    Cognitive Screening   1) Repeat 3 items (Leader, Season, Table)    2) Clock draw: NORMAL  3) 3 item recall: Recalls 1 object   Results: NORMAL clock, 1-2 items recalled: COGNITIVE IMPAIRMENT LESS LIKELY    Mini-CogTM Copyright BALBIR Carpenter. Licensed by the author for use in [facility]; reprinted with permission (bry@[facility]). All rights reserved.        Reviewed and updated as needed this visit by clinical staff  Tobacco  Allergies  Meds   Med Hx  Surg Hx  Fam Hx  Soc Hx      Reviewed and updated as needed this visit by Provider  Tobacco  Allergies  Meds  Problems  Med Hx  Surg Hx  Fam Hx      Social History     Tobacco Use     Smoking status: Never Smoker     Smokeless tobacco: Never Used     Tobacco comment: No smokers in home   Substance Use Topics     Alcohol use: Yes     Comment: 21 drinks a week     Alcohol Use 5/8/2019   Prescreen: >3 drinks/day or >7 drinks/week? Yes   Prescreen: >3 drinks/day or >7 drinks/week? -   AUDIT SCORE  6     Current providers sharing in care for this patient include:   Patient Care Team:  Barak Cameron PA-C as PCP - General (Physician Assistant)  Barak Cameron PA-C as Assigned PCP  Laly Poon, RN as Diabetes Educator (Diabetes Education)    The following health maintenance items are reviewed in Epic and correct as of today:  Health Maintenance Due   Topic Date Due     EYE EXAM  Never done     COVID-19 Vaccine (1) Never done     HEPATITIS C SCREENING  Never done     ZOSTER IMMUNIZATION (2 of 3) 12/27/2012       Review of Systems  GENERAL: Denies fever, fatigue, weakness, weight gain, or weight loss.  HEENT: Eyes-Denies pain, redness, loss of vision, double or blurred vision.     Ears/Nose-Denies tinnitus, loss of hearing, epistaxis, decreased sense of smell, nasal congestion. Denies loss of sense of taste, dry mouth, or sore throat.   CARDIOVASCULAR: Denies chest pain,  "shortness of breath, irregular heartbeats,  palpitations, or edema.  RESPIRATORY: Denies cough, hemoptysis, and shortness of breath.  GASTROINTESTINAL: +Frequent bowel movements. Denies nausea, vomiting, change in appetite, abdominal pain, or constipation.  GENITOURINARY: Denies increased frequency, urgency, dysuria, hematuria, or incontinence.   MUSCULOSKELETAL: Denies myalgias, arthralgias, or muscle weakness.  SKIN: Denies easy bruising, redness, rash, or dry skin.   NEUROLOGIC: Denies headache, fainting, dizziness, memory loss, numbness, tingling, or seizures.  PSYCHIATRIC: Denies depression, anxiety, mood swings, and thoughts of suicide.  ENDOCRINE: Denies heat and cold intolerance, polydipsia, or polyuria.   HEMATOLOGIC/LYMPHATIC: Denies easy bleeding or lymphadenopathy.   ALLERGIC/IMMUNOLOGIC: Denies rhinitis, asthma, skin sensitivity.       OBJECTIVE:   /80   Pulse 80   Temp 97.9  F (36.6  C) (Temporal)   Resp 16   Ht 1.68 m (5' 6.14\")   Wt 99.8 kg (220 lb)   SpO2 97%   BMI 35.36 kg/m   Estimated body mass index is 35.36 kg/m  as calculated from the following:    Height as of this encounter: 1.68 m (5' 6.14\").    Weight as of this encounter: 99.8 kg (220 lb).  Physical Exam  GENERAL: healthy, alert and no distress  EYES: Eyes grossly normal to inspection, PERRL and conjunctivae and sclerae normal  HENT: ear canals and TM's normal, nose and mouth without ulcers or lesions  NECK: no adenopathy, no asymmetry, masses, or scars and thyroid normal to palpation  RESP: lungs clear to auscultation - no rales, rhonchi or wheezes  CV: regular rate and rhythm, normal S1 S2, no S3 or S4, no murmur, click or rub, no peripheral edema and peripheral pulses strong  ABDOMEN: soft, nontender, no hepatosplenomegaly, no masses and bowel sounds normal   (male): normal male circumcised genitalia without lesions or urethral discharge, no hernia  MS: Decreased left shoulder abduction with tenderness throughout the " anterior shoulder. No spinal tenderness.   SKIN: no suspicious lesions or rashes  NEURO: Normal strength and tone, mentation intact and speech normal. Cranial nerves II-XII are grossly intact. DTRs are 2+/4 throughout and symmetric. Gait is stable.  PSYCH: mentation appears normal, affect normal/bright    Lab Results   Component Value Date    A1C 5.5 05/17/2021    A1C 5.6 08/05/2020    A1C 7.1 05/08/2019    A1C 5.8 12/01/2017    A1C 6.1 07/17/2015       ASSESSMENT / PLAN:       ICD-10-CM    1. Routine general medical examination at a health care facility  Z00.00    2. Type 2 diabetes mellitus without complication, without long-term current use of insulin (H)  E11.9 HEMOGLOBIN A1C     FOOT EXAM     HEMOGLOBIN A1C   3. Essential hypertension with goal blood pressure less than 140/90  I10 lisinopril (ZESTRIL) 10 MG tablet   4. Hyperlipidemia LDL goal <100  E78.5 atorvastatin (LIPITOR) 10 MG tablet   5. Frequent bowel movements  R19.4    6. Radiation proctitis  K62.7    7. Malignant neoplasm of rectum (H)  C20    8. Primary osteoarthritis of left shoulder  M19.012 Orthopedic & Spine  Referral   9. Obstructive sleep apnea syndrome  G47.33 SLEEP EVALUATION & MANAGEMENT REFERRAL - [facility] [phone] (Age 13 and up if over 100 lbs)   10. Morbid obesity (H)  E66.01        2, 10. His hemoglobin A1c is stable at 5.5. will continue current dose of metformin and he will continue with a low carb diet and active lifestyle. Will plan on recheck in 6 months.    3. Stable, continue lisinopril.    4. Continue Lipitor.    5-7. He is 20+ years out from rectal cancer surgery and radiation. He still has frequent, chronic stools and uses Imodium throughout the day which helps. Colonoscopy in 2018 was stable. Will recheck colonoscopy in the Fall.    8. History of severe left shoulder arthritis with decreased range of motion on exam. He would like to try another shoulder injection as this worked  "well years ago. Order placed for orthopedics.    9. He is due for an updated sleep study to make sure his CPAP setting have not changed and he needs new supplies.    Follow up in 6 months for a recheck.    Patient has been advised of split billing requirements and indicates understanding: Yes  COUNSELING:  Reviewed preventive health counseling, as reflected in patient instructions       Regular exercise       Healthy diet/nutrition    Estimated body mass index is 35.36 kg/m  as calculated from the following:    Height as of this encounter: 1.68 m (5' 6.14\").    Weight as of this encounter: 99.8 kg (220 lb).    Weight management plan: Discussed healthy diet and exercise guidelines    He reports that he has never smoked. He has never used smokeless tobacco.      Appropriate preventive services were discussed with this patient, including applicable screening as appropriate for cardiovascular disease, diabetes, osteopenia/osteoporosis, and glaucoma.  As appropriate for age/gender, discussed screening for colorectal cancer, prostate cancer, breast cancer, and cervical cancer. Checklist reviewing preventive services available has been given to the patient.    Reviewed patients plan of care and provided an AVS. The Basic Care Plan (routine screening as documented in Health Maintenance) for Iam meets the Care Plan requirement. This Care Plan has been established and reviewed with the Patient.    Counseling Resources:  ATP IV Guidelines  Pooled Cohorts Equation Calculator  Breast Cancer Risk Calculator  Breast Cancer: Medication to Reduce Risk  FRAX Risk Assessment  ICSI Preventive Guidelines  Dietary Guidelines for Americans, 2010  USDA's MyPlate  ASA Prophylaxis  Lung CA Screening    [facility]    Identified Health Risks:  "

## 2021-05-13 NOTE — TELEPHONE ENCOUNTER
Next 5 appointments (look out 90 days)    May 17, 2021  7:30 AM  PHYSICAL with Barak Cameron PA-C  [facility] ([facility] [address] [place] [id number]  [phone]        Tri Martins MA

## 2021-05-17 ENCOUNTER — OFFICE VISIT (OUTPATIENT)
Dept: FAMILY MEDICINE | Facility: OTHER | Age: 76
End: 2021-05-17
Payer: COMMERCIAL

## 2021-05-17 VITALS
SYSTOLIC BLOOD PRESSURE: 120 MMHG | WEIGHT: 220 LBS | OXYGEN SATURATION: 97 % | BODY MASS INDEX: 35.36 KG/M2 | TEMPERATURE: 97.9 F | HEART RATE: 80 BPM | RESPIRATION RATE: 16 BRPM | DIASTOLIC BLOOD PRESSURE: 80 MMHG | HEIGHT: 66 IN

## 2021-05-17 DIAGNOSIS — Z00.00 ROUTINE GENERAL MEDICAL EXAMINATION AT A HEALTH CARE FACILITY: Primary | ICD-10-CM

## 2021-05-17 DIAGNOSIS — E11.9 TYPE 2 DIABETES MELLITUS WITHOUT COMPLICATION, WITHOUT LONG-TERM CURRENT USE OF INSULIN (H): ICD-10-CM

## 2021-05-17 DIAGNOSIS — E78.5 HYPERLIPIDEMIA LDL GOAL <100: ICD-10-CM

## 2021-05-17 DIAGNOSIS — I10 ESSENTIAL HYPERTENSION WITH GOAL BLOOD PRESSURE LESS THAN 140/90: ICD-10-CM

## 2021-05-17 DIAGNOSIS — M19.012 PRIMARY OSTEOARTHRITIS OF LEFT SHOULDER: ICD-10-CM

## 2021-05-17 DIAGNOSIS — G47.33 OBSTRUCTIVE SLEEP APNEA SYNDROME: ICD-10-CM

## 2021-05-17 DIAGNOSIS — R19.4 FREQUENT BOWEL MOVEMENTS: ICD-10-CM

## 2021-05-17 DIAGNOSIS — K62.7 RADIATION PROCTITIS: ICD-10-CM

## 2021-05-17 DIAGNOSIS — E66.01 MORBID OBESITY (H): ICD-10-CM

## 2021-05-17 DIAGNOSIS — C20 MALIGNANT NEOPLASM OF RECTUM (H): ICD-10-CM

## 2021-05-17 LAB — HBA1C MFR BLD: 5.5 % (ref 0–5.6)

## 2021-05-17 PROCEDURE — 99207 PR FOOT EXAM NO CHARGE: CPT | Mod: 25 | Performed by: PHYSICIAN ASSISTANT

## 2021-05-17 PROCEDURE — 36415 COLL VENOUS BLD VENIPUNCTURE: CPT | Performed by: PHYSICIAN ASSISTANT

## 2021-05-17 PROCEDURE — 83036 HEMOGLOBIN GLYCOSYLATED A1C: CPT | Performed by: PHYSICIAN ASSISTANT

## 2021-05-17 PROCEDURE — 99397 PER PM REEVAL EST PAT 65+ YR: CPT | Performed by: PHYSICIAN ASSISTANT

## 2021-05-17 PROCEDURE — 99213 OFFICE O/P EST LOW 20 MIN: CPT | Mod: 25 | Performed by: PHYSICIAN ASSISTANT

## 2021-05-17 RX ORDER — ATORVASTATIN CALCIUM 10 MG/1
10 TABLET, FILM COATED ORAL DAILY
Qty: 90 TABLET | Refills: 3 | Status: SHIPPED | OUTPATIENT
Start: 2021-05-17 | End: 2022-07-13

## 2021-05-17 RX ORDER — LISINOPRIL 10 MG/1
10 TABLET ORAL DAILY
Qty: 90 TABLET | Refills: 3 | Status: SHIPPED | OUTPATIENT
Start: 2021-05-17 | End: 2022-07-13

## 2021-05-17 ASSESSMENT — ACTIVITIES OF DAILY LIVING (ADL): CURRENT_FUNCTION: NO ASSISTANCE NEEDED

## 2021-05-17 ASSESSMENT — MIFFLIN-ST. JEOR: SCORE: 1667.91

## 2021-06-04 ENCOUNTER — OFFICE VISIT (OUTPATIENT)
Dept: ORTHOPEDICS | Facility: OTHER | Age: 76
End: 2021-06-04
Attending: PHYSICIAN ASSISTANT
Payer: COMMERCIAL

## 2021-06-04 ENCOUNTER — ANCILLARY PROCEDURE (OUTPATIENT)
Dept: GENERAL RADIOLOGY | Facility: OTHER | Age: 76
End: 2021-06-04
Attending: PHYSICIAN ASSISTANT
Payer: COMMERCIAL

## 2021-06-04 VITALS
HEIGHT: 66 IN | WEIGHT: 222 LBS | DIASTOLIC BLOOD PRESSURE: 78 MMHG | BODY MASS INDEX: 35.68 KG/M2 | SYSTOLIC BLOOD PRESSURE: 132 MMHG

## 2021-06-04 DIAGNOSIS — M19.012 PRIMARY OSTEOARTHRITIS OF LEFT SHOULDER: ICD-10-CM

## 2021-06-04 DIAGNOSIS — M19.012 ARTHROPATHY OF LEFT SHOULDER: Primary | ICD-10-CM

## 2021-06-04 PROCEDURE — 73030 X-RAY EXAM OF SHOULDER: CPT | Mod: LT | Performed by: RADIOLOGY

## 2021-06-04 PROCEDURE — 20610 DRAIN/INJ JOINT/BURSA W/O US: CPT | Mod: LT | Performed by: PHYSICIAN ASSISTANT

## 2021-06-04 PROCEDURE — 99203 OFFICE O/P NEW LOW 30 MIN: CPT | Mod: 25 | Performed by: PHYSICIAN ASSISTANT

## 2021-06-04 RX ORDER — BUPIVACAINE HYDROCHLORIDE 5 MG/ML
3 INJECTION, SOLUTION EPIDURAL; INTRACAUDAL
Status: SHIPPED | OUTPATIENT
Start: 2021-06-04

## 2021-06-04 RX ORDER — TRIAMCINOLONE ACETONIDE 40 MG/ML
80 INJECTION, SUSPENSION INTRA-ARTICULAR; INTRAMUSCULAR
Status: SHIPPED | OUTPATIENT
Start: 2021-06-04

## 2021-06-04 RX ADMIN — BUPIVACAINE HYDROCHLORIDE 3 ML: 5 INJECTION, SOLUTION EPIDURAL; INTRACAUDAL at 14:11

## 2021-06-04 RX ADMIN — TRIAMCINOLONE ACETONIDE 80 MG: 40 INJECTION, SUSPENSION INTRA-ARTICULAR; INTRAMUSCULAR at 14:11

## 2021-06-04 ASSESSMENT — MIFFLIN-ST. JEOR: SCORE: 1678.71

## 2021-06-04 NOTE — LETTER
6/4/2021         RE: Iam Kaur  [address] [facility] [id number]        Dear Colleague,    Thank you for referring your patient, Iam Kaur, to the [facility] [place]. Please see a copy of my visit note below.    ORTHOPEDIC CONSULT      Chief Complaint: Iam Kaur is a 76 year old male who is left-hand dominant and used to play football when he was younger was golf and fishing.    He is being seen for   Chief Complaints and History of Present Illnesses   Patient presents with     Left Shoulder - Pain         History of Present Illness:   Mechanism of Injury: No injury fall or trauma.  Location: Left shoulder  Duration of Pain: 3-4 years  Rating of Pain: Moderate  Pain Quality: Achy and stiff  Pain is better with: Rest  Pain is worse with: Heavy movement  Treatment so far consists of: Ibuprofen 400 mg which does help, Tylenol helps slightly.  No other treatments such as therapy ice or heat or steroid injections.   Associated Features: Patient denies any numbness or tingling or radicular type pain.  Patient denies any major injury.  Prior history of related problems: Patient did have bilateral shoulder problems in the past and about 40 years ago he had surgery on both shoulders by Dr. Contreras.  Patient states that the time both his right and left shoulder were dislocating and he thought he might of had a rotator cuff tear.  Pain is Limiting: Use of left shoulder above chest level.  Here to: Orthopedic consultation  The Pain Has: Gotten worse  Additional History: Patient has limited range of motion.  Patient still can, date into activity below shoulder level but has difficulty getting his arm above shoulder level and his shoulder is stiff.      Patient's past medical, surgical, social and family histories reviewed.     Past Medical History:   Diagnosis Date     Chest pain, unspecified 06/22/1999    Atypical chest pain, felt to be stress related,  negative GXT     Closed dislocation of acromioclavicular (joint) 1962 & 1990    Chronic dislocations of shoulder - two surgeries with good results     Irritable bowel syndrome      Malignant neoplasm of colon, unspecified site 2000    Colon cancer/ rectal, Dr. Tee     Pneumonia, organism unspecified(486) 1977    Hospitalized 3 to 4 days     Sleep apnea 11/09    severe, recommended CPAP        Past Surgical History:   Procedure Laterality Date     C EXCIS RECTAL LESION,TRANSANAL  05/09/2000    Transanal excision of rectal polyp, tubulovillous adenoma with severe dysplasia     COLONOSCOPY  03/05/07    repeat in 3 yrs     COLONOSCOPY  06/09/10    repeat in 3 yrs     COLONOSCOPY  6/11/13    colonoscopy, polypectomy, recheck in 3 years     COLONOSCOPY N/A 9/14/2018    Procedure: COMBINED COLONOSCOPY, SINGLE OR MULTIPLE BIOPSY/POLYPECTOMY BY BIOPSY;;  Surgeon: Rosendo Delgado MD;  Location: [facility] OR     COLONOSCOPY WITH CO2 INSUFFLATION N/A 9/14/2018    Procedure: COLONOSCOPY WITH CO2 INSUFFLATION;  Colonoscopy  Loose stools  BMI 37.17  Pharmacy: [facility] - Fax [phone]  Referred by Dr. Delgado;  Surgeon: Rosendo Delgado MD;  Location: [facility] OR     ESOPHAGOSCOPY, GASTROSCOPY, DUODENOSCOPY (EGD), COMBINED  11/30/2010    COMBINED ESOPHAGOSCOPY, GASTROSCOPY, DUODENOSCOPY (EGD), BIOPSY SINGLE OR MULTIPLE performed by SHAGUFTA ROLDAN at  GI      COLONOSCOPY THRU STOMA, DIAGNOSTIC  2001     HC REMOVAL OF TONSILS,<10 Y/O      Unsure of age at time of surgery     HC REPAIR ROTATOR CUFF,ACUTE  1962, 1966    Bilateral shoulder surgery for chronic dislocation     INJECT EPIDURAL LUMBAR  10/14/2014    [facility]     REMOVAL OF SPERM DUCT(S)  1975    Vasectomy       Medications:  alcohol swab prep pads, Use to swab area of injection/shazia as directed.  ASPIRIN 81 MG OR TABS, ONE DAILY  atorvastatin (LIPITOR) 10 MG tablet, Take 1 tablet (10 mg) by mouth daily  blood glucose (NO  BRAND SPECIFIED) test strip, Use to test blood sugar 3 times daily or as directed. To accompany: Blood Glucose Monitor Brands: per insurance.  blood glucose calibration (NO BRAND SPECIFIED) solution, To accompany: Blood Glucose Monitor Brands: per insurance.  blood glucose monitoring (NO BRAND SPECIFIED) meter device kit, Use to test blood sugar 3 times daily or as directed. Blood Glucose Monitor Brands: per insurance.  diphenoxylate-atropine (LOMOTIL) 2.5-0.025 MG tablet, Take 2 tablets by mouth 4 times daily as needed for diarrhea  fish oil-omega-3 fatty acids 1000 MG capsule, Take 2 g by mouth daily  lisinopril (ZESTRIL) 10 MG tablet, Take 1 tablet (10 mg) by mouth daily  metFORMIN (GLUCOPHAGE) 500 MG tablet, Take 1 tablet (500 mg) by mouth 2 times daily (with meals)  multivitamin, therapeutic with minerals (MULTI-VITAMIN) TABS, Take 1 tablet by mouth daily  psyllium (METAMUCIL) 58.6 % POWD, Take by mouth daily Gummy fiber  thin (NO BRAND SPECIFIED) lancets, Use to check glucose 3 times daily. To accompany: Blood Glucose Monitor Brands: per insurance.  traMADol (ULTRAM) 50 MG tablet, Take 2 tablets (100 mg) by mouth every 6 hours as needed for severe pain    No current facility-administered medications on file prior to visit.       Allergies   Allergen Reactions     Naproxen Itching       Social History     Occupational History     Occupation:      Comment: [employer]   Tobacco Use     Smoking status: Never Smoker     Smokeless tobacco: Never Used     Tobacco comment: No smokers in home   Substance and Sexual Activity     Alcohol use: Yes     Comment: 21 drinks a week     Drug use: No     Comment: caffeine 3-4 daily     Sexual activity: Yes     Partners: Female     Birth control/protection: Surgical     Comment: vasectomy       Family History   Problem Relation Age of Onset     Alzheimer Disease Father      Cerebrovascular Disease Father         x 3-4 episodes     Arthritis Mother          "RA     Breast Cancer Sister      Heart Disease Sister      Heart Disease Sister      Other Cancer Brother      Diabetes Paternal Grandmother      Heart Disease Maternal Grandmother      Heart Disease Maternal Grandfather      Hypertension Brother        REVIEW OF SYSTEMS  10 point review systems performed otherwise negative as noted as per history of present illness.    Physical Exam:  Vitals: /78   Ht 1.683 m (5' 6.25\")   Wt 100.7 kg (222 lb)   BMI 35.56 kg/m    BMI= Body mass index is 35.56 kg/m .    Constitutional: healthy, alert and no acute distress   Psychiatric: mentation appears normal and affect normal/bright  NEURO: no focal deficits, CMS intact left upper extremity   RESP: Normal with easy respirations and no use of accessory muscles to breathe, no audible wheezing or retractions  CV: +2 radial pulse and his hand is warm to palpation.   SKIN: No erythema, rashes, excoriation, or breakdown. No evidence of infection.   MUSCULOSKELETAL:    INSPECTION of left shoulder: No gross deformities, erythema, edema, ecchymosis, atrophy or fasciculations.     PALPATION: No tenderness AC joint, proximal biceps tendon, clavicle, lateral shoulder, posterior shoulder, trapezius area. No increased warmth noted.     ROM: Passive: Forward flexion to 110 degrees, abduction to 75 degrees, external rotation to 5 degrees, internal rotation to left hip.  All range of motion is without catching locking or pain except patient does have pain at maximal range of motion.     STRENGTH: 5 out of 5 , deltoid as well as +4 out of 5 internal and minus 4 out of 5 external rotation     SPECIAL TEST: Patient has a negative Neer test, negative Koenig sign, negative cross over test, positive bear hug test and positive liftoff test, positive empty can and full can test, negative external rotator lag sign, negative drop test   GAIT: non-antalgic  Lymph: no palpable lymph nodes    Diagnostic Modalities:  Left shoulder with severe " glenohumeral degenerative joint disease.  Patient has an osteophyte just inferior to the humeral head.  Patient also has hardware that does not appear to be loosened.  No other fracture dislocation or tumor.    Independent visualization of the images was performed.    Impression: 1.  Left shoulder arthropathy.  2.  Left shoulder severe degenerative joint disease glenohumeral joint.  3.  Left shoulder rotator cuff tear, chronic  4.  Approximately 40 years status post left shoulder surgery    Plan:  All of the above pertinent physical exam and imaging modalities findings was reviewed with Iam.    FOCUSED PLAN:  Patient with a very stiff shoulder and with examination I do believe his rotator cuff is not intact.  This helped us make the decision to do a clinic steroid injection if his rotator cuff is intact I would have sent him to radiology to get an injection done with x-ray guidance.  We did talk about medication however he does not like to take medication otherwise he could try Mobic or Voltaren.  We did discuss physical therapy although he has a lot of arthritis maybe it could benefit him but he want to hold off on that.  We did discuss reverse total shoulder  arthroplasty and how this surgery could benefit him.  He understands.  Prior to proceeding we would get an MRI to confirm that the rotator cuff is torn.  Fortunately Dr. Hernandez does shoulder replacements.  Follow-up as needed with Dr. Hernandez.    Re-x-ray on return: No    BP Readings from Last 1 Encounters:   06/04/21 132/78       BP noted to be well controlled today in office.      Patient does not use Tobacco products.    This note was dictated with Propeller.    Carlos Rodriguez PA-C      Large Joint Injection/Arthocentesis: L subacromial bursa    Date/Time: 6/4/2021 2:11 PM  Performed by: Carlos Rodriguez PA-C  Authorized by: Carlos Rodriguez PA-C     Indications:  Pain  Needle Size:  22 G  Guidance: landmark guided    Approach:  Posterolateral  Location:   Shoulder      Site:  L subacromial bursa  Medications:  80 mg triamcinolone 40 MG/ML; 3 mL bupivacaine (PF) 0.5 %  Aspirate amount (mL):  0  Procedure discussed: discussed risks, benefits, and alternatives    Consent Given by:  Patient  Timeout: timeout called immediately prior to procedure    Prep: patient was prepped and draped in usual sterile fashion     The skin was prepped with betadine. The patient was in a seated position. I used long chloride spray prior to doing the injection.  The patient tolerated the injection well, and there were no complications. The injection site was covered with a Band-Aid.             Again, thank you for allowing me to participate in the care of your patient.        Sincerely,        Carlos Rodriguez PA-C

## 2021-06-04 NOTE — PROGRESS NOTES
ORTHOPEDIC CONSULT      Chief Complaint: Iam Kaur is a 76 year old male who is left-hand dominant and used to play football when he was younger was golf and fishing.    He is being seen for   Chief Complaints and History of Present Illnesses   Patient presents with     Left Shoulder - Pain         History of Present Illness:   Mechanism of Injury: No injury fall or trauma.  Location: Left shoulder  Duration of Pain: 3-4 years  Rating of Pain: Moderate  Pain Quality: Achy and stiff  Pain is better with: Rest  Pain is worse with: Heavy movement  Treatment so far consists of: Ibuprofen 400 mg which does help, Tylenol helps slightly.  No other treatments such as therapy ice or heat or steroid injections.   Associated Features: Patient denies any numbness or tingling or radicular type pain.  Patient denies any major injury.  Prior history of related problems: Patient did have bilateral shoulder problems in the past and about 40 years ago he had surgery on both shoulders by Dr. Contreras.  Patient states that the time both his right and left shoulder were dislocating and he thought he might of had a rotator cuff tear.  Pain is Limiting: Use of left shoulder above chest level.  Here to: Orthopedic consultation  The Pain Has: Gotten worse  Additional History: Patient has limited range of motion.  Patient still can, date into activity below shoulder level but has difficulty getting his arm above shoulder level and his shoulder is stiff.      Patient's past medical, surgical, social and family histories reviewed.     Past Medical History:   Diagnosis Date     Chest pain, unspecified 06/22/1999    Atypical chest pain, felt to be stress related, negative GXT     Closed dislocation of acromioclavicular (joint) 1962 & 1990    Chronic dislocations of shoulder - two surgeries with good results     Irritable bowel syndrome      Malignant neoplasm of colon, unspecified site 2000    Colon cancer/ rectal, Dr. Tee      Pneumonia, organism unspecified(486) 1977    Hospitalized 3 to 4 days     Sleep apnea 11/09    severe, recommended CPAP        Past Surgical History:   Procedure Laterality Date     C EXCIS RECTAL LESION,TRANSANAL  05/09/2000    Transanal excision of rectal polyp, tubulovillous adenoma with severe dysplasia     COLONOSCOPY  03/05/07    repeat in 3 yrs     COLONOSCOPY  06/09/10    repeat in 3 yrs     COLONOSCOPY  6/11/13    colonoscopy, polypectomy, recheck in 3 years     COLONOSCOPY N/A 9/14/2018    Procedure: COMBINED COLONOSCOPY, SINGLE OR MULTIPLE BIOPSY/POLYPECTOMY BY BIOPSY;;  Surgeon: Rosendo Delgado MD;  Location: [facility] OR     COLONOSCOPY WITH CO2 INSUFFLATION N/A 9/14/2018    Procedure: COLONOSCOPY WITH CO2 INSUFFLATION;  Colonoscopy  Loose stools  BMI 37.17  Pharmacy: [facility] - Fax [phone]  Referred by Dr. Delgado;  Surgeon: Rosendo Delgado MD;  Location: [facility] OR     ESOPHAGOSCOPY, GASTROSCOPY, DUODENOSCOPY (EGD), COMBINED  11/30/2010    COMBINED ESOPHAGOSCOPY, GASTROSCOPY, DUODENOSCOPY (EGD), BIOPSY SINGLE OR MULTIPLE performed by SHAGUFTA ROLDAN at  [facility]     HC COLONOSCOPY THRU STOMA, DIAGNOSTIC  2001     HC REMOVAL OF TONSILS,<12 Y/O      Unsure of age at time of surgery     HC REPAIR ROTATOR CUFF,ACUTE  1962, 1966    Bilateral shoulder surgery for chronic dislocation     INJECT EPIDURAL LUMBAR  10/14/2014    [facility]     REMOVAL OF SPERM DUCT(S)  1975    Vasectomy       Medications:  alcohol swab prep pads, Use to swab area of injection/shazia as directed.  ASPIRIN 81 MG OR TABS, ONE DAILY  atorvastatin (LIPITOR) 10 MG tablet, Take 1 tablet (10 mg) by mouth daily  blood glucose (NO BRAND SPECIFIED) test strip, Use to test blood sugar 3 times daily or as directed. To accompany: Blood Glucose Monitor Brands: per insurance.  blood glucose calibration (NO BRAND SPECIFIED) solution, To accompany: Blood Glucose Monitor Brands: per insurance.  blood glucose  monitoring (NO BRAND SPECIFIED) meter device kit, Use to test blood sugar 3 times daily or as directed. Blood Glucose Monitor Brands: per insurance.  diphenoxylate-atropine (LOMOTIL) 2.5-0.025 MG tablet, Take 2 tablets by mouth 4 times daily as needed for diarrhea  fish oil-omega-3 fatty acids 1000 MG capsule, Take 2 g by mouth daily  lisinopril (ZESTRIL) 10 MG tablet, Take 1 tablet (10 mg) by mouth daily  metFORMIN (GLUCOPHAGE) 500 MG tablet, Take 1 tablet (500 mg) by mouth 2 times daily (with meals)  multivitamin, therapeutic with minerals (MULTI-VITAMIN) TABS, Take 1 tablet by mouth daily  psyllium (METAMUCIL) 58.6 % POWD, Take by mouth daily Gummy fiber  thin (NO BRAND SPECIFIED) lancets, Use to check glucose 3 times daily. To accompany: Blood Glucose Monitor Brands: per insurance.  traMADol (ULTRAM) 50 MG tablet, Take 2 tablets (100 mg) by mouth every 6 hours as needed for severe pain    No current facility-administered medications on file prior to visit.       Allergies   Allergen Reactions     Naproxen Itching       Social History     Occupational History     Occupation:      Comment: [employer]   Tobacco Use     Smoking status: Never Smoker     Smokeless tobacco: Never Used     Tobacco comment: No smokers in home   Substance and Sexual Activity     Alcohol use: Yes     Comment: 21 drinks a week     Drug use: No     Comment: caffeine 3-4 daily     Sexual activity: Yes     Partners: Female     Birth control/protection: Surgical     Comment: vasectomy       Family History   Problem Relation Age of Onset     Alzheimer Disease Father      Cerebrovascular Disease Father         x 3-4 episodes     Arthritis Mother         RA     Breast Cancer Sister      Heart Disease Sister      Heart Disease Sister      Other Cancer Brother      Diabetes Paternal Grandmother      Heart Disease Maternal Grandmother      Heart Disease Maternal Grandfather      Hypertension Brother        REVIEW OF  "SYSTEMS  10 point review systems performed otherwise negative as noted as per history of present illness.    Physical Exam:  Vitals: /78   Ht 1.683 m (5' 6.25\")   Wt 100.7 kg (222 lb)   BMI 35.56 kg/m    BMI= Body mass index is 35.56 kg/m .    Constitutional: healthy, alert and no acute distress   Psychiatric: mentation appears normal and affect normal/bright  NEURO: no focal deficits, CMS intact left upper extremity   RESP: Normal with easy respirations and no use of accessory muscles to breathe, no audible wheezing or retractions  CV: +2 radial pulse and his hand is warm to palpation.   SKIN: No erythema, rashes, excoriation, or breakdown. No evidence of infection.   MUSCULOSKELETAL:    INSPECTION of left shoulder: No gross deformities, erythema, edema, ecchymosis, atrophy or fasciculations.     PALPATION: No tenderness AC joint, proximal biceps tendon, clavicle, lateral shoulder, posterior shoulder, trapezius area. No increased warmth noted.     ROM: Passive: Forward flexion to 110 degrees, abduction to 75 degrees, external rotation to 5 degrees, internal rotation to left hip.  All range of motion is without catching locking or pain except patient does have pain at maximal range of motion.     STRENGTH: 5 out of 5 , deltoid as well as +4 out of 5 internal and minus 4 out of 5 external rotation     SPECIAL TEST: Patient has a negative Neer test, negative Koenig sign, negative cross over test, positive bear hug test and positive liftoff test, positive empty can and full can test, negative external rotator lag sign, negative drop test   GAIT: non-antalgic  Lymph: no palpable lymph nodes    Diagnostic Modalities:  Left shoulder with severe glenohumeral degenerative joint disease.  Patient has an osteophyte just inferior to the humeral head.  Patient also has hardware that does not appear to be loosened.  No other fracture dislocation or tumor.    Independent visualization of the images was " performed.    Impression: 1.  Left shoulder arthropathy.  2.  Left shoulder severe degenerative joint disease glenohumeral joint.  3.  Left shoulder rotator cuff tear, chronic  4.  Approximately 40 years status post left shoulder surgery    Plan:  All of the above pertinent physical exam and imaging modalities findings was reviewed with Iam.    FOCUSED PLAN:  Patient with a very stiff shoulder and with examination I do believe his rotator cuff is not intact.  This helped us make the decision to do a clinic steroid injection if his rotator cuff is intact I would have sent him to radiology to get an injection done with x-ray guidance.  We did talk about medication however he does not like to take medication otherwise he could try Mobic or Voltaren.  We did discuss physical therapy although he has a lot of arthritis maybe it could benefit him but he want to hold off on that.  We did discuss reverse total shoulder  arthroplasty and how this surgery could benefit him.  He understands.  Prior to proceeding we would get an MRI to confirm that the rotator cuff is torn.  Fortunately Dr. Hernandez does shoulder replacements.  Follow-up as needed with Dr. Hernandez.    Re-x-ray on return: No    BP Readings from Last 1 Encounters:   06/04/21 132/78       BP noted to be well controlled today in office.      Patient does not use Tobacco products.    This note was dictated with Skaffl.    Carlos Rodriguez PA-C      Large Joint Injection/Arthocentesis: L subacromial bursa    Date/Time: 6/4/2021 2:11 PM  Performed by: Carlos Rodriguez PA-C  Authorized by: Carlos Rodriguez PA-C     Indications:  Pain  Needle Size:  22 G  Guidance: landmark guided    Approach:  Posterolateral  Location:  Shoulder      Site:  L subacromial bursa  Medications:  80 mg triamcinolone 40 MG/ML; 3 mL bupivacaine (PF) 0.5 %  Aspirate amount (mL):  0  Procedure discussed: discussed risks, benefits, and alternatives    Consent Given by:  Patient  Timeout: timeout  called immediately prior to procedure    Prep: patient was prepped and draped in usual sterile fashion     The skin was prepped with betadine. The patient was in a seated position. I used long chloride spray prior to doing the injection.  The patient tolerated the injection well, and there were no complications. The injection site was covered with a Band-Aid.

## 2021-06-07 ENCOUNTER — OFFICE VISIT (OUTPATIENT)
Dept: FAMILY MEDICINE | Facility: OTHER | Age: 76
End: 2021-06-07
Payer: COMMERCIAL

## 2021-06-07 VITALS
DIASTOLIC BLOOD PRESSURE: 70 MMHG | RESPIRATION RATE: 18 BRPM | WEIGHT: 220 LBS | SYSTOLIC BLOOD PRESSURE: 138 MMHG | HEART RATE: 78 BPM | OXYGEN SATURATION: 98 % | TEMPERATURE: 97.2 F | HEIGHT: 66 IN | BODY MASS INDEX: 35.36 KG/M2

## 2021-06-07 DIAGNOSIS — H61.21 IMPACTED CERUMEN OF RIGHT EAR: ICD-10-CM

## 2021-06-07 DIAGNOSIS — H93.13 RINGING IN EAR, BILATERAL: Primary | ICD-10-CM

## 2021-06-07 PROCEDURE — 99214 OFFICE O/P EST MOD 30 MIN: CPT | Performed by: PHYSICIAN ASSISTANT

## 2021-06-07 ASSESSMENT — MIFFLIN-ST. JEOR: SCORE: 1669.79

## 2021-06-07 ASSESSMENT — PAIN SCALES - GENERAL: PAINLEVEL: NO PAIN (0)

## 2021-06-07 NOTE — PROGRESS NOTES
Assessment & Plan     ICD-10-CM    1. Ringing in ear, bilateral  H93.13    2. Impacted cerumen of right ear  H61.21        1 & 2. Ringing in ear and Cerumen impaction of right ear  - Buzzing/silence around ears started 1 week ago, is most bothersome when he is not active and at bed time, goes away when he is active and busy to keep his mind off of noise, noise does not prevent him from participating in daily activities, tries to stay active playing golf frequently, drinking less than 1 plastic water bottle per day, drinking coffee in the mornings, added stress in past month with bills   no medication or dose changes, no recent fever, cough or other sick symptoms, no ear pain  - TMs with potentially slight retraction, no effusion or erythema  - Tympanic evaluation showed slight decrease in left TM mobility compared to right  - Right ear cleared of cerumen impaction, symptoms did not improve or worsen after  - Possible etiology dehydration (electrolyte imbalance) vs. Viral illness vs. Hearing etiology vs. Other     Since patient doesn't get proper hydration and symptoms have been only 1 week, will recommend hydration and monitoring  - Educated on proper hydration as well as relaxation to handle stress, especially with increased temp outside as he is playing golf frequently and addition of stress in recent month   - Follow up in 2-3 weeks if symptoms persist with above treatment plan, consider audiology evaluation of hearing    Review of the result(s) of each unique test - None   Diagnosis or treatment significantly limited by social determinants of health - None     30 minutes spent on the date of the encounter doing chart review, history and exam, documentation and further activities as noted above    The patient indicates understanding of these issues and agrees with the plan.    Return in about 4 weeks (around 7/5/2021), or if symptoms worsen or fail to improve.    ICharles PA-C,  was present  "with the PA student who participated in the service and in the documentation of the note.  I have verified the history and personally performed the physical exam and medical decision making.  I agree with the assessment and plan of care as documented in the note.     MARIIA Maynard-S2  [facility] Craft-C  [facility] JOSE M Colbert is a 76 year old who presents for the following health issues     HPI   Concern - Patient seems to have noises/sound/silence in head when sitting quietly  Onset: 1 week   Description:   Intensity: moderate  Progression of Symptoms:  same  Accompanying Signs & Symptoms: none  Previous history of similar problem: none  Precipitating factors:        Worsened by:   Alleviating factors:        Improved by: doing something   Therapies tried and outcome: None      - Cortisone shot in shoulder 3-4 days ago    No other med changes or dose changes   No recent illness or sick symptoms   - silence in brain, not one ear or the other, maybe a buzzing sound   Worse when sitting quietly, going to bed  - When staying busy doesn't think about it, forgets about it and doesn't notice it  - Been a stressful month   - try to stay active, golf frequently   - Not drinking much water, less than 1 plastic water bottle per day, coffee in the mornings          Review of Systems   Constitutional, HEENT, cardiovascular, pulmonary, gi and gu systems are negative, except as otherwise noted.      Objective    /70   Pulse 78   Temp 97.2  F (36.2  C) (Temporal)   Resp 18   Ht 1.683 m (5' 6.26\")   Wt 99.8 kg (220 lb)   SpO2 98%   BMI 35.23 kg/m    Body mass index is 35.23 kg/m .  Physical Exam   GENERAL APPEARANCE: healthy appearing, alert and no distress  EYES: Eyes grossly normal to inspection, PERRLA, conjunctivae and sclerae without injection or discharge, EOM intact   HENT: Right ear canal with cerumen, cleared impaction by nursing " staff, then bilateral TMs with dull light reflex, mild retraction, no erythema, effusion, or injection, nasal turbinates without swelling, erythema, or discharge, mouth without ulcers or lesions, oropharynx clear and oral mucous membranes moist, no sinus tenderness   NECK: No adenopathy in cervical or supraclavicular regions  RESP: Lungs clear to auscultation - no rales, rhonchi or wheezes   CV: Regular rates and rhythm, normal S1 S2, no S3 or S4, no murmur, click or rub  MS: No musculoskeletal defects are noted and gait is age appropriate without ataxia   SKIN: No suspicious lesions or rashes, hydration status appears adeuqate with normal skin turgor   PSYCH: Alert and oriented x3; speech- coherent , normal rate and volume; able to articulate logical thoughts, able to abstract reason, no tangential thoughts, no hallucinations or delusions, mentation appears normal, Mood is euthymic. Affect is appropriate for this mood state and bright. Thought content is free of suicidal ideation, hallucinations, and delusions. Dress is adequate and upkept. Eye contact is good during conversation.       Diagnostics: none

## 2021-06-08 ENCOUNTER — OFFICE VISIT (OUTPATIENT)
Dept: SLEEP MEDICINE | Facility: CLINIC | Age: 76
End: 2021-06-08
Payer: COMMERCIAL

## 2021-06-08 VITALS
WEIGHT: 217 LBS | BODY MASS INDEX: 34.87 KG/M2 | RESPIRATION RATE: 20 BRPM | DIASTOLIC BLOOD PRESSURE: 70 MMHG | SYSTOLIC BLOOD PRESSURE: 132 MMHG | HEIGHT: 66 IN | TEMPERATURE: 99 F

## 2021-06-08 DIAGNOSIS — G47.33 OBSTRUCTIVE SLEEP APNEA SYNDROME: Primary | ICD-10-CM

## 2021-06-08 PROCEDURE — 99212 OFFICE O/P EST SF 10 MIN: CPT | Performed by: PHYSICIAN ASSISTANT

## 2021-06-08 ASSESSMENT — MIFFLIN-ST. JEOR: SCORE: 1656.19

## 2021-06-08 NOTE — PROGRESS NOTES
Does Iam have a CPAP/Bipap?  Yes     (If yes please fill out below.  If no please delete links)        Type of mask: nasal pillows    [facility] [place] [phone]    [email or web address]    [place] Sleep Scale: 2        Obstructive Sleep Apnea - PAP Follow-Up Visit:    Chief Complaint   Patient presents with     Consult     CPAP follow up        Iam Kaur comes in today for follow-up of their severe sleep apnea, managed with CPAP.     No specialty comments available.    Overall, he rates the experience with PAP as 7 (0 poor, 10 great). The mask is comfortable.   The mask is not leaking .  He is not snoring with the mask on. He is not having gasp arousals.  He is not having significant oral/nasal dryness. The pressure is comfortable.    His PAP interface is Nasal Pillows.    Bedtime is typically 9. Usually it takes about 10 minutes to fall asleep with the mask on. Wake time is typically 5.  Patient is using PAP therapy 7-8 hours per night. The patient is usually getting 7 hours of sleep per night.    He does feel rested in the morning.    [place] Sleepiness Scale: 2/24      ANDREW Total Score: 13      ResMed       Auto-PAP 6 - 10 cmH2O 30 day usage data:    11% of days with > 4 hours of use. 19/30 days with no use.   Average use 6 hours 21 minutes per day.   95%ile Leak 7.1 L/min.   CPAP 95% pressure 9.9 cm.   AHI 0.8 events per hour.               Past medical/surgical history, family history, social history, medications and allergies were reviewed.      Problem List:  Patient Active Problem List    Diagnosis Date Noted     Type 2 diabetes mellitus without complication, without long-term current use of insulin (H) 05/20/2019     Priority: Medium     Radiation proctitis 05/08/2019     Priority: Medium     Primary osteoarthritis of both shoulders 05/08/2019     Priority: Medium     Morbid obesity (H) 08/07/2018     Priority: Medium     Alcohol use 11/30/2016  "    Priority: Medium     Lumbosacral neuritis 09/28/2013     Priority: Medium     L5/S1 with moderate foraminal stenosis on the left        Facet arthritis of lumbar region 09/28/2013     Priority: Medium     Spondylosis of lumbar joint 04/22/2013     Priority: Medium     Trochanteric bursitis 04/22/2013     Priority: Medium     Pain in shoulder 04/22/2013     Priority: Medium     HTN, goal below 140/90 02/27/2013     Priority: Medium     Advanced directives, counseling/discussion 10/31/2012     Priority: Medium     Discussed advance care planning with patient; information given to patient to review. 10/31/2012          Impaired fasting glucose 05/04/2011     Priority: Medium     Essential hypertension 05/04/2011     Priority: Medium     Obesity 05/04/2011     Priority: Medium     Hyperlipidemia LDL goal <130 05/04/2011     Priority: Medium     Sleep apnea      Priority: Medium     severe, recommended CPAP       Tear of medial cartilage or meniscus of knee, current 09/14/2009     Priority: Medium     Irritable bowel syndrome      Priority: Medium     Regular diarrhea       Malignant neoplasm of rectum (H) 08/07/2001     Priority: Medium        /70   Temp 99  F (37.2  C) (Temporal)   Resp 20   Ht 1.683 m (5' 6.26\")   Wt 98.4 kg (217 lb)   BMI 34.75 kg/m      Impression/Plan:    Severe Sleep apnea. He is Tolerating PAP well, apnea well controlled. Daytime symptoms are stable. New  Supplies ordered. Due to poorly fitting headgear he has not been wearing it much lately. This should improve with new supplies, and he is encouraged to wear it regularly.       Iam Kaur will follow up in about 1 year(s).     Fifteen minutes spent with patient, all of which were spent face-to-face counseling, consulting, coordinating plan of care.      CC:  Barak Cameron,   "

## 2021-06-21 ENCOUNTER — TRANSFERRED RECORDS (OUTPATIENT)
Dept: HEALTH INFORMATION MANAGEMENT | Facility: CLINIC | Age: 76
End: 2021-06-21

## 2021-06-21 LAB — RETINOPATHY: NORMAL

## 2021-08-28 ENCOUNTER — HEALTH MAINTENANCE LETTER (OUTPATIENT)
Age: 76
End: 2021-08-28

## 2021-10-20 ENCOUNTER — OFFICE VISIT (OUTPATIENT)
Dept: FAMILY MEDICINE | Facility: OTHER | Age: 76
End: 2021-10-20
Payer: COMMERCIAL

## 2021-10-20 ENCOUNTER — ANCILLARY PROCEDURE (OUTPATIENT)
Dept: GENERAL RADIOLOGY | Facility: OTHER | Age: 76
End: 2021-10-20
Attending: PHYSICIAN ASSISTANT
Payer: COMMERCIAL

## 2021-10-20 VITALS
HEIGHT: 66 IN | DIASTOLIC BLOOD PRESSURE: 72 MMHG | BODY MASS INDEX: 35.07 KG/M2 | SYSTOLIC BLOOD PRESSURE: 124 MMHG | OXYGEN SATURATION: 97 % | HEART RATE: 78 BPM | TEMPERATURE: 97.3 F | RESPIRATION RATE: 18 BRPM | WEIGHT: 218.2 LBS

## 2021-10-20 DIAGNOSIS — S89.91XA KNEE INJURY, RIGHT, INITIAL ENCOUNTER: Primary | ICD-10-CM

## 2021-10-20 DIAGNOSIS — E78.5 HYPERLIPIDEMIA LDL GOAL <100: ICD-10-CM

## 2021-10-20 DIAGNOSIS — S89.91XA KNEE INJURY, RIGHT, INITIAL ENCOUNTER: ICD-10-CM

## 2021-10-20 DIAGNOSIS — Z85.048 HISTORY OF RECTAL CANCER: ICD-10-CM

## 2021-10-20 DIAGNOSIS — E11.9 TYPE 2 DIABETES MELLITUS WITHOUT COMPLICATION, WITHOUT LONG-TERM CURRENT USE OF INSULIN (H): ICD-10-CM

## 2021-10-20 DIAGNOSIS — M25.561 ACUTE PAIN OF RIGHT KNEE: ICD-10-CM

## 2021-10-20 DIAGNOSIS — I10 ESSENTIAL HYPERTENSION WITH GOAL BLOOD PRESSURE LESS THAN 140/90: ICD-10-CM

## 2021-10-20 LAB
ANION GAP SERPL CALCULATED.3IONS-SCNC: 3 MMOL/L (ref 3–14)
BUN SERPL-MCNC: 19 MG/DL (ref 7–30)
CALCIUM SERPL-MCNC: 9 MG/DL (ref 8.5–10.1)
CHLORIDE BLD-SCNC: 108 MMOL/L (ref 94–109)
CHOLEST SERPL-MCNC: 168 MG/DL
CO2 SERPL-SCNC: 28 MMOL/L (ref 20–32)
CREAT SERPL-MCNC: 0.9 MG/DL (ref 0.66–1.25)
CREAT UR-MCNC: 131 MG/DL
FASTING STATUS PATIENT QL REPORTED: YES
GFR SERPL CREATININE-BSD FRML MDRD: 82 ML/MIN/1.73M2
GLUCOSE BLD-MCNC: 117 MG/DL (ref 70–99)
HBA1C MFR BLD: 5.4 % (ref 0–5.6)
HDLC SERPL-MCNC: 49 MG/DL
LDLC SERPL CALC-MCNC: 96 MG/DL
MICROALBUMIN UR-MCNC: 8 MG/L
MICROALBUMIN/CREAT UR: 6.11 MG/G CR (ref 0–17)
NONHDLC SERPL-MCNC: 119 MG/DL
POTASSIUM BLD-SCNC: 4.2 MMOL/L (ref 3.4–5.3)
SODIUM SERPL-SCNC: 139 MMOL/L (ref 133–144)
TRIGL SERPL-MCNC: 116 MG/DL

## 2021-10-20 PROCEDURE — 80061 LIPID PANEL: CPT | Performed by: PHYSICIAN ASSISTANT

## 2021-10-20 PROCEDURE — 73562 X-RAY EXAM OF KNEE 3: CPT | Mod: RT | Performed by: RADIOLOGY

## 2021-10-20 PROCEDURE — 80048 BASIC METABOLIC PNL TOTAL CA: CPT | Performed by: PHYSICIAN ASSISTANT

## 2021-10-20 PROCEDURE — 83036 HEMOGLOBIN GLYCOSYLATED A1C: CPT | Performed by: PHYSICIAN ASSISTANT

## 2021-10-20 PROCEDURE — 99214 OFFICE O/P EST MOD 30 MIN: CPT | Performed by: PHYSICIAN ASSISTANT

## 2021-10-20 PROCEDURE — 82043 UR ALBUMIN QUANTITATIVE: CPT | Performed by: PHYSICIAN ASSISTANT

## 2021-10-20 PROCEDURE — 36415 COLL VENOUS BLD VENIPUNCTURE: CPT | Performed by: PHYSICIAN ASSISTANT

## 2021-10-20 ASSESSMENT — MIFFLIN-ST. JEOR: SCORE: 1657.5

## 2021-10-20 ASSESSMENT — PAIN SCALES - GENERAL: PAINLEVEL: MODERATE PAIN (5)

## 2021-10-20 NOTE — PATIENT INSTRUCTIONS
Will order a knee x-ray today and depending on results, will consider an MRI.  I recommend ice and a knee sleeve.  Will consider physical therapy.    Continue current medications.

## 2021-10-23 ENCOUNTER — HEALTH MAINTENANCE LETTER (OUTPATIENT)
Age: 76
End: 2021-10-23

## 2021-11-01 ENCOUNTER — HOSPITAL ENCOUNTER (OUTPATIENT)
Dept: MRI IMAGING | Facility: CLINIC | Age: 76
Discharge: HOME | End: 2021-11-01
Attending: PHYSICIAN ASSISTANT | Admitting: PHYSICIAN ASSISTANT
Payer: COMMERCIAL

## 2021-11-01 DIAGNOSIS — S89.91XA KNEE INJURY, RIGHT, INITIAL ENCOUNTER: ICD-10-CM

## 2021-11-01 DIAGNOSIS — M25.561 ACUTE PAIN OF RIGHT KNEE: ICD-10-CM

## 2021-11-01 PROCEDURE — 73721 MRI JNT OF LWR EXTRE W/O DYE: CPT | Mod: 26 | Performed by: RADIOLOGY

## 2021-11-01 PROCEDURE — 73721 MRI JNT OF LWR EXTRE W/O DYE: CPT | Mod: RT

## 2022-02-12 ENCOUNTER — HEALTH MAINTENANCE LETTER (OUTPATIENT)
Age: 77
End: 2022-02-12

## 2022-06-01 ENCOUNTER — TRANSFERRED RECORDS (OUTPATIENT)
Dept: HEALTH INFORMATION MANAGEMENT | Facility: CLINIC | Age: 77
End: 2022-06-01
Payer: COMMERCIAL

## 2022-06-01 LAB — RETINOPATHY: NEGATIVE

## 2022-06-04 ENCOUNTER — HEALTH MAINTENANCE LETTER (OUTPATIENT)
Age: 77
End: 2022-06-04

## 2022-07-13 ENCOUNTER — OFFICE VISIT (OUTPATIENT)
Dept: FAMILY MEDICINE | Facility: OTHER | Age: 77
End: 2022-07-13
Payer: COMMERCIAL

## 2022-07-13 VITALS
WEIGHT: 210 LBS | SYSTOLIC BLOOD PRESSURE: 120 MMHG | RESPIRATION RATE: 20 BRPM | HEIGHT: 67 IN | DIASTOLIC BLOOD PRESSURE: 74 MMHG | OXYGEN SATURATION: 96 % | TEMPERATURE: 98.4 F | BODY MASS INDEX: 32.96 KG/M2 | HEART RATE: 92 BPM

## 2022-07-13 DIAGNOSIS — I10 ESSENTIAL HYPERTENSION WITH GOAL BLOOD PRESSURE LESS THAN 140/90: ICD-10-CM

## 2022-07-13 DIAGNOSIS — Z11.59 NEED FOR HEPATITIS C SCREENING TEST: ICD-10-CM

## 2022-07-13 DIAGNOSIS — Z12.11 SCREEN FOR COLON CANCER: ICD-10-CM

## 2022-07-13 DIAGNOSIS — M19.012 PRIMARY OSTEOARTHRITIS OF LEFT SHOULDER: ICD-10-CM

## 2022-07-13 DIAGNOSIS — R26.89 IMBALANCE: ICD-10-CM

## 2022-07-13 DIAGNOSIS — C20 MALIGNANT NEOPLASM OF RECTUM (H): ICD-10-CM

## 2022-07-13 DIAGNOSIS — E78.5 HYPERLIPIDEMIA LDL GOAL <100: ICD-10-CM

## 2022-07-13 DIAGNOSIS — Z00.00 ENCOUNTER FOR MEDICARE ANNUAL WELLNESS EXAM: Primary | ICD-10-CM

## 2022-07-13 DIAGNOSIS — G47.33 OBSTRUCTIVE SLEEP APNEA SYNDROME: ICD-10-CM

## 2022-07-13 DIAGNOSIS — Z23 NEED FOR COVID-19 VACCINE: ICD-10-CM

## 2022-07-13 DIAGNOSIS — E11.9 TYPE 2 DIABETES MELLITUS WITHOUT COMPLICATION, WITHOUT LONG-TERM CURRENT USE OF INSULIN (H): ICD-10-CM

## 2022-07-13 PROBLEM — E66.01 MORBID OBESITY (H): Status: RESOLVED | Noted: 2018-08-07 | Resolved: 2022-07-13

## 2022-07-13 LAB
ALBUMIN SERPL-MCNC: 3.8 G/DL (ref 3.4–5)
ALP SERPL-CCNC: 93 U/L (ref 40–150)
ALT SERPL W P-5'-P-CCNC: 30 U/L (ref 0–70)
ANION GAP SERPL CALCULATED.3IONS-SCNC: 6 MMOL/L (ref 3–14)
AST SERPL W P-5'-P-CCNC: 17 U/L (ref 0–45)
BILIRUB SERPL-MCNC: 0.5 MG/DL (ref 0.2–1.3)
BUN SERPL-MCNC: 20 MG/DL (ref 7–30)
CALCIUM SERPL-MCNC: 9.4 MG/DL (ref 8.5–10.1)
CHLORIDE BLD-SCNC: 107 MMOL/L (ref 94–109)
CO2 SERPL-SCNC: 25 MMOL/L (ref 20–32)
CREAT SERPL-MCNC: 0.84 MG/DL (ref 0.66–1.25)
GFR SERPL CREATININE-BSD FRML MDRD: 89 ML/MIN/1.73M2
GLUCOSE BLD-MCNC: 111 MG/DL (ref 70–99)
HBA1C MFR BLD: 5.8 % (ref 0–5.6)
POTASSIUM BLD-SCNC: 4.1 MMOL/L (ref 3.4–5.3)
PROT SERPL-MCNC: 7.3 G/DL (ref 6.8–8.8)
SODIUM SERPL-SCNC: 138 MMOL/L (ref 133–144)

## 2022-07-13 PROCEDURE — 0064A COVID-19,PF,MODERNA (18+ YRS BOOSTER .25ML): CPT | Performed by: PHYSICIAN ASSISTANT

## 2022-07-13 PROCEDURE — 86803 HEPATITIS C AB TEST: CPT | Performed by: PHYSICIAN ASSISTANT

## 2022-07-13 PROCEDURE — 90471 IMMUNIZATION ADMIN: CPT | Performed by: PHYSICIAN ASSISTANT

## 2022-07-13 PROCEDURE — 36415 COLL VENOUS BLD VENIPUNCTURE: CPT | Performed by: PHYSICIAN ASSISTANT

## 2022-07-13 PROCEDURE — 99207 PR FOOT EXAM NO CHARGE: CPT | Mod: 25 | Performed by: PHYSICIAN ASSISTANT

## 2022-07-13 PROCEDURE — G0439 PPPS, SUBSEQ VISIT: HCPCS | Mod: 25 | Performed by: PHYSICIAN ASSISTANT

## 2022-07-13 PROCEDURE — 80053 COMPREHEN METABOLIC PANEL: CPT | Performed by: PHYSICIAN ASSISTANT

## 2022-07-13 PROCEDURE — 83036 HEMOGLOBIN GLYCOSYLATED A1C: CPT | Performed by: PHYSICIAN ASSISTANT

## 2022-07-13 PROCEDURE — 99214 OFFICE O/P EST MOD 30 MIN: CPT | Mod: 25 | Performed by: PHYSICIAN ASSISTANT

## 2022-07-13 PROCEDURE — 91306 COVID-19,PF,MODERNA (18+ YRS BOOSTER .25ML): CPT | Performed by: PHYSICIAN ASSISTANT

## 2022-07-13 PROCEDURE — 90750 HZV VACC RECOMBINANT IM: CPT | Performed by: PHYSICIAN ASSISTANT

## 2022-07-13 RX ORDER — LISINOPRIL 10 MG/1
10 TABLET ORAL DAILY
Qty: 90 TABLET | Refills: 3 | Status: SHIPPED | OUTPATIENT
Start: 2022-07-13 | End: 2023-07-20

## 2022-07-13 RX ORDER — ATORVASTATIN CALCIUM 10 MG/1
10 TABLET, FILM COATED ORAL DAILY
Qty: 90 TABLET | Refills: 3 | Status: SHIPPED | OUTPATIENT
Start: 2022-07-13 | End: 2023-07-20

## 2022-07-13 ASSESSMENT — ENCOUNTER SYMPTOMS
HEARTBURN: 0
HEMATURIA: 0
ARTHRALGIAS: 0
CHILLS: 0
MYALGIAS: 1
ABDOMINAL PAIN: 0
WEAKNESS: 0
EYE PAIN: 0
COUGH: 0
FREQUENCY: 1
SORE THROAT: 0
CONSTIPATION: 0
JOINT SWELLING: 1
NERVOUS/ANXIOUS: 0
PARESTHESIAS: 0
HEMATOCHEZIA: 0
FEVER: 0
DIARRHEA: 1
NAUSEA: 0
DIZZINESS: 0
SHORTNESS OF BREATH: 0
HEADACHES: 0
DYSURIA: 0
PALPITATIONS: 0

## 2022-07-13 ASSESSMENT — PAIN SCALES - GENERAL: PAINLEVEL: NO PAIN (0)

## 2022-07-13 ASSESSMENT — ACTIVITIES OF DAILY LIVING (ADL): CURRENT_FUNCTION: NO ASSISTANCE NEEDED

## 2022-07-13 NOTE — PROGRESS NOTES
"SUBJECTIVE:   Iam Kaur is a 77 year old male who presents for Preventive/Wellness Visit.      Patient has been advised of split billing requirements and indicates understanding: Yes  Are you in the first 12 months of your Medicare coverage?  No    Healthy Habits:     In general, how would you rate your overall health?  Good    Frequency of exercise:  2-3 days/week    Duration of exercise:  15-30 minutes    Do you usually eat at least 4 servings of fruit and vegetables a day, include whole grains    & fiber and avoid regularly eating high fat or \"junk\" foods?  Yes    Taking medications regularly:  Yes    Medication side effects:  None    Ability to successfully perform activities of daily living:  No assistance needed    Home Safety:  Throw rugs in the hallway    Hearing Impairment:  No hearing concerns    In the past 6 months, have you been bothered by leaking of urine?  No    In general, how would you rate your overall mental or emotional health?  Good      PHQ-2 Total Score: 0    Additional concerns today:  No      His left shoulder pain and stiffness is getting worse. He is interested in seeing a surgeon to discuss next steps.     He has noticed intermittent imbalance, but only if walking in a narrow area such as on a dock. He otherwise feels like his balance is good in general. He denies any falls or dizziness.     He continues to have at least 4 mixed drinks with his wife nightly.     Do you feel safe in your environment? Yes    Have you ever done Advance Care Planning? (For example, a Health Directive, POLST, or a discussion with a medical provider or your loved ones about your wishes): Yes, advance care planning is on file.       Fall risk  Fallen 2 or more times in the past year?: No  Any fall with injury in the past year?: No    Cognitive Screening   1) Repeat 3 items (Leader, Season, Table)    2) Clock draw: NORMAL  3) 3 item recall: Recalls 1 object   Results: NORMAL clock, 1-2 items recalled: " COGNITIVE IMPAIRMENT LESS LIKELY    Mini-CogTM Copyright BALBIR Carpenter. Licensed by the author for use in [facility]; reprinted with permission (bry@.[facility]). All rights reserved.      Do you have sleep apnea, excessive snoring or daytime drowsiness?: yes    Reviewed and updated as needed this visit by clinical staff   Tobacco  Allergies  Meds  Problems  Med Hx  Surg Hx  Fam Hx  Soc   Hx          Reviewed and updated as needed this visit by Provider   Tobacco  Allergies  Meds  Problems  Med Hx  Surg Hx  Fam Hx           Social History     Tobacco Use     Smoking status: Never Smoker     Smokeless tobacco: Never Used     Tobacco comment: No smokers in home   Substance Use Topics     Alcohol use: Yes     Comment: 3-4 drinks per night     If you drink alcohol do you typically have >3 drinks per day or >7 drinks per week? Yes      Alcohol Use 7/13/2022   Prescreen: >3 drinks/day or >7 drinks/week? Yes   Prescreen: >3 drinks/day or >7 drinks/week? -   AUDIT SCORE  8       Current providers sharing in care for this patient include:   Patient Care Team:  Barak Cameron PA-C as PCP - General (Physician Assistant)  Barak Cameron PA-C as Assigned PCP  Laly Poon, RN as Diabetes Educator (Diabetes Education)  Ben Madison PA-C as Assigned Sleep Provider  Carlos Rodriguez PA-C as Assigned Musculoskeletal Provider    The following health maintenance items are reviewed in Epic and correct as of today:  Health Maintenance Due   Topic Date Due     HEPATITIS C SCREENING  Never done     ZOSTER IMMUNIZATION (2 of 3) 12/27/2012     COLORECTAL CANCER SCREENING  09/14/2021     COVID-19 Vaccine (4 - Booster for Moderna series) 03/19/2022     DIABETIC FOOT EXAM  05/17/2022     ANNUAL REVIEW OF HM ORDERS  05/17/2022         Review of Systems   Constitutional: Negative for chills and fever.   HENT: Negative for congestion, ear pain, hearing loss and sore throat.    Eyes: Negative for pain  "and visual disturbance.   Respiratory: Negative for cough and shortness of breath.    Cardiovascular: Negative for chest pain, palpitations and peripheral edema.   Gastrointestinal: Positive for diarrhea. Negative for abdominal pain, constipation, heartburn, hematochezia and nausea.   Genitourinary: Positive for frequency, impotence and urgency. Negative for dysuria, genital sores, hematuria and penile discharge.   Musculoskeletal: Positive for joint swelling and myalgias. Negative for arthralgias.   Skin: Negative for rash.   Neurological: Negative for dizziness, weakness, headaches and paresthesias.   Psychiatric/Behavioral: Negative for mood changes. The patient is not nervous/anxious.        OBJECTIVE:   /74 (BP Location: Left arm, Patient Position: Sitting, Cuff Size: Adult Large)   Pulse 92   Temp 98.4  F (36.9  C) (Temporal)   Resp 20   Ht 1.689 m (5' 6.5\")   Wt 95.3 kg (210 lb)   SpO2 96%   BMI 33.39 kg/m   Estimated body mass index is 33.39 kg/m  as calculated from the following:    Height as of this encounter: 1.689 m (5' 6.5\").    Weight as of this encounter: 95.3 kg (210 lb).  Physical Exam  GENERAL: healthy, alert and no distress  EYES: Eyes grossly normal to inspection, PERRL and conjunctivae and sclerae normal  HENT: ear canals and TM's normal, nose and mouth without ulcers or lesions  NECK: no adenopathy, no asymmetry, masses, or scars and thyroid normal to palpation  RESP: lungs clear to auscultation - no rales, rhonchi or wheezes  CV: regular rate and rhythm, normal S1 S2, no S3 or S4, no murmur, click or rub, no peripheral edema and peripheral pulses strong  ABDOMEN: soft, nontender, no hepatosplenomegaly, no masses and bowel sounds normal   (male): normal male circumcised genitalia without lesions or urethral discharge, no hernia  MS: no gross musculoskeletal defects noted, no edema. Significantly decreased left shoulder extension and abduction. He cannot lift his left arm above " shoulder height. No spinal tenderness.   SKIN: no suspicious lesions or rashes  NEURO: Normal strength and tone, mentation intact and speech normal. Cranial nerves II-XII are grossly intact. DTRs are 2+/4 throughout and symmetric. Gait is stable.  PSYCH: mentation appears normal, affect normal/bright    Lab Results   Component Value Date    A1C 5.8 07/13/2022    A1C 5.4 10/20/2021    A1C 5.5 05/17/2021    A1C 5.6 08/05/2020    A1C 7.1 05/08/2019    A1C 5.8 12/01/2017    A1C 6.1 07/17/2015       ASSESSMENT / PLAN:       ICD-10-CM    1. Encounter for Medicare annual wellness exam  Z00.00    2. Type 2 diabetes mellitus without complication, without long-term current use of insulin (H)  E11.9 Hemoglobin A1c     Hemoglobin A1c     metFORMIN (GLUCOPHAGE) 500 MG tablet     FOOT EXAM     Comprehensive metabolic panel (BMP + Alb, Alk Phos, ALT, AST, Total. Bili, TP)     Comprehensive metabolic panel (BMP + Alb, Alk Phos, ALT, AST, Total. Bili, TP)   3. Essential hypertension with goal blood pressure less than 140/90  I10 Basic metabolic panel  (Ca, Cl, CO2, Creat, Gluc, K, Na, BUN)     lisinopril (ZESTRIL) 10 MG tablet     Comprehensive metabolic panel (BMP + Alb, Alk Phos, ALT, AST, Total. Bili, TP)     Comprehensive metabolic panel (BMP + Alb, Alk Phos, ALT, AST, Total. Bili, TP)     CANCELED: Basic metabolic panel  (Ca, Cl, CO2, Creat, Gluc, K, Na, BUN)   4. Primary osteoarthritis of left shoulder  M19.012 Orthopedic  Referral   5. Malignant neoplasm of rectum (H)  C20    6. Obstructive sleep apnea syndrome  G47.33    7. Hyperlipidemia LDL goal <100  E78.5 atorvastatin (LIPITOR) 10 MG tablet   8. Imbalance  R26.89    9. Screen for colon cancer  Z12.11 Colonscopy Screening  Referral   10. Need for hepatitis C screening test  Z11.59 Hepatitis C Screen Reflex to HCV RNA Quant and Genotype     Hepatitis C Screen Reflex to HCV RNA Quant and Genotype   11. Need for COVID-19 vaccine  Z23 COVID-19,PF,MODERNA  "(18+ YRS BOOSTER .25ML)       1. Medicare wellness visit completed.    2. His diabetes remains under excellent control with a hemoglobin A1c of 5.8 today. Will continue current dose of metformin and plan on follow-up in 6-8 months when back from [place].    3. BP is stable. Continue current medications. Updated CMP ordered.    4. History of moderate/severe left glenohumeral arthritis with significantly decreased range of motion on exam with increasing pain over the past few months. Will refer to orthopedics to discuss possible surgery.    5, 9. History of rectal cancer. Due for updated colonoscopy.    6. I recommend he start using his CPAP again as he has not been using this as much lately and has noticed increased fatigue.     7. Continue Lipitor.    8. He has noticed intermittent imbalance but only when walking on narrow walkways. Normal gait and neurologic exam noted today. Continue to monitor and walk slowly when on docks or bridges.    10. Hepatitis C screening ordered.     11. Vaccine administered by MA.    Follow-up in 6 months.     Patient has been advised of split billing requirements and indicates understanding: Yes    COUNSELING:  Reviewed preventive health counseling, as reflected in patient instructions       Regular exercise       Healthy diet/nutrition    Estimated body mass index is 33.39 kg/m  as calculated from the following:    Height as of this encounter: 1.689 m (5' 6.5\").    Weight as of this encounter: 95.3 kg (210 lb).    Weight management plan: Discussed healthy diet and exercise guidelines    He reports that he has never smoked. He has never used smokeless tobacco.      Appropriate preventive services were discussed with this patient, including applicable screening as appropriate for cardiovascular disease, diabetes, osteopenia/osteoporosis, and glaucoma.  As appropriate for age/gender, discussed screening for colorectal cancer, prostate cancer, breast cancer, and cervical cancer. Checklist " reviewing preventive services available has been given to the patient.    Reviewed patients plan of care and provided an AVS. The Basic Care Plan (routine screening as documented in Health Maintenance) for Iam meets the Care Plan requirement. This Care Plan has been established and reviewed with the Patient and spouse.    Counseling Resources:  ATP IV Guidelines  Pooled Cohorts Equation Calculator  Breast Cancer Risk Calculator  Breast Cancer: Medication to Reduce Risk  FRAX Risk Assessment  ICSI Preventive Guidelines  Dietary Guidelines for Americans, 2010  USDA's MyPlate  ASA Prophylaxis  Lung CA Screening    [facility]    Identified Health Risks:

## 2022-07-13 NOTE — PATIENT INSTRUCTIONS
Try to cut back to 1-2 drinks per night.    Walk slowly and steadily. If worsening, let me know.    Will place a referral for a shoulder surgeon.    Will order an updated colonoscopy.    Follow-up when you get back from [place].

## 2022-07-14 LAB — HCV AB SERPL QL IA: NONREACTIVE

## 2022-07-18 ENCOUNTER — TELEPHONE (OUTPATIENT)
Dept: GASTROENTEROLOGY | Facility: CLINIC | Age: 77
End: 2022-07-18

## 2022-07-18 NOTE — TELEPHONE ENCOUNTER
Screening Questions    BlueKIND OF PREP RedLOCATION [review exclusion criteria] GreenSEDATION TYPE      1. Are you active on mychart? Y    2. What insurance is in the chart? BCBS     3.   Ordering/Referring Provider: Barak Cameron PA-C     4. BMI   (If greater than 40 review exclusion criteria [PAC APPT IF [MAC] @ [facility])  33.9  [If yes, BMI OVER 40-EXTENDED PREP]      **(Sedation review/consideration needed)**  Do you have a legal guardian or Medical Power of    and/or are you able to give consent for your medical care?     Y    5. Have you had a positive covid test in the last 90 days?   N -     6.  Are you currently on dialysis?   N [ If yes, G-PREP & HOSPITAL setting ONLY]     7.  Do you have chronic kidney disease?  N [ If yes, G-PREP ]    8.   Do you have a diagnosis of diabetes?   Y   [ If yes, G-PREP ]    9.  On a regular basis do you go 3-5 days between bowel movements?   Y   [ If yes, EXTENDED PREP]    10.  Are you taking any prescription pain medications on a routine schedule?    N -  [ If yes, EXTENDED PREP] [If yes, MAC]      11.   Do you have any chemical dependencies such as alcohol, street drugs, or methadone?    N [If yes, MAC]    12.   Do you have any history of post-traumatic stress syndrome, severe anxiety or history of psychosis?    N  [If yes, MAC]    13.  [FEMALES] Are you currently pregnant? NA    If yes, how many weeks?       Respiratory/Heart Screening:  [If yes to any of the following HOSPITAL setting only]     14. Do you have Pulmonary Hypertension [Lungs]?   N       15. Do you have UNCONTROLLED asthma?   N     16.  Do you use daily home oxygen?  N      17. Do you have mod to severe Obstructive Sleep Apnea?         (OKAY @ [facility]  [facility]  [facility]  [facility]  [facility]  [facility] - if pt is not on OXYGEN)  N, uses CPAP      18.   Have you had a heart or lung transplant?   N      19.   Have you had a stroke or Transient ischemic attack (TIA - aka  mini stroke ) within 6 months?  (If yes, please review  exclusion criteria)  N     20.   In the past 6 months, have you had any heart related issues including cardiomyopathy or heart attack?   NA           If yes, did it require cardiac stenting or other implantable device?   N      21.   Do you have any implantable devices in your body (pacemaker, defib, LVAD)? (If yes, please review exclusion criteria)  N     22. Do you take nitroglycerin?   NA           If yes, how often? N  (if yes, HOSPITAL setting ONLY)    23.  Are you currently taking any blood thinners?    [If yes, INFORM patient to follw up w/ ORDERING PROVIDER FOR BRIDGING INSTRUCTIONS]     N    24.   Do you transfer independently?                (If NO, please HOSPITAL setting ONLY)  Y    25.   Preferred LOCAL Pharmacy for Pre Prescription:       [facility] [address] [facility] [address] [facility]    Scheduling Details  (Please ask for phone number if not scheduled by patient)      Caller : Iam Kaur    Date of Procedure: 8/24  Surgeon: Marck  Location:         Sedation Type: CS l Per Protocol  Conscious Sedation- Needs  for 6 hours after the procedure  MAC/General-Needs  for 24 hours after procedure    N :[Pre-op Required] at [facility]  MG and OR for MAC sedation   (advise patient they will need a pre-op WITH IN 30 DAYS of procedure date)     Type of Procedure Scheduled:   Lower Endoscopy [Colonoscopy]    Which Colonoscopy Prep was Sent?:   Extended - Per Protocol    CHIRAG CF PATIENTS & GROEN'S PATIENTS NEEDS EXTENDED PREP       Informed patient they will need an adult  Y  Cannot take any type of public or medical transportation alone    Pre-Procedure Covid test to be completed at [facility] or Externally: Y  **INFORMED OF HOME TESTING & LAB OPTION**        Confirmed Nurse will call to complete assessment Y    Additional comments:

## 2022-08-04 ENCOUNTER — OFFICE VISIT (OUTPATIENT)
Dept: ORTHOPEDICS | Facility: OTHER | Age: 77
End: 2022-08-04
Attending: PHYSICIAN ASSISTANT

## 2022-08-04 ENCOUNTER — ANCILLARY PROCEDURE (OUTPATIENT)
Dept: GENERAL RADIOLOGY | Facility: OTHER | Age: 77
End: 2022-08-04
Attending: ORTHOPAEDIC SURGERY
Payer: COMMERCIAL

## 2022-08-04 VITALS — WEIGHT: 218 LBS | HEIGHT: 67 IN | BODY MASS INDEX: 34.21 KG/M2 | RESPIRATION RATE: 20 BRPM

## 2022-08-04 DIAGNOSIS — M75.122 NONTRAUMATIC COMPLETE TEAR OF LEFT ROTATOR CUFF: ICD-10-CM

## 2022-08-04 DIAGNOSIS — M19.012 PRIMARY OSTEOARTHRITIS OF LEFT SHOULDER: Primary | ICD-10-CM

## 2022-08-04 DIAGNOSIS — M19.012 PRIMARY OSTEOARTHRITIS OF LEFT SHOULDER: ICD-10-CM

## 2022-08-04 PROCEDURE — 99213 OFFICE O/P EST LOW 20 MIN: CPT | Performed by: ORTHOPAEDIC SURGERY

## 2022-08-04 PROCEDURE — 73030 X-RAY EXAM OF SHOULDER: CPT | Mod: TC | Performed by: RADIOLOGY

## 2022-08-04 NOTE — LETTER
8/4/2022         RE: Iam Kaur  [address] [place] [facility] [address]        Dear Colleague,    Thank you for referring your patient, Iam Kaur, to the [facility] [place]. Please see a copy of my visit note below.    Iam Kaur is a 77 year old male who is seen as self referral for left shoulder pain.   He has a long history of left shoulder pain.  We have x-ray from 2006 showing significant osteoarthritis of the left shoulder.  He did have bilateral shoulder surgeries in high school for instability and has staples in the shoulder.  He now has aching pain rated 3 out of 10.  Worse with use.  He has very little motion of the shoulder.  He has had steroid injection which has given some relief.  His last injection was 6/4/2021 into the subacromial space as my team felt his rotator cuff was disrupted so a subacromial space injection would get down into the joint.  It gave good relief indicating that his rotator cuff is disrupted.    X-ray shows severe osteoarthritis of his left shoulder with glenoid wear and significant spurring on the inferior humeral neck.  There is also significant erosion of the humeral joint surface.  By comparing to x-rays back in 2006 it is apparent that a significant amount of the glenoid is eroded.    Past Medical History:   Diagnosis Date     Chest pain, unspecified 06/22/1999    Atypical chest pain, felt to be stress related, negative GXT     Closed dislocation of acromioclavicular (joint) 1962 & 1990    Chronic dislocations of shoulder - two surgeries with good results     Irritable bowel syndrome      Malignant neoplasm of colon, unspecified site 2000    Colon cancer/ rectal, Dr. Tee     Pneumonia, organism unspecified(486) 1977    Hospitalized 3 to 4 days     Sleep apnea 11/09    severe, recommended CPAP       Past Surgical History:   Procedure Laterality Date     C EXCIS RECTAL LESION,TRANSANAL  05/09/2000    Transanal  excision of rectal polyp, tubulovillous adenoma with severe dysplasia     COLONOSCOPY  03/05/07    repeat in 3 yrs     COLONOSCOPY  06/09/10    repeat in 3 yrs     COLONOSCOPY  6/11/13    colonoscopy, polypectomy, recheck in 3 years     COLONOSCOPY N/A 9/14/2018    Procedure: COMBINED COLONOSCOPY, SINGLE OR MULTIPLE BIOPSY/POLYPECTOMY BY BIOPSY;;  Surgeon: Rosendo Delgado MD;  Location: [facility] OR     COLONOSCOPY WITH CO2 INSUFFLATION N/A 9/14/2018    Procedure: COLONOSCOPY WITH CO2 INSUFFLATION;  Colonoscopy  Loose stools  BMI 37.17  Pharmacy: [facility] - Fax [phone]  Referred by Dr. Delgado;  Surgeon: Rosendo Delgado MD;  Location: [facility] OR     ESOPHAGOSCOPY, GASTROSCOPY, DUODENOSCOPY (EGD), COMBINED  11/30/2010    COMBINED ESOPHAGOSCOPY, GASTROSCOPY, DUODENOSCOPY (EGD), BIOPSY SINGLE OR MULTIPLE performed by SHGAUFTA ROLDAN at  GI     HC REMOVAL OF TONSILS,<13 Y/O      Unsure of age at time of surgery     HC REPAIR ROTATOR CUFF,ACUTE  1962, 1966    Bilateral shoulder surgery for chronic dislocation     INJECT EPIDURAL LUMBAR  10/14/2014    [facility]     REMOVAL OF SPERM DUCT(S)  1975    Vasectomy     ZZHC COLONOSCOPY THRU STOMA, DIAGNOSTIC  2001       Family History   Problem Relation Age of Onset     Alzheimer Disease Father      Cerebrovascular Disease Father         x 3-4 episodes     Arthritis Mother         RA     Breast Cancer Sister      Heart Disease Sister      Heart Disease Sister      Other Cancer Brother      Diabetes Paternal Grandmother      Heart Disease Maternal Grandmother      Heart Disease Maternal Grandfather      Hypertension Brother        Social History     Socioeconomic History     Marital status:      Spouse name: June     Number of children: 2     Years of education: 16     Highest education level: Not on file   Occupational History     Occupation:      Comment: [employer]   Tobacco Use     Smoking  status: Never Smoker     Smokeless tobacco: Never Used     Tobacco comment: No smokers in home   Vaping Use     Vaping Use: Never used   Substance and Sexual Activity     Alcohol use: Yes     Comment: 3-4 drinks per night     Drug use: No     Comment: caffeine 3-4 daily     Sexual activity: Yes     Partners: Female     Birth control/protection: Surgical     Comment: vasectomy   Other Topics Concern      Service No     Blood Transfusions No     Caffeine Concern No     Comment: 6 cups/day     Occupational Exposure No     Hobby Hazards No     Comment: golfs, fishes, hunts     Sleep Concern Yes     Comment: doesn't sleep under stress well     Stress Concern Yes     Comment: work     Weight Concern Yes     Special Diet No     Back Care No     Exercise Yes     Comment: 3/week     Bike Helmet No     Seat Belt Yes     Self-Exams No     Parent/sibling w/ CABG, MI or angioplasty before 65F 55M? Not Asked   Social History Narrative     Not on file     Social Determinants of Health     Financial Resource Strain: Not on file   Food Insecurity: Not on file   Transportation Needs: Not on file   Physical Activity: Not on file   Stress: Not on file   Social Connections: Not on file   Intimate Partner Violence: Not on file   Housing Stability: Not on file       Current Outpatient Medications   Medication Sig Dispense Refill     alcohol swab prep pads Use to swab area of injection/shazia as directed. 100 each 3     ASPIRIN 81 MG OR TABS ONE DAILY 0 0     atorvastatin (LIPITOR) 10 MG tablet Take 1 tablet (10 mg) by mouth daily 90 tablet 3     blood glucose (NO BRAND SPECIFIED) test strip Use to test blood sugar 3 times daily or as directed. To accompany: Blood Glucose Monitor Brands: per insurance. 100 strip 6     blood glucose calibration (NO BRAND SPECIFIED) solution To accompany: Blood Glucose Monitor Brands: per insurance. 1 Bottle 3     blood glucose monitoring (NO BRAND SPECIFIED) meter device kit Use to test blood sugar 3  "times daily or as directed. Blood Glucose Monitor Brands: per insurance. 1 kit 0     lisinopril (ZESTRIL) 10 MG tablet Take 1 tablet (10 mg) by mouth daily 90 tablet 3     metFORMIN (GLUCOPHAGE) 500 MG tablet Take 1 tablet (500 mg) by mouth 2 times daily (with meals) 180 tablet 2     multivitamin w/minerals (THERA-VIT-M) tablet Take 1 tablet by mouth daily       thin (NO BRAND SPECIFIED) lancets Use to check glucose 3 times daily. To accompany: Blood Glucose Monitor Brands: per insurance. 100 each 6       Allergies   Allergen Reactions     Naproxen Itching       REVIEW OF SYSTEMS:  CONSTITUTIONAL:  NEGATIVE for fever, chills, change in weight, not feeling tired  SKIN:  NEGATIVE for worrisome rashes, no skin lumps, no skin ulcers and no non-healing wounds  EYES:  NEGATIVE for vision changes or irritation.  ENT/MOUTH:  NEGATIVE.  No hearing loss, no hoarseness, no difficulty swallowing.  RESP:  NEGATIVE. No cough or shortness of breath.  CV:  NEGATIVE for chest pain, palpitations or peripheral edema  GI:  NEGATIVE for nausea, abdominal pain, heartburn, or change in bowel habits  :  Negative. No dysuria, no hematuria  MUSCULOSKELETAL:  See HPI above  NEURO:  NEGATIVE . No headaches, no dizziness,  no numbness  ENDOCRINE:  NEGATIVE for temperature intolerance, skin/hair changes  HEME/ALLERGY/IMMUNE:  NEGATIVE for bleeding problems  PSYCHIATRIC:  NEGATIVE. no anxiety, no depression.     Exam:  Vitals: Resp 20   Ht 1.689 m (5' 6.5\")   Wt 98.9 kg (218 lb)   BMI 34.66 kg/m    BMI= Body mass index is 34.66 kg/m .  Constitutional:  healthy, alert and no distress  Neuro: Alert and Oriented x 3, no focal defects.  Psych: Affect normal   Respiratory: Breathing not labored.  Cardiovascular: normal peripheral pulses  Lymph: no adenopathy  Skin: No rashes,worrisome lesions or skin problems  He has no internal and external rotation of the shoulder at the glenohumeral joint on the left.  He has flexion to about 45 degrees.  Much " of the motion he does exhibit on the shoulder is all scapulothoracic motion.    [place]:  Severe left shoulder osteoarthritis with disrupted rotator cuff.    Plan: He would be a candidate for left reverse total shoulder arthroplasty.  Given the loss of bone from the glenoid, I feel it is best to send him to the [facility] for this as he may require bone graft for this.      Again, thank you for allowing me to participate in the care of your patient.        Sincerely,        Aristeo Kelley MD

## 2022-08-05 NOTE — TELEPHONE ENCOUNTER
DIAGNOSIS: Primary osteoarthritis of left shoulder    APPOINTMENT DATE: 09/08/2022   NOTES STATUS DETAILS   OFFICE NOTE from referring provider Internal 08/04/2022 Dr Kelley [facility]   OFFICE NOTE from other specialist N/A    DISCHARGE SUMMARY from hospital N/A    DISCHARGE REPORT from the ER N/A    OPERATIVE REPORT N/A    MEDICATION LIST N/A    EMG (for Spine) N/A    IMPLANT RECORD/STICKER N/A    LABS     CBC/DIFF N/A    CULTURES N/A    INJECTIONS DONE IN RADIOLOGY N/A    MRI N/A    CT SCAN N/A    XRAYS (IMAGES & REPORTS) Internal 08/04/2022 LFT shoulder  06/04/2021 LFT shoulder   TUMOR     PATHOLOGY  Slides & report N/A

## 2022-08-06 PROBLEM — M75.122 NONTRAUMATIC COMPLETE TEAR OF LEFT ROTATOR CUFF: Status: ACTIVE | Noted: 2022-08-06

## 2022-08-07 NOTE — PROGRESS NOTES
Iam Kaur is a 77 year old male who is seen as self referral for left shoulder pain.   He has a long history of left shoulder pain.  We have x-ray from 2006 showing significant osteoarthritis of the left shoulder.  He did have bilateral shoulder surgeries in high school for instability and has staples in the shoulder.  He now has aching pain rated 3 out of 10.  Worse with use.  He has very little motion of the shoulder.  He has had steroid injection which has given some relief.  His last injection was 6/4/2021 into the subacromial space as my team felt his rotator cuff was disrupted so a subacromial space injection would get down into the joint.  It gave good relief indicating that his rotator cuff is disrupted.    X-ray shows severe osteoarthritis of his left shoulder with glenoid wear and significant spurring on the inferior humeral neck.  There is also significant erosion of the humeral joint surface.  By comparing to x-rays back in 2006 it is apparent that a significant amount of the glenoid is eroded.    Past Medical History:   Diagnosis Date     Chest pain, unspecified 06/22/1999    Atypical chest pain, felt to be stress related, negative GXT     Closed dislocation of acromioclavicular (joint) 1962 & 1990    Chronic dislocations of shoulder - two surgeries with good results     Irritable bowel syndrome      Malignant neoplasm of colon, unspecified site 2000    Colon cancer/ rectal, Dr. Tee     Pneumonia, organism unspecified(486) 1977    Hospitalized 3 to 4 days     Sleep apnea 11/09    severe, recommended CPAP       Past Surgical History:   Procedure Laterality Date     C EXCIS RECTAL LESION,TRANSANAL  05/09/2000    Transanal excision of rectal polyp, tubulovillous adenoma with severe dysplasia     COLONOSCOPY  03/05/07    repeat in 3 yrs     COLONOSCOPY  06/09/10    repeat in 3 yrs     COLONOSCOPY  6/11/13    colonoscopy, polypectomy, recheck in 3 years     COLONOSCOPY N/A 9/14/2018     Procedure: COMBINED COLONOSCOPY, SINGLE OR MULTIPLE BIOPSY/POLYPECTOMY BY BIOPSY;;  Surgeon: Rosendo Delgado MD;  Location: [facility]     COLONOSCOPY WITH CO2 INSUFFLATION N/A 9/14/2018    Procedure: COLONOSCOPY WITH CO2 INSUFFLATION;  Colonoscopy  Loose stools  BMI 37.17  Pharmacy: [facility] [place] - Fax [phone]  Referred by Dr. Delgado;  Surgeon: Rosendo Delgado MD;  Location: [facility] OR     ESOPHAGOSCOPY, GASTROSCOPY, DUODENOSCOPY (EGD), COMBINED  11/30/2010    COMBINED ESOPHAGOSCOPY, GASTROSCOPY, DUODENOSCOPY (EGD), BIOPSY SINGLE OR MULTIPLE performed [facility]AN at  [facility]     HC REMOVAL OF TONSILS,<11 Y/O      Unsure of age at time of surgery     HC REPAIR ROTATOR CUFF,ACUTE  1962, 1966    Bilateral shoulder surgery for chronic dislocation     INJECT EPIDURAL LUMBAR  10/14/2014    [facility]     REMOVAL OF SPERM DUCT(S)  1975    Vasectomy     [facility] COLONOSCOPY THRU STOMA, DIAGNOSTIC  2001       Family History   Problem Relation Age of Onset     Alzheimer Disease Father      Cerebrovascular Disease Father         x 3-4 episodes     Arthritis Mother         RA     Breast Cancer Sister      Heart Disease Sister      Heart Disease Sister      Other Cancer Brother      Diabetes Paternal Grandmother      Heart Disease Maternal Grandmother      Heart Disease Maternal Grandfather      Hypertension Brother        Social History     Socioeconomic History     Marital status:      Spouse name: June     Number of children: 2     Years of education: 16     Highest education level: Not on file   Occupational History     Occupation:      Comment: [employer]   Tobacco Use     Smoking status: Never Smoker     Smokeless tobacco: Never Used     Tobacco comment: No smokers in home   Vaping Use     Vaping Use: Never used   Substance and Sexual Activity     Alcohol use: Yes     Comment: 3-4 drinks per night     Drug use: No     Comment: caffeine 3-4 daily      Sexual activity: Yes     Partners: Female     Birth control/protection: Surgical     Comment: vasectomy   Other Topics Concern      Service No     Blood Transfusions No     Caffeine Concern No     Comment: 6 cups/day     Occupational Exposure No     Hobby Hazards No     Comment: golfs, fishes, hunts     Sleep Concern Yes     Comment: doesn't sleep under stress well     Stress Concern Yes     Comment: work     Weight Concern Yes     Special Diet No     Back Care No     Exercise Yes     Comment: 3/week     Bike Helmet No     Seat Belt Yes     Self-Exams No     Parent/sibling w/ CABG, MI or angioplasty before 65F 55M? Not Asked   Social History Narrative     Not on file     Social Determinants of Health     Financial Resource Strain: Not on file   Food Insecurity: Not on file   Transportation Needs: Not on file   Physical Activity: Not on file   Stress: Not on file   Social Connections: Not on file   Intimate Partner Violence: Not on file   Housing Stability: Not on file       Current Outpatient Medications   Medication Sig Dispense Refill     alcohol swab prep pads Use to swab area of injection/shazia as directed. 100 each 3     ASPIRIN 81 MG OR TABS ONE DAILY 0 0     atorvastatin (LIPITOR) 10 MG tablet Take 1 tablet (10 mg) by mouth daily 90 tablet 3     blood glucose (NO BRAND SPECIFIED) test strip Use to test blood sugar 3 times daily or as directed. To accompany: Blood Glucose Monitor Brands: per insurance. 100 strip 6     blood glucose calibration (NO BRAND SPECIFIED) solution To accompany: Blood Glucose Monitor Brands: per insurance. 1 Bottle 3     blood glucose monitoring (NO BRAND SPECIFIED) meter device kit Use to test blood sugar 3 times daily or as directed. Blood Glucose Monitor Brands: per insurance. 1 kit 0     lisinopril (ZESTRIL) 10 MG tablet Take 1 tablet (10 mg) by mouth daily 90 tablet 3     metFORMIN (GLUCOPHAGE) 500 MG tablet Take 1 tablet (500 mg) by mouth 2 times daily (with meals)  "180 tablet 2     multivitamin w/minerals (THERA-VIT-M) tablet Take 1 tablet by mouth daily       thin (NO BRAND SPECIFIED) lancets Use to check glucose 3 times daily. To accompany: Blood Glucose Monitor Brands: per insurance. 100 each 6       Allergies   Allergen Reactions     Naproxen Itching       REVIEW OF SYSTEMS:  CONSTITUTIONAL:  NEGATIVE for fever, chills, change in weight, not feeling tired  SKIN:  NEGATIVE for worrisome rashes, no skin lumps, no skin ulcers and no non-healing wounds  EYES:  NEGATIVE for vision changes or irritation.  ENT/MOUTH:  NEGATIVE.  No hearing loss, no hoarseness, no difficulty swallowing.  RESP:  NEGATIVE. No cough or shortness of breath.  CV:  NEGATIVE for chest pain, palpitations or peripheral edema  GI:  NEGATIVE for nausea, abdominal pain, heartburn, or change in bowel habits  :  Negative. No dysuria, no hematuria  MUSCULOSKELETAL:  See HPI above  NEURO:  NEGATIVE . No headaches, no dizziness,  no numbness  ENDOCRINE:  NEGATIVE for temperature intolerance, skin/hair changes  HEME/ALLERGY/IMMUNE:  NEGATIVE for bleeding problems  PSYCHIATRIC:  NEGATIVE. no anxiety, no depression.     Exam:  Vitals: Resp 20   Ht 1.689 m (5' 6.5\")   Wt 98.9 kg (218 lb)   BMI 34.66 kg/m    BMI= Body mass index is 34.66 kg/m .  Constitutional:  healthy, alert and no distress  Neuro: Alert and Oriented x 3, no focal defects.  Psych: Affect normal   Respiratory: Breathing not labored.  Cardiovascular: normal peripheral pulses  Lymph: no adenopathy  Skin: No rashes,worrisome lesions or skin problems  He has no internal and external rotation of the shoulder at the glenohumeral joint on the left.  He has flexion to about 45 degrees.  Much of the motion he does exhibit on the shoulder is all scapulothoracic motion.    [place]:  Severe left shoulder osteoarthritis with disrupted rotator cuff.    Plan: He would be a candidate for left reverse total shoulder arthroplasty.  Given the loss of bone from the " glenoid, I feel it is best to send him to the [facility] for this as he may require bone graft for this.

## 2022-08-16 RX ORDER — BISACODYL 5 MG
TABLET, DELAYED RELEASE (ENTERIC COATED) ORAL
Qty: 4 TABLET | Refills: 0 | Status: SHIPPED | OUTPATIENT
Start: 2022-08-16 | End: 2022-09-15

## 2022-08-18 ENCOUNTER — TELEPHONE (OUTPATIENT)
Dept: ORTHOPEDICS | Facility: CLINIC | Age: 77
End: 2022-08-18

## 2022-08-18 NOTE — TELEPHONE ENCOUNTER
8/18 Called and left voicemail. Provided phone number [phone] to schedule ct scan before 9/8 appointment with Dr. La.    Nasreen Hop l Procedure   Orthopedics, Podiatry, Sports Medicine, ENT/Eye Specialties  [facility]   [phone]      ----- Message from Yo Sarkar RN sent at 8/18/2022 11:11 AM CDT -----  Regarding: CT prior to Appt on 9/8  William Downey,    Would you be able to reach out and help this Pt schedule their left shoulder CT? We would like it done before the visit (not same day) on 9/8/22 with Dr. La.    Isidro Sarkar RN

## 2022-08-24 ENCOUNTER — HOSPITAL ENCOUNTER (OUTPATIENT)
Facility: AMBULATORY SURGERY CENTER | Age: 77
Discharge: HOME | End: 2022-08-24
Attending: SURGERY | Admitting: SURGERY
Payer: COMMERCIAL

## 2022-08-24 VITALS
RESPIRATION RATE: 16 BRPM | HEART RATE: 95 BPM | OXYGEN SATURATION: 96 % | SYSTOLIC BLOOD PRESSURE: 143 MMHG | DIASTOLIC BLOOD PRESSURE: 95 MMHG | TEMPERATURE: 97.5 F

## 2022-08-24 DIAGNOSIS — Z12.11 SCREENING FOR COLON CANCER: Primary | ICD-10-CM

## 2022-08-24 LAB
COLONOSCOPY: NORMAL
GLUCOSE BLDC GLUCOMTR-MCNC: 121 MG/DL (ref 70–99)

## 2022-08-24 PROCEDURE — 45385 COLONOSCOPY W/LESION REMOVAL: CPT | Mod: PT | Performed by: SURGERY

## 2022-08-24 PROCEDURE — G8918 PT W/O PREOP ORDER IV AB PRO: HCPCS

## 2022-08-24 PROCEDURE — G0500 MOD SEDAT ENDO SERVICE >5YRS: HCPCS | Performed by: SURGERY

## 2022-08-24 PROCEDURE — 45385 COLONOSCOPY W/LESION REMOVAL: CPT | Mod: PT

## 2022-08-24 PROCEDURE — G8907 PT DOC NO EVENTS ON DISCHARG: HCPCS

## 2022-08-24 RX ORDER — FENTANYL CITRATE 50 UG/ML
INJECTION, SOLUTION INTRAMUSCULAR; INTRAVENOUS PRN
Status: DISCONTINUED | OUTPATIENT
Start: 2022-08-24 | End: 2022-08-24 | Stop reason: HOSPADM

## 2022-08-25 LAB
PATH REPORT.COMMENTS IMP SPEC: NORMAL
PATH REPORT.COMMENTS IMP SPEC: NORMAL
PATH REPORT.FINAL DX SPEC: NORMAL
PATH REPORT.GROSS SPEC: NORMAL
PATH REPORT.MICROSCOPIC SPEC OTHER STN: NORMAL
PATH REPORT.RELEVANT HX SPEC: NORMAL
PHOTO IMAGE: NORMAL

## 2022-08-25 PROCEDURE — 88305 TISSUE EXAM BY PATHOLOGIST: CPT | Performed by: PATHOLOGY

## 2022-08-26 ENCOUNTER — ANCILLARY PROCEDURE (OUTPATIENT)
Dept: CT IMAGING | Facility: CLINIC | Age: 77
End: 2022-08-26
Attending: ORTHOPAEDIC SURGERY
Payer: COMMERCIAL

## 2022-08-26 DIAGNOSIS — M19.012 PRIMARY OSTEOARTHRITIS OF LEFT SHOULDER: ICD-10-CM

## 2022-08-26 PROCEDURE — 73200 CT UPPER EXTREMITY W/O DYE: CPT | Mod: LT | Performed by: RADIOLOGY

## 2022-09-08 ENCOUNTER — PRE VISIT (OUTPATIENT)
Dept: ORTHOPEDICS | Facility: CLINIC | Age: 77
End: 2022-09-08

## 2022-09-08 ENCOUNTER — OFFICE VISIT (OUTPATIENT)
Dept: ORTHOPEDICS | Facility: CLINIC | Age: 77
End: 2022-09-08
Attending: ORTHOPAEDIC SURGERY
Payer: COMMERCIAL

## 2022-09-08 VITALS — BODY MASS INDEX: 34.72 KG/M2 | HEIGHT: 66 IN | WEIGHT: 216 LBS

## 2022-09-08 DIAGNOSIS — M19.012 PRIMARY OSTEOARTHRITIS OF LEFT SHOULDER: Primary | ICD-10-CM

## 2022-09-08 DIAGNOSIS — M19.012 PRIMARY OSTEOARTHRITIS OF LEFT SHOULDER: ICD-10-CM

## 2022-09-08 PROCEDURE — 99214 OFFICE O/P EST MOD 30 MIN: CPT | Performed by: ORTHOPAEDIC SURGERY

## 2022-09-08 NOTE — PROGRESS NOTES
"CHIEF CONCERN:  Left shoulder pain    HISTORY OF PRESENT ILLNESS:  Mr. Kaur is a 77 year old LHD male I am seeing for left shoulder pain. He had a history of B shoulder instability. The left was always worse. He had surgery in '65 (L) and '63 (R). Shoulders were stable thereafter. He has had injections with his PCP but that didn't help.  Per Dr. Kelley on 8/4/22: \"He has a long history of left shoulder pain.  We have x-ray from 2006 showing significant osteoarthritis of the left shoulder.  He did have bilateral shoulder surgeries in high school for instability and has staples in the shoulder.  He now has aching pain rated 3 out of 10.  Worse with use.  He has very little motion of the shoulder.  He has had steroid injection which has given some relief.  His last injection was 6/4/2021 into the subacromial space as my team felt his rotator cuff was disrupted so a subacromial space injection would get down into the joint.  It gave good relief indicating that his rotator cuff is disrupted.\"    Past Medical History:   Diagnosis Date     Chest pain, unspecified 06/22/1999    Atypical chest pain, felt to be stress related, negative GXT     Closed dislocation of acromioclavicular (joint) 1962 & 1990    Chronic dislocations of shoulder - two surgeries with good results     Irritable bowel syndrome      Malignant neoplasm of colon, unspecified site 2000    Colon cancer/ rectal, Dr. Tee     Pneumonia, organism unspecified(486) 1977    Hospitalized 3 to 4 days     Sleep apnea 11/09    severe, recommended CPAP       Past Surgical History:   Procedure Laterality Date     C EXCIS RECTAL LESION,TRANSANAL  05/09/2000    Transanal excision of rectal polyp, tubulovillous adenoma with severe dysplasia     COLONOSCOPY  03/05/07    repeat in 3 yrs     COLONOSCOPY  06/09/10    repeat in 3 yrs     COLONOSCOPY  6/11/13    colonoscopy, polypectomy, recheck in 3 years     COLONOSCOPY N/A 9/14/2018    Procedure: COMBINED COLONOSCOPY, " SINGLE OR MULTIPLE BIOPSY/POLYPECTOMY BY BIOPSY;;  Surgeon: Rosendo Delgado MD;  Location: [facility]     COLONOSCOPY N/A 8/24/2022    Procedure: COLONOSCOPY, FLEXIBLE, WITH LESION REMOVAL USING SNARE;  Surgeon: Armando Acharya DO;  Location: [facility]     COLONOSCOPY WITH CO2 INSUFFLATION N/A 9/14/2018    Procedure: COLONOSCOPY WITH CO2 INSUFFLATION;  Colonoscopy  Loose stools  BMI 37.17  Pharmacy: [facility] [place]  Fax [phone]  Referred by Dr. Delgado;  Surgeon: Rosendo Delgado MD;  Location: [facility]     COLONOSCOPY WITH CO2 INSUFFLATION N/A 8/24/2022    Procedure: COLONOSCOPY, WITH CO2 INSUFFLATION;  Surgeon: Armando Acharya DO;  Location: [facility]     ESOPHAGOSCOPY, GASTROSCOPY, DUODENOSCOPY (EGD), COMBINED  11/30/2010    COMBINED ESOPHAGOSCOPY, GASTROSCOPY, DUODENOSCOPY (EGD), BIOPSY SINGLE OR MULTIPLE performed by [facility]AN at  [facility]     HC REMOVAL OF TONSILS,<12 Y/O      Unsure of age at time of surgery     HC REPAIR ROTATOR CUFF,ACUTE  1962, 1966    Bilateral shoulder surgery for chronic dislocation     INJECT EPIDURAL LUMBAR  10/14/2014    [facility]     REMOVAL OF SPERM DUCT(S)  1975    Vasectomy     [facility] COLONOSCOPY THRU STOMA, DIAGNOSTIC  2001       Current Outpatient Medications   Medication Sig Dispense Refill     alcohol swab prep pads Use to swab area of injection/shazia as directed. 100 each 3     ASPIRIN 81 MG OR TABS ONE DAILY 0 0     atorvastatin (LIPITOR) 10 MG tablet Take 1 tablet (10 mg) by mouth daily 90 tablet 3     bisacodyl (DULCOLAX) 5 MG EC tablet Take two (2) tablet at 4 pm the day before your procedure.  If your procedure is before 11 am, take two (2) additional tablets at 8 pm.  If your procedure is after 11 am, take two (2) additional tablets at 6 am. For additional instructions refer to your colonoscopy prep instructions. 4 tablet 0     blood glucose (NO BRAND SPECIFIED) test strip Use to test blood sugar 3 times daily or as directed. To  accompany: Blood Glucose Monitor Brands: per insurance. 100 strip 6     blood glucose calibration (NO BRAND SPECIFIED) solution To accompany: Blood Glucose Monitor Brands: per insurance. 1 Bottle 3     blood glucose monitoring (NO BRAND SPECIFIED) meter device kit Use to test blood sugar 3 times daily or as directed. Blood Glucose Monitor Brands: per insurance. 1 kit 0     lisinopril (ZESTRIL) 10 MG tablet Take 1 tablet (10 mg) by mouth daily 90 tablet 3     metFORMIN (GLUCOPHAGE) 500 MG tablet Take 1 tablet (500 mg) by mouth 2 times daily (with meals) 180 tablet 2     multivitamin w/minerals (THERA-VIT-M) tablet Take 1 tablet by mouth daily       polyethylene glycol (GOLYTELY) 236 g suspension The night before the exam: at 6 pm start drinking an 8-ounce glass every 15 minutes until the jug is half empty (about 8 glasses). Day of exam: 6 hours before your exam check-in Drink the other half of the jug. You should finish the prep 4 hours before the exam. For additional instructions refer to your colonoscopy prep instructions. 4000 mL 0     thin (NO BRAND SPECIFIED) lancets Use to check glucose 3 times daily. To accompany: Blood Glucose Monitor Brands: per insurance. 100 each 6          Allergies   Allergen Reactions     Naproxen Itching       SOCIAL HISTORY:    Social History     Socioeconomic History     Marital status:      Spouse name: June     Number of children: 2     Years of education: 16     Highest education level: Not on file   Occupational History     Occupation:      Comment: [employer]   Tobacco Use     Smoking status: Never Smoker     Smokeless tobacco: Never Used     Tobacco comment: No smokers in home   Vaping Use     Vaping Use: Never used   Substance and Sexual Activity     Alcohol use: Yes     Comment: 3-4 drinks per night     Drug use: No     Comment: caffeine 3-4 daily     Sexual activity: Yes     Partners: Female     Birth control/protection: Surgical      Comment: vasectomy   Other Topics Concern      Service No     Blood Transfusions No     Caffeine Concern No     Comment: 6 cups/day     Occupational Exposure No     Hobby Hazards No     Comment: golfs, fishes, hunts     Sleep Concern Yes     Comment: doesn't sleep under stress well     Stress Concern Yes     Comment: work     Weight Concern Yes     Special Diet No     Back Care No     Exercise Yes     Comment: 3/week     Bike Helmet No     Seat Belt Yes     Self-Exams No     Parent/sibling w/ CABG, MI or angioplasty before 65F 55M? Not Asked   Social History Narrative     Not on file     Social Determinants of Health     Financial Resource Strain: Not on file   Food Insecurity: Not on file   Transportation Needs: Not on file   Physical Activity: Not on file   Stress: Not on file   Social Connections: Not on file   Intimate Partner Violence: Not on file   Housing Stability: Not on file       FAMILY HISTORY: Reviewed in EMR      REVIEW OF SYSTEMS: Positive for that noted in past medical history and history of present illness and otherwise reviewed in EMR     PHYSICAL EXAM:    Adult male in no acute distress. Articulates and communicates with normal affect.  Respirations even and unlabored  Focused upper extremity exam: Skin intact. No erythema. Sensation intact all dermatomes into the hand to light touch. EPL, FPL, and Intrinsics intact. Right shoulder active motion is FE to 100, ER at side to 15, and IR to L5. Left shoulder active motion is FE to 60, ER to 2, and IR to GT. No pain on palpation over the AC joint. Mild pain on palpation over the long head of the biceps.     IMAGING:  Left shoulder XRs 8/4/22 were reviewed and I agree with the Impression below:  IMPRESSION:  1.  Advanced left glenohumeral degenerative arthrosis, unchanged. This  includes advanced to joint space narrowing; glenoid and humeral head  remodeling; subchondral sclerosis; and marginal osteophytosis.  2.  Staple fixation of the lateral  humeral head, unchanged.  3.  Mild acromioclavicular arthrosis, stable.  4.  No fracture.    Left shoulder CT dated 8/26/22 was reviewed and I agree with the Impression below:                                                   Impression:  1. Severe glenohumeral joint osteoarthrosis with approximately 25  degrees of glenoid retroversion and marked deformity of the humeral  head.   a. Multiple ossified intra-articular bodies.       ASSESSMENT:    1. History of left shoulder instability  2. Status post left shoulder open instability  3. Left end stage post-instability arthropathy    PLAN:  We discussed the complex nature of his arthrosis with advanced glenoid bone loss and his history of instability. I reviewed the physical exam and imaging findings with the patient. We discussed that cortisone injections can relieve pain but likely would not significantly improve function. The patient is most interested in functional improvements. We discussed reasonable expectations of reverse TSA function and that this is the only surgical treatment at this time which offers a reasonable chance for functional improvement. He expressed understanding of this discussion. He would like to proceed with scheduling for left reverse TSA.    Iram La MD

## 2022-09-08 NOTE — NURSING NOTE
"Reason For Visit:   Chief Complaint   Patient presents with     Consult For     Left shoulder pain       PCP: Barak Cameron  Ref: Dr. Kelley    ?  No  Occupation Retired.  Currently working? No.  Work status?  Retired.  Date of injury: Gradual onset  Type of injury: Gradual onset.  Date of surgery: Left arthroscopic repair 1965; right arthroscopic repair 1963  Smoker: No  Request smoking cessation information: No    Left hand dominant    SANE score  Affected shoulder: Left  Right shoulder SANE: 99  Left shoulder SANE: 40    Ht 1.676 m (5' 6\")   Wt 98 kg (216 lb)   BMI 34.86 kg/m      Alyssa Pearce ATC  "

## 2022-09-08 NOTE — LETTER
"    9/8/2022         RE: Iam Kaur  [address] [facility] [address]        Dear Colleague,    Thank you for referring your patient, Iam Kaur, to the [facility]. Please see a copy of my visit note below.    CHIEF CONCERN:  Left shoulder pain    HISTORY OF PRESENT ILLNESS:  Mr. Kaur is a 77 year old LHD male I am seeing for left shoulder pain. He had a history of B shoulder instability. The left was always worse. He had surgery in '65 (L) and '63 (R). Shoulders were stable thereafter. He has had injections with his PCP but that didn't help.  Per Dr. Kelley on 8/4/22: \"He has a long history of left shoulder pain.  We have x-ray from 2006 showing significant osteoarthritis of the left shoulder.  He did have bilateral shoulder surgeries in high school for instability and has staples in the shoulder.  He now has aching pain rated 3 out of 10.  Worse with use.  He has very little motion of the shoulder.  He has had steroid injection which has given some relief.  His last injection was 6/4/2021 into the subacromial space as my team felt his rotator cuff was disrupted so a subacromial space injection would get down into the joint.  It gave good relief indicating that his rotator cuff is disrupted.\"    Past Medical History:   Diagnosis Date     Chest pain, unspecified 06/22/1999    Atypical chest pain, felt to be stress related, negative GXT     Closed dislocation of acromioclavicular (joint) 1962 & 1990    Chronic dislocations of shoulder - two surgeries with good results     Irritable bowel syndrome      Malignant neoplasm of colon, unspecified site 2000    Colon cancer/ rectal, Dr. Tee     Pneumonia, organism unspecified(486) 1977    Hospitalized 3 to 4 days     Sleep apnea 11/09    severe, recommended CPAP       Past Surgical History:   Procedure Laterality Date     C EXCIS RECTAL LESION,TRANSANAL  05/09/2000    Transanal excision of rectal polyp, " tubulovillous adenoma with severe dysplasia     COLONOSCOPY  03/05/07    repeat in 3 yrs     COLONOSCOPY  06/09/10    repeat in 3 yrs     COLONOSCOPY  6/11/13    colonoscopy, polypectomy, recheck in 3 years     COLONOSCOPY N/A 9/14/2018    Procedure: COMBINED COLONOSCOPY, SINGLE OR MULTIPLE BIOPSY/POLYPECTOMY BY BIOPSY;;  Surgeon: Rosendo Delgado MD;  Location: [facility]     COLONOSCOPY N/A 8/24/2022    Procedure: COLONOSCOPY, FLEXIBLE, WITH LESION REMOVAL USING SNARE;  Surgeon: Armando Acharya DO;  Location: [facility]     COLONOSCOPY WITH CO2 INSUFFLATION N/A 9/14/2018    Procedure: COLONOSCOPY WITH CO2 INSUFFLATION;  Colonoscopy  Loose stools  BMI 37.17  Pharmacy: [facility] - Fax [phone]  Referred by Dr. Delgado;  Surgeon: Rosendo Delgado MD;  Location: [facility]     COLONOSCOPY WITH CO2 INSUFFLATION N/A 8/24/2022    Procedure: COLONOSCOPY, WITH CO2 INSUFFLATION;  Surgeon: Armando Acharya DO;  Location: [facility]     ESOPHAGOSCOPY, GASTROSCOPY, DUODENOSCOPY (EGD), COMBINED  11/30/2010    COMBINED ESOPHAGOSCOPY, GASTROSCOPY, DUODENOSCOPY (EGD), BIOPSY SINGLE OR MULTIPLE performed by SHAGUFTA ROLDAN at  [facility]     HC REMOVAL OF TONSILS,<11 Y/O      Unsure of age at time of surgery     HC REPAIR ROTATOR CUFF,ACUTE  1962, 1966    Bilateral shoulder surgery for chronic dislocation     INJECT EPIDURAL LUMBAR  10/14/2014    [facility]     REMOVAL OF SPERM DUCT(S)  1975    Vasectomy     [facility] COLONOSCOPY THRU STOMA, DIAGNOSTIC  2001       Current Outpatient Medications   Medication Sig Dispense Refill     alcohol swab prep pads Use to swab area of injection/shazia as directed. 100 each 3     ASPIRIN 81 MG OR TABS ONE DAILY 0 0     atorvastatin (LIPITOR) 10 MG tablet Take 1 tablet (10 mg) by mouth daily 90 tablet 3     bisacodyl (DULCOLAX) 5 MG EC tablet Take two (2) tablet at 4 pm the day before your procedure.  If your procedure is before 11 am, take two (2) additional tablets at 8  pm.  If your procedure is after 11 am, take two (2) additional tablets at 6 am. For additional instructions refer to your colonoscopy prep instructions. 4 tablet 0     blood glucose (NO BRAND SPECIFIED) test strip Use to test blood sugar 3 times daily or as directed. To accompany: Blood Glucose Monitor Brands: per insurance. 100 strip 6     blood glucose calibration (NO BRAND SPECIFIED) solution To accompany: Blood Glucose Monitor Brands: per insurance. 1 Bottle 3     blood glucose monitoring (NO BRAND SPECIFIED) meter device kit Use to test blood sugar 3 times daily or as directed. Blood Glucose Monitor Brands: per insurance. 1 kit 0     lisinopril (ZESTRIL) 10 MG tablet Take 1 tablet (10 mg) by mouth daily 90 tablet 3     metFORMIN (GLUCOPHAGE) 500 MG tablet Take 1 tablet (500 mg) by mouth 2 times daily (with meals) 180 tablet 2     multivitamin w/minerals (THERA-VIT-M) tablet Take 1 tablet by mouth daily       polyethylene glycol (GOLYTELY) 236 g suspension The night before the exam: at 6 pm start drinking an 8-ounce glass every 15 minutes until the jug is half empty (about 8 glasses). Day of exam: 6 hours before your exam check-in Drink the other half of the jug. You should finish the prep 4 hours before the exam. For additional instructions refer to your colonoscopy prep instructions. 4000 mL 0     thin (NO BRAND SPECIFIED) lancets Use to check glucose 3 times daily. To accompany: Blood Glucose Monitor Brands: per insurance. 100 each 6          Allergies   Allergen Reactions     Naproxen Itching       SOCIAL HISTORY:    Social History     Socioeconomic History     Marital status:      Spouse name: June     Number of children: 2     Years of education: 16     Highest education level: Not on file   Occupational History     Occupation:      Comment: [employer]   Tobacco Use     Smoking status: Never Smoker     Smokeless tobacco: Never Used     Tobacco comment: No smokers in home    Vaping Use     Vaping Use: Never used   Substance and Sexual Activity     Alcohol use: Yes     Comment: 3-4 drinks per night     Drug use: No     Comment: caffeine 3-4 daily     Sexual activity: Yes     Partners: Female     Birth control/protection: Surgical     Comment: vasectomy   Other Topics Concern      Service No     Blood Transfusions No     Caffeine Concern No     Comment: 6 cups/day     Occupational Exposure No     Hobby Hazards No     Comment: golfs, fishes, hunts     Sleep Concern Yes     Comment: doesn't sleep under stress well     Stress Concern Yes     Comment: work     Weight Concern Yes     Special Diet No     Back Care No     Exercise Yes     Comment: 3/week     Bike Helmet No     Seat Belt Yes     Self-Exams No     Parent/sibling w/ CABG, MI or angioplasty before 65F 55M? Not Asked   Social History Narrative     Not on file     Social Determinants of Health     Financial Resource Strain: Not on file   Food Insecurity: Not on file   Transportation Needs: Not on file   Physical Activity: Not on file   Stress: Not on file   Social Connections: Not on file   Intimate Partner Violence: Not on file   Housing Stability: Not on file       FAMILY HISTORY: Reviewed in EMR      REVIEW OF SYSTEMS: Positive for that noted in past medical history and history of present illness and otherwise reviewed in EMR     PHYSICAL EXAM:    Adult male in no acute distress. Articulates and communicates with normal affect.  Respirations even and unlabored  Focused upper extremity exam: Skin intact. No erythema. Sensation intact all dermatomes into the hand to light touch. EPL, FPL, and Intrinsics intact. Right shoulder active motion is FE to 100, ER at side to 15, and IR to L5. Left shoulder active motion is FE to 60, ER to 2, and IR to GT. No pain on palpation over the AC joint. Mild pain on palpation over the long head of the biceps.     IMAGING:  Left shoulder XRs 8/4/22 were reviewed and I agree with the  Impression below:  IMPRESSION:  1.  Advanced left glenohumeral degenerative arthrosis, unchanged. This  includes advanced to joint space narrowing; glenoid and humeral head  remodeling; subchondral sclerosis; and marginal osteophytosis.  2.  Staple fixation of the lateral humeral head, unchanged.  3.  Mild acromioclavicular arthrosis, stable.  4.  No fracture.    Left shoulder CT dated 8/26/22 was reviewed and I agree with the Impression below:                                                   Impression:  1. Severe glenohumeral joint osteoarthrosis with approximately 25  degrees of glenoid retroversion and marked deformity of the humeral  head.   a. Multiple ossified intra-articular bodies.       ASSESSMENT:    1. History of left shoulder instability  2. Status post left shoulder open instability  3. Left end stage post-instability arthropathy    PLAN:  We discussed the complex nature of his arthrosis with advanced glenoid bone loss and his history of instability. I reviewed the physical exam and imaging findings with the patient. We discussed that cortisone injections can relieve pain but likely would not significantly improve function. The patient is most interested in functional improvements. We discussed reasonable expectations of reverse TSA function and that this is the only surgical treatment at this time which offers a reasonable chance for functional improvement. He expressed understanding of this discussion. He would like to proceed with scheduling for left reverse TSA.    Iram La MD        Again, thank you for allowing me to participate in the care of your patient.        Sincerely,        Iram La MD

## 2022-09-14 ENCOUNTER — DOCUMENTATION ONLY (OUTPATIENT)
Dept: ORTHOPEDICS | Facility: CLINIC | Age: 77
End: 2022-09-14

## 2022-09-14 NOTE — PROGRESS NOTES
Patient has an appointment for a pre procedure covid swab and no orders. Please place future orders as needed.    Thank You,  Charito Waller MLT([facility])

## 2022-09-15 ENCOUNTER — OFFICE VISIT (OUTPATIENT)
Dept: FAMILY MEDICINE | Facility: OTHER | Age: 77
End: 2022-09-15
Payer: COMMERCIAL

## 2022-09-15 VITALS
TEMPERATURE: 97.9 F | OXYGEN SATURATION: 94 % | BODY MASS INDEX: 34.07 KG/M2 | SYSTOLIC BLOOD PRESSURE: 134 MMHG | HEIGHT: 66 IN | DIASTOLIC BLOOD PRESSURE: 80 MMHG | HEART RATE: 90 BPM | WEIGHT: 212 LBS

## 2022-09-15 DIAGNOSIS — E11.9 TYPE 2 DIABETES MELLITUS WITHOUT COMPLICATION, WITHOUT LONG-TERM CURRENT USE OF INSULIN (H): ICD-10-CM

## 2022-09-15 DIAGNOSIS — E78.5 HYPERLIPIDEMIA LDL GOAL <130: ICD-10-CM

## 2022-09-15 DIAGNOSIS — I10 ESSENTIAL HYPERTENSION WITH GOAL BLOOD PRESSURE LESS THAN 140/90: ICD-10-CM

## 2022-09-15 DIAGNOSIS — M19.012 PRIMARY OSTEOARTHRITIS OF LEFT SHOULDER: ICD-10-CM

## 2022-09-15 DIAGNOSIS — Z01.818 PRE-OP EXAM: Primary | ICD-10-CM

## 2022-09-15 DIAGNOSIS — I10 ESSENTIAL HYPERTENSION: ICD-10-CM

## 2022-09-15 DIAGNOSIS — C20 MALIGNANT NEOPLASM OF RECTUM (H): ICD-10-CM

## 2022-09-15 DIAGNOSIS — G47.33 OBSTRUCTIVE SLEEP APNEA SYNDROME: ICD-10-CM

## 2022-09-15 LAB
ERYTHROCYTE [DISTWIDTH] IN BLOOD BY AUTOMATED COUNT: 11.7 % (ref 10–15)
HCT VFR BLD AUTO: 40.3 % (ref 40–53)
HGB BLD-MCNC: 13.3 G/DL (ref 13.3–17.7)
MCH RBC QN AUTO: 32.4 PG (ref 26.5–33)
MCHC RBC AUTO-ENTMCNC: 33 G/DL (ref 31.5–36.5)
MCV RBC AUTO: 98 FL (ref 78–100)
PLATELET # BLD AUTO: 284 10E3/UL (ref 150–450)
RBC # BLD AUTO: 4.1 10E6/UL (ref 4.4–5.9)
WBC # BLD AUTO: 7.4 10E3/UL (ref 4–11)

## 2022-09-15 PROCEDURE — 93000 ELECTROCARDIOGRAM COMPLETE: CPT | Performed by: PHYSICIAN ASSISTANT

## 2022-09-15 PROCEDURE — 85027 COMPLETE CBC AUTOMATED: CPT | Performed by: PHYSICIAN ASSISTANT

## 2022-09-15 PROCEDURE — 99214 OFFICE O/P EST MOD 30 MIN: CPT | Performed by: PHYSICIAN ASSISTANT

## 2022-09-15 PROCEDURE — 36415 COLL VENOUS BLD VENIPUNCTURE: CPT | Performed by: PHYSICIAN ASSISTANT

## 2022-09-15 ASSESSMENT — PAIN SCALES - GENERAL: PAINLEVEL: NO PAIN (0)

## 2022-09-15 NOTE — PROGRESS NOTES
[facility]  [address]  [facility] [address]  Phone: [phone]  Primary Provider: Hang Cameron  Pre-op Performing Provider: HANG CAMERON      PREOPERATIVE EVALUATION:  Today's date: 9/15/2022    Iam Kaur is a 77 year old male who presents for a preoperative evaluation.    Surgical Information:  Surgery/Procedure: ARTHROPLASTY, LEFT SHOULDER, TOTAL, REVERSE  Surgery Location: [facility]  Surgeon: Dr. La  Surgery Date: 9/20/22  Time of Surgery: 3:20pm  Where patient plans to recover: At home with family  Fax number for surgical facility: Note does not need to be faxed, will be available electronically in Epic.    Type of Anesthesia Anticipated: General    Assessment & Plan     The proposed surgical procedure is considered INTERMEDIATE risk.      ICD-10-CM    1. Pre-op exam  Z01.818 EKG 12-lead complete w/read - Clinics     CBC with platelets     CBC with platelets   2. Primary osteoarthritis of left shoulder  M19.012    3. Type 2 diabetes mellitus without complication, without long-term current use of insulin (H)  E11.9 EKG 12-lead complete w/read - Clinics   4. Essential hypertension with goal blood pressure less than 140/90  I10 EKG 12-lead complete w/read - Clinics   5. Essential hypertension  I10    6. Hyperlipidemia LDL goal <130  E78.5    7. Malignant neoplasm of rectum (H)  C20    8. Obstructive sleep apnea syndrome  G47.33        Diabetes is under excellent control.   Stable EKG  Distant history of rectal cancer with recently stable colonoscopy.  Cleared for surgery.    Medication Instructions:  Hold aspirin 10 days prior to surgery and ibuprofen 7 days prior.  Hold metformin the morning of surgery.    RECOMMENDATION:  APPROVAL GIVEN to proceed with proposed procedure, without further diagnostic evaluation.    Subjective     HPI related to upcoming procedure: He has severe left shoulder  arthritis and has failed conservative treatments so will be undergoing a reverse total shoulder arthroplasty with Dr. La on 9/20/22.    Preop Questions 9/15/2022   1. Have you ever had a heart attack or stroke? No   2. Have you ever had surgery on your heart or blood vessels, such as a stent placement, a coronary artery bypass, or surgery on an artery in your head, neck, heart, or legs? No   3. Do you have chest pain with activity? No   4. Do you have a history of  heart failure? No   5. Do you currently have a cold, bronchitis or symptoms of other infection? No   6. Do you have a cough, shortness of breath, or wheezing? No   7. Do you or anyone in your family have previous history of blood clots? No   8. Do you or does anyone in your family have a serious bleeding problem such as prolonged bleeding following surgeries or cuts? No   9. Have you ever had problems with anemia or been told to take iron pills? No   10. Have you had any abnormal blood loss such as black, tarry or bloody stools? No   11. Have you ever had a blood transfusion? No   12. Are you willing to have a blood transfusion if it is medically needed before, during, or after your surgery? Yes   13. Have you or any of your relatives ever had problems with anesthesia? No   14. Do you have sleep apnea, excessive snoring or daytime drowsiness? YES - uses CPAP   14a. Do you have a CPAP machine? Yes   15. Do you have any artifical heart valves or other implanted medical devices like a pacemaker, defibrillator, or continuous glucose monitor? No   16. Do you have artificial joints? Yes- knee   17. Are you allergic to latex? No       Health Care Directive:  Patient has a Health Care Directive on file      Status of Chronic Conditions:  See problem list for active medical problems.  Problems all longstanding and stable, except as noted/documented.  See ROS for pertinent symptoms related to these conditions.      Review of Systems  CONSTITUTIONAL: NEGATIVE  for fever, chills, change in weight  INTEGUMENTARY/SKIN: NEGATIVE for worrisome rashes, moles or lesions  EYES: NEGATIVE for vision changes or irritation  ENT/MOUTH: NEGATIVE for ear, mouth and throat problems  RESP: NEGATIVE for significant cough or SOB  CV: NEGATIVE for chest pain, palpitations or peripheral edema  GI: NEGATIVE for nausea, abdominal pain, heartburn, or change in bowel habits  : NEGATIVE for frequency, dysuria, or hematuria  MUSCULOSKELETAL: +Left shoulder pain.  NEURO: NEGATIVE for weakness, dizziness or paresthesias  ENDOCRINE: NEGATIVE for temperature intolerance, skin/hair changes  HEME: NEGATIVE for bleeding problems  PSYCHIATRIC: NEGATIVE for changes in mood or affect    Patient Active Problem List    Diagnosis Date Noted     Nontraumatic complete tear of left rotator cuff 08/06/2022     Priority: Medium     Type 2 diabetes mellitus without complication, without long-term current use of insulin (H) 05/20/2019     Priority: Medium     Radiation proctitis 05/08/2019     Priority: Medium     Primary osteoarthritis of both shoulders 05/08/2019     Priority: Medium     Alcohol use 11/30/2016     Priority: Medium     Lumbosacral neuritis 09/28/2013     Priority: Medium     L5/S1 with moderate foraminal stenosis on the left        Facet arthritis of lumbar region 09/28/2013     Priority: Medium     Spondylosis of lumbar joint 04/22/2013     Priority: Medium     Trochanteric bursitis 04/22/2013     Priority: Medium     Pain in shoulder 04/22/2013     Priority: Medium     HTN, goal below 140/90 02/27/2013     Priority: Medium     Advanced directives, counseling/discussion 10/31/2012     Priority: Medium     Discussed advance care planning with patient; information given to patient to review. 10/31/2012          Impaired fasting glucose 05/04/2011     Priority: Medium     Essential hypertension 05/04/2011     Priority: Medium     Obesity 05/04/2011     Priority: Medium     Hyperlipidemia LDL goal  <130 05/04/2011     Priority: Medium     Sleep apnea      Priority: Medium     severe, recommended CPAP       Tear of medial cartilage or meniscus of knee, current 09/14/2009     Priority: Medium     Irritable bowel syndrome      Priority: Medium     Regular diarrhea       Malignant neoplasm of rectum (H) 08/07/2001     Priority: Medium      Past Medical History:   Diagnosis Date     Chest pain, unspecified 06/22/1999    Atypical chest pain, felt to be stress related, negative GXT     Closed dislocation of acromioclavicular (joint) 1962 & 1990    Chronic dislocations of shoulder - two surgeries with good results     Irritable bowel syndrome      Malignant neoplasm of colon, unspecified site 2000    Colon cancer/ rectal, Dr. Tee     Pneumonia, organism unspecified(486) 1977    Hospitalized 3 to 4 days     Sleep apnea 11/09    severe, recommended CPAP     Past Surgical History:   Procedure Laterality Date     C EXCIS RECTAL LESION,TRANSANAL  05/09/2000    Transanal excision of rectal polyp, tubulovillous adenoma with severe dysplasia     COLONOSCOPY  03/05/07    repeat in 3 yrs     COLONOSCOPY  06/09/10    repeat in 3 yrs     COLONOSCOPY  6/11/13    colonoscopy, polypectomy, recheck in 3 years     COLONOSCOPY N/A 9/14/2018    Procedure: COMBINED COLONOSCOPY, SINGLE OR MULTIPLE BIOPSY/POLYPECTOMY BY BIOPSY;;  Surgeon: Rosendo Delgado MD;  Location: [facility]     COLONOSCOPY N/A 8/24/2022    Procedure: COLONOSCOPY, FLEXIBLE, WITH LESION REMOVAL USING SNARE;  Surgeon: Armando Acharya DO;  Location: [facility]     COLONOSCOPY WITH CO2 INSUFFLATION N/A 9/14/2018    Procedure: COLONOSCOPY WITH CO2 INSUFFLATION;  Colonoscopy  Loose stools  BMI 37.17  Pharmacy: [facility] - Fax [phone]  Referred by Dr. Delgado;  Surgeon: Rosendo Delgado MD;  Location: [facility]     COLONOSCOPY WITH CO2 INSUFFLATION N/A 8/24/2022    Procedure: COLONOSCOPY, WITH CO2 INSUFFLATION;  Surgeon: Armando Acharya DO;   Location: [facility]     ESOPHAGOSCOPY, GASTROSCOPY, DUODENOSCOPY (EGD), COMBINED  11/30/2010    COMBINED ESOPHAGOSCOPY, GASTROSCOPY, DUODENOSCOPY (EGD), BIOPSY SINGLE OR MULTIPLE performed by SHAGUFTA ROLDAN at  [facility]     HC REMOVAL OF TONSILS,<11 Y/O      Unsure of age at time of surgery     HC REPAIR ROTATOR CUFF,ACUTE  1962, 1966    Bilateral shoulder surgery for chronic dislocation     INJECT EPIDURAL LUMBAR  10/14/2014    [facility]     REMOVAL OF SPERM DUCT(S)  1975    Vasectomy     [facility] COLONOSCOPY THRU STOMA, DIAGNOSTIC  2001     Current Outpatient Medications   Medication Sig Dispense Refill     atorvastatin (LIPITOR) 10 MG tablet Take 1 tablet (10 mg) by mouth daily 90 tablet 3     lisinopril (ZESTRIL) 10 MG tablet Take 1 tablet (10 mg) by mouth daily 90 tablet 3     metFORMIN (GLUCOPHAGE) 500 MG tablet Take 1 tablet (500 mg) by mouth 2 times daily (with meals) 180 tablet 2     alcohol swab prep pads Use to swab area of injection/shazia as directed. 100 each 3     ASPIRIN 81 MG OR TABS ONE DAILY 0 0     blood glucose (NO BRAND SPECIFIED) test strip Use to test blood sugar 3 times daily or as directed. To accompany: Blood Glucose Monitor Brands: per insurance. 100 strip 6     blood glucose calibration (NO BRAND SPECIFIED) solution To accompany: Blood Glucose Monitor Brands: per insurance. 1 Bottle 3     blood glucose monitoring (NO BRAND SPECIFIED) meter device kit Use to test blood sugar 3 times daily or as directed. Blood Glucose Monitor Brands: per insurance. 1 kit 0     multivitamin w/minerals (THERA-VIT-M) tablet Take 1 tablet by mouth daily       thin (NO BRAND SPECIFIED) lancets Use to check glucose 3 times daily. To accompany: Blood Glucose Monitor Brands: per insurance. 100 each 6       Allergies   Allergen Reactions     Naproxen Itching        Social History     Tobacco Use     Smoking status: Never Smoker     Smokeless tobacco: Never Used     Tobacco comment: No smokers in home  "  Substance Use Topics     Alcohol use: Yes     Comment: 3-4 drinks per night     History   Drug Use No     Comment: caffeine 3-4 daily         Objective     /80 (BP Location: Right arm, Patient Position: Sitting, Cuff Size: Adult Regular)   Pulse 90   Temp 97.9  F (36.6  C) (Temporal)   Ht 1.676 m (5' 6\")   Wt 96.2 kg (212 lb)   SpO2 94%   BMI 34.22 kg/m      Physical Exam    GENERAL APPEARANCE: healthy, alert and no distress     EYES: EOMI,  PERRL     HENT: ear canals and TM's normal and nose and mouth without ulcers or lesions     NECK: no adenopathy, no asymmetry, masses, or scars and thyroid normal to palpation     RESP: lungs clear to auscultation - no rales, rhonchi or wheezes     CV: regular rate and rhythm, normal S1 S2, no S3 or S4 and no murmur, click or rub     ABDOMEN:  soft, nontender, no HSM or masses and bowel sounds normal     MS: extremities normal- no gross deformities noted, no evidence of inflammation in joints, FROM in all extremities with the exception of left shoulder extension which is limited.     SKIN: no suspicious lesions or rashes     NEURO: Normal strength and tone, sensory exam grossly normal, mentation intact and speech normal. Gait is stable.      PSYCH: mentation appears normal. and affect normal/bright     LYMPHATICS: No cervical adenopathy    Recent Labs   Lab Test 07/13/22  1413 10/20/21  1443    139   POTASSIUM 4.1 4.2   CR 0.84 0.90   A1C 5.8* 5.4        Diagnostics:  Results for orders placed or performed in visit on 09/15/22   CBC with platelets     Status: Abnormal   Result Value Ref Range    WBC Count 7.4 4.0 - 11.0 10e3/uL    RBC Count 4.10 (L) 4.40 - 5.90 10e6/uL    Hemoglobin 13.3 13.3 - 17.7 g/dL    Hematocrit 40.3 40.0 - 53.0 %    MCV 98 78 - 100 fL    MCH 32.4 26.5 - 33.0 pg    MCHC 33.0 31.5 - 36.5 g/dL    RDW 11.7 10.0 - 15.0 %    Platelet Count 284 150 - 450 10e3/uL       EKG: normal axis, normal intervals, no acute ST/T changes c/w ischemia, no " LVH by voltage criteria, Right Bundle Branch Block, unchanged from previous tracings     Revised Cardiac Risk Index (RCRI):  The patient has the following serious cardiovascular risks for perioperative complications:   - No serious cardiac risks = 0 points     RCRI Interpretation: 0 points: Class I (very low risk - 0.4% complication rate)       Signed Electronically by: Barak Cameron PA-C  Copy of this evaluation report is provided to requesting physician.

## 2022-09-15 NOTE — PATIENT INSTRUCTIONS
Hold metformin the day of surgery.    Hold ibuprofen until after surgery.  Preparing for Your Surgery  Getting started  A nurse will call you to review your health history and instructions. They will give you an arrival time based on your scheduled surgery time. Please be ready to share:  Your doctor's clinic name and phone number  Your medical, surgical and anesthesia history  A list of allergies and sensitivities  A list of medicines, including herbal treatments and over-the-counter drugs  Whether the patient has a legal guardian (ask how to send us the papers in advance)  Please tell us if you're pregnant--or if there's any chance you might be pregnant. Some surgeries may injure a fetus (unborn baby), so they require a pregnancy test. Surgeries that are safe for a fetus don't always need a test, and you can choose whether to have one.   If you have a child who's having surgery, please ask for a copy of Preparing for Your Child's Surgery.    Preparing for surgery  Within 30 days of surgery: Have a pre-op exam (sometimes called an H&P, or History and Physical). This can be done at a clinic or pre-operative center.  If you're having a , you may not need this exam. Talk to your care team.  At your pre-op exam, talk to your care team about all medicines you take. If you need to stop any medicines before surgery, ask when to start taking them again.  We do this for your safety. Many medicines can make you bleed too much during surgery. Some change how well surgery (anesthesia) drugs work.  Call your insurance company to let them know you're having surgery. (If you don't have insurance, call [phone].)  Call your clinic if there's any change in your health. This includes signs of a cold or flu (sore throat, runny nose, cough, rash, fever). It also includes a scrape or scratch near the surgery site.  If you have questions on the day of surgery, call your hospital or surgery center.  COVID testing  You may  need to be tested for COVID-19 before having surgery. If so, we will give you instructions.  Eating and drinking guidelines  For your safety: Unless your surgeon tells you otherwise, follow the guidelines below.  Eat and drink as usual until 8 hours before surgery. After that, no food or milk.  Drink clear liquids until 2 hours before surgery. These are liquids you can see through, like water, Gatorade and Propel Water. You may also have black coffee and tea (no cream or milk).  Nothing by mouth within 2 hours of surgery. This includes gum, candy and breath mints.  If you drink alcohol: Stop drinking it the night before surgery.  If your care team tells you to take medicine on the morning of surgery, it's okay to take it with a sip of water.  Preventing infection  Shower or bathe the night before and morning of your surgery. Follow the instructions your clinic gave you. (If no instructions, use regular soap.)  Don't shave or clip hair near your surgery site. We'll remove the hair if needed.  Don't smoke or vape the morning of surgery. You may chew nicotine gum up to 2 hours before surgery. A nicotine patch is okay.  Note: Some surgeries require you to completely quit smoking and nicotine. Check with your surgeon.  Your care team will make every effort to keep you safe from infection. We will:  Clean our hands often with soap and water (or an alcohol-based hand rub).  Clean the skin at your surgery site with a special soap that kills germs.  Give you a special gown to keep you warm. (Cold raises the risk of infection.)  Wear special hair covers, masks, gowns and gloves during surgery.  Give antibiotic medicine, if prescribed. Not all surgeries need antibiotics.  What to bring on the day of surgery  Photo ID and insurance card  Copy of your health care directive, if you have one  Glasses and hearing aides (bring cases)  You can't wear contacts during surgery  Inhaler and eye drops, if you use them (tell us about these  when you arrive)  CPAP machine or breathing device, if you use them  A few personal items, if spending the night  If you have . . .  A pacemaker, ICD (cardiac defibrillator) or other implant: Bring the ID card.  An implanted stimulator: Bring the remote control.  A legal guardian: Bring a copy of the certified (court-stamped) guardianship papers.  Please remove any jewelry, including body piercings. Leave jewelry and other valuables at home.  If you're going home the day of surgery  You must have a responsible adult drive you home. They should stay with you overnight as well.  If you don't have someone to stay with you, and you aren't safe to go home alone, we may keep you overnight. Insurance often won't pay for this.  After surgery  If it's hard to control your pain or you need more pain medicine, please call your surgeon's office.  Questions?   If you have any questions for your care team, list them here: _________________________________________________________________________________________________________________________________________________________________________ ____________________________________ ____________________________________ ____________________________________  For informational purposes only. Not to replace the advice of your health care provider. Copyright   2003, 2019 [facility]. All rights reserved. Clinically reviewed by Chrissy Antunez MD. [employer] [id number] - REV 07/21.

## 2022-09-16 ENCOUNTER — LAB (OUTPATIENT)
Dept: LAB | Facility: OTHER | Age: 77
End: 2022-09-16
Payer: COMMERCIAL

## 2022-09-16 DIAGNOSIS — Z20.822 ENCOUNTER FOR LABORATORY TESTING FOR COVID-19 VIRUS: ICD-10-CM

## 2022-09-16 LAB — SARS-COV-2 RNA RESP QL NAA+PROBE: NEGATIVE

## 2022-09-16 PROCEDURE — U0005 INFEC AGEN DETEC AMPLI PROBE: HCPCS

## 2022-09-16 PROCEDURE — U0003 INFECTIOUS AGENT DETECTION BY NUCLEIC ACID (DNA OR RNA); SEVERE ACUTE RESPIRATORY SYNDROME CORONAVIRUS 2 (SARS-COV-2) (CORONAVIRUS DISEASE [COVID-19]), AMPLIFIED PROBE TECHNIQUE, MAKING USE OF HIGH THROUGHPUT TECHNOLOGIES AS DESCRIBED BY CMS-2020-01-R: HCPCS

## 2022-09-19 ENCOUNTER — ANESTHESIA EVENT (OUTPATIENT)
Dept: SURGERY | Facility: CLINIC | Age: 77
End: 2022-09-19
Payer: COMMERCIAL

## 2022-09-20 ENCOUNTER — ANESTHESIA (OUTPATIENT)
Dept: SURGERY | Facility: CLINIC | Age: 77
End: 2022-09-20
Payer: COMMERCIAL

## 2022-09-20 ENCOUNTER — HOSPITAL ENCOUNTER (OUTPATIENT)
Facility: CLINIC | Age: 77
LOS: 1 days | Discharge: HOME | End: 2022-09-22
Attending: ORTHOPAEDIC SURGERY | Admitting: ORTHOPAEDIC SURGERY
Payer: COMMERCIAL

## 2022-09-20 ENCOUNTER — APPOINTMENT (OUTPATIENT)
Dept: GENERAL RADIOLOGY | Facility: CLINIC | Age: 77
End: 2022-09-20
Attending: ORTHOPAEDIC SURGERY
Payer: COMMERCIAL

## 2022-09-20 DIAGNOSIS — M19.012 PRIMARY OSTEOARTHRITIS OF BOTH SHOULDERS: Primary | ICD-10-CM

## 2022-09-20 DIAGNOSIS — M19.012 PRIMARY OSTEOARTHRITIS OF LEFT SHOULDER: ICD-10-CM

## 2022-09-20 DIAGNOSIS — M19.011 PRIMARY OSTEOARTHRITIS OF BOTH SHOULDERS: Primary | ICD-10-CM

## 2022-09-20 LAB
CREAT SERPL-MCNC: 0.76 MG/DL (ref 0.66–1.25)
GFR SERPL CREATININE-BSD FRML MDRD: >90 ML/MIN/1.73M2
GLUCOSE BLDC GLUCOMTR-MCNC: 129 MG/DL (ref 70–99)
GLUCOSE BLDC GLUCOMTR-MCNC: 138 MG/DL (ref 70–99)
POTASSIUM BLD-SCNC: 4.1 MMOL/L (ref 3.4–5.3)

## 2022-09-20 PROCEDURE — 272N000002 HC OR SUPPLY OTHER OPNP: Performed by: ORTHOPAEDIC SURGERY

## 2022-09-20 PROCEDURE — 250N000013 HC RX MED GY IP 250 OP 250 PS 637: Performed by: STUDENT IN AN ORGANIZED HEALTH CARE EDUCATION/TRAINING PROGRAM

## 2022-09-20 PROCEDURE — 20680 REMOVAL OF IMPLANT DEEP: CPT | Mod: 51 | Performed by: ORTHOPAEDIC SURGERY

## 2022-09-20 PROCEDURE — 250N000011 HC RX IP 250 OP 636: Performed by: ORTHOPAEDIC SURGERY

## 2022-09-20 PROCEDURE — C1776 JOINT DEVICE (IMPLANTABLE): HCPCS | Performed by: ORTHOPAEDIC SURGERY

## 2022-09-20 PROCEDURE — 23472 RECONSTRUCT SHOULDER JOINT: CPT | Mod: LT | Performed by: ORTHOPAEDIC SURGERY

## 2022-09-20 PROCEDURE — 250N000025 HC SEVOFLURANE, PER MIN: Performed by: ORTHOPAEDIC SURGERY

## 2022-09-20 PROCEDURE — 250N000009 HC RX 250: Performed by: ORTHOPAEDIC SURGERY

## 2022-09-20 PROCEDURE — 272N000001 HC OR GENERAL SUPPLY STERILE: Performed by: ORTHOPAEDIC SURGERY

## 2022-09-20 PROCEDURE — 258N000001 HC RX 258: Performed by: ORTHOPAEDIC SURGERY

## 2022-09-20 PROCEDURE — 250N000013 HC RX MED GY IP 250 OP 250 PS 637: Performed by: ORTHOPAEDIC SURGERY

## 2022-09-20 PROCEDURE — 360N000077 HC SURGERY LEVEL 4, PER MIN: Performed by: ORTHOPAEDIC SURGERY

## 2022-09-20 PROCEDURE — 250N000009 HC RX 250: Performed by: STUDENT IN AN ORGANIZED HEALTH CARE EDUCATION/TRAINING PROGRAM

## 2022-09-20 PROCEDURE — 250N000011 HC RX IP 250 OP 636: Performed by: NURSE ANESTHETIST, CERTIFIED REGISTERED

## 2022-09-20 PROCEDURE — 258N000003 HC RX IP 258 OP 636: Performed by: NURSE ANESTHETIST, CERTIFIED REGISTERED

## 2022-09-20 PROCEDURE — 258N000003 HC RX IP 258 OP 636: Performed by: ORTHOPAEDIC SURGERY

## 2022-09-20 PROCEDURE — 250N000013 HC RX MED GY IP 250 OP 250 PS 637: Performed by: ANESTHESIOLOGY

## 2022-09-20 PROCEDURE — 271N000001 HC OR GENERAL SUPPLY NON-STERILE: Performed by: ORTHOPAEDIC SURGERY

## 2022-09-20 PROCEDURE — C1713 ANCHOR/SCREW BN/BN,TIS/BN: HCPCS | Performed by: ORTHOPAEDIC SURGERY

## 2022-09-20 PROCEDURE — 250N000011 HC RX IP 250 OP 636: Performed by: STUDENT IN AN ORGANIZED HEALTH CARE EDUCATION/TRAINING PROGRAM

## 2022-09-20 PROCEDURE — 250N000009 HC RX 250: Performed by: NURSE ANESTHETIST, CERTIFIED REGISTERED

## 2022-09-20 PROCEDURE — 84132 ASSAY OF SERUM POTASSIUM: CPT | Performed by: ANESTHESIOLOGY

## 2022-09-20 PROCEDURE — 370N000017 HC ANESTHESIA TECHNICAL FEE, PER MIN: Performed by: ORTHOPAEDIC SURGERY

## 2022-09-20 PROCEDURE — 999N000141 HC STATISTIC PRE-PROCEDURE NURSING ASSESSMENT: Performed by: ORTHOPAEDIC SURGERY

## 2022-09-20 PROCEDURE — 710N000010 HC RECOVERY PHASE 1, LEVEL 2, PER MIN: Performed by: ORTHOPAEDIC SURGERY

## 2022-09-20 PROCEDURE — 999N000065 XR SHOULDER LEFT PORT G/E 2 VIEWS: Mod: LT

## 2022-09-20 PROCEDURE — C9290 INJ, BUPIVACAINE LIPOSOME: HCPCS | Performed by: STUDENT IN AN ORGANIZED HEALTH CARE EDUCATION/TRAINING PROGRAM

## 2022-09-20 PROCEDURE — 36415 COLL VENOUS BLD VENIPUNCTURE: CPT | Performed by: ANESTHESIOLOGY

## 2022-09-20 PROCEDURE — 82565 ASSAY OF CREATININE: CPT | Performed by: ANESTHESIOLOGY

## 2022-09-20 DEVICE — IMPLANTABLE DEVICE
Type: IMPLANTABLE DEVICE | Site: SHOULDER | Status: FUNCTIONAL
Brand: COMPREHENSIVE® VERSA-DIAL®

## 2022-09-20 DEVICE — IMPLANTABLE DEVICE
Type: IMPLANTABLE DEVICE | Site: SHOULDER | Status: FUNCTIONAL
Brand: COMPREHENSIVE SHOULDER SYSTEM

## 2022-09-20 DEVICE — IMPLANTABLE DEVICE
Type: IMPLANTABLE DEVICE | Site: SHOULDER | Status: FUNCTIONAL
Brand: COMPREHENSIVE® REVERSE SHOULDER

## 2022-09-20 DEVICE — IMPLANTABLE DEVICE
Type: IMPLANTABLE DEVICE | Site: SHOULDER | Status: FUNCTIONAL
Brand: COMPREHENSIVE® PROLONG®

## 2022-09-20 DEVICE — IMPLANTABLE DEVICE
Type: IMPLANTABLE DEVICE | Site: SHOULDER | Status: FUNCTIONAL
Brand: COMPREHENSIVE®

## 2022-09-20 RX ORDER — LIDOCAINE 40 MG/G
CREAM TOPICAL
Status: DISCONTINUED | OUTPATIENT
Start: 2022-09-20 | End: 2022-09-22 | Stop reason: HOSPADM

## 2022-09-20 RX ORDER — FENTANYL CITRATE 50 UG/ML
25 INJECTION, SOLUTION INTRAMUSCULAR; INTRAVENOUS EVERY 5 MIN PRN
Status: DISCONTINUED | OUTPATIENT
Start: 2022-09-20 | End: 2022-09-20 | Stop reason: HOSPADM

## 2022-09-20 RX ORDER — ONDANSETRON 2 MG/ML
INJECTION INTRAMUSCULAR; INTRAVENOUS PRN
Status: DISCONTINUED | OUTPATIENT
Start: 2022-09-20 | End: 2022-09-20

## 2022-09-20 RX ORDER — HYDROMORPHONE HYDROCHLORIDE 1 MG/ML
0.2 INJECTION, SOLUTION INTRAMUSCULAR; INTRAVENOUS; SUBCUTANEOUS EVERY 5 MIN PRN
Status: DISCONTINUED | OUTPATIENT
Start: 2022-09-20 | End: 2022-09-20 | Stop reason: HOSPADM

## 2022-09-20 RX ORDER — OXYCODONE HYDROCHLORIDE 5 MG/1
5 TABLET ORAL EVERY 4 HOURS PRN
Status: DISCONTINUED | OUTPATIENT
Start: 2022-09-20 | End: 2022-09-20 | Stop reason: HOSPADM

## 2022-09-20 RX ORDER — HYDROMORPHONE HCL IN WATER/PF 6 MG/30 ML
0.4 PATIENT CONTROLLED ANALGESIA SYRINGE INTRAVENOUS
Status: DISCONTINUED | OUTPATIENT
Start: 2022-09-20 | End: 2022-09-22 | Stop reason: HOSPADM

## 2022-09-20 RX ORDER — ASPIRIN 81 MG/1
162 TABLET ORAL DAILY
Status: DISCONTINUED | OUTPATIENT
Start: 2022-09-20 | End: 2022-09-22 | Stop reason: HOSPADM

## 2022-09-20 RX ORDER — VANCOMYCIN HYDROCHLORIDE 1 G/20ML
INJECTION, POWDER, LYOPHILIZED, FOR SOLUTION INTRAVENOUS PRN
Status: DISCONTINUED | OUTPATIENT
Start: 2022-09-20 | End: 2022-09-20 | Stop reason: HOSPADM

## 2022-09-20 RX ORDER — MEPERIDINE HYDROCHLORIDE 25 MG/ML
12.5 INJECTION INTRAMUSCULAR; INTRAVENOUS; SUBCUTANEOUS
Status: DISCONTINUED | OUTPATIENT
Start: 2022-09-20 | End: 2022-09-20 | Stop reason: HOSPADM

## 2022-09-20 RX ORDER — CEFAZOLIN SODIUM/WATER 2 G/20 ML
2 SYRINGE (ML) INTRAVENOUS
Status: COMPLETED | OUTPATIENT
Start: 2022-09-20 | End: 2022-09-20

## 2022-09-20 RX ORDER — AMOXICILLIN 250 MG
1 CAPSULE ORAL 2 TIMES DAILY
Status: DISCONTINUED | OUTPATIENT
Start: 2022-09-20 | End: 2022-09-22 | Stop reason: HOSPADM

## 2022-09-20 RX ORDER — KETAMINE HYDROCHLORIDE 10 MG/ML
INJECTION INTRAMUSCULAR; INTRAVENOUS PRN
Status: DISCONTINUED | OUTPATIENT
Start: 2022-09-20 | End: 2022-09-20

## 2022-09-20 RX ORDER — FENTANYL CITRATE 50 UG/ML
25 INJECTION, SOLUTION INTRAMUSCULAR; INTRAVENOUS
Status: DISCONTINUED | OUTPATIENT
Start: 2022-09-20 | End: 2022-09-20 | Stop reason: HOSPADM

## 2022-09-20 RX ORDER — BISACODYL 10 MG
10 SUPPOSITORY, RECTAL RECTAL DAILY PRN
Status: DISCONTINUED | OUTPATIENT
Start: 2022-09-20 | End: 2022-09-22 | Stop reason: HOSPADM

## 2022-09-20 RX ORDER — TRANEXAMIC ACID 650 MG/1
1950 TABLET ORAL ONCE
Status: COMPLETED | OUTPATIENT
Start: 2022-09-20 | End: 2022-09-20

## 2022-09-20 RX ORDER — HYDROMORPHONE HCL IN WATER/PF 6 MG/30 ML
0.2 PATIENT CONTROLLED ANALGESIA SYRINGE INTRAVENOUS
Status: DISCONTINUED | OUTPATIENT
Start: 2022-09-20 | End: 2022-09-22 | Stop reason: HOSPADM

## 2022-09-20 RX ORDER — FENTANYL CITRATE 50 UG/ML
50 INJECTION, SOLUTION INTRAMUSCULAR; INTRAVENOUS
Status: DISCONTINUED | OUTPATIENT
Start: 2022-09-20 | End: 2022-09-20 | Stop reason: HOSPADM

## 2022-09-20 RX ORDER — ONDANSETRON 4 MG/1
4 TABLET, ORALLY DISINTEGRATING ORAL EVERY 6 HOURS PRN
Status: DISCONTINUED | OUTPATIENT
Start: 2022-09-20 | End: 2022-09-22 | Stop reason: HOSPADM

## 2022-09-20 RX ORDER — ACETAMINOPHEN 325 MG/1
975 TABLET ORAL EVERY 8 HOURS
Status: DISCONTINUED | OUTPATIENT
Start: 2022-09-20 | End: 2022-09-22 | Stop reason: HOSPADM

## 2022-09-20 RX ORDER — FENTANYL CITRATE 50 UG/ML
INJECTION, SOLUTION INTRAMUSCULAR; INTRAVENOUS PRN
Status: DISCONTINUED | OUTPATIENT
Start: 2022-09-20 | End: 2022-09-20

## 2022-09-20 RX ORDER — OXYCODONE HYDROCHLORIDE 5 MG/1
5 TABLET ORAL EVERY 4 HOURS PRN
Status: DISCONTINUED | OUTPATIENT
Start: 2022-09-20 | End: 2022-09-22 | Stop reason: HOSPADM

## 2022-09-20 RX ORDER — PROPOFOL 10 MG/ML
INJECTION, EMULSION INTRAVENOUS PRN
Status: DISCONTINUED | OUTPATIENT
Start: 2022-09-20 | End: 2022-09-20

## 2022-09-20 RX ORDER — POLYETHYLENE GLYCOL 3350 17 G/17G
17 POWDER, FOR SOLUTION ORAL DAILY
Status: DISCONTINUED | OUTPATIENT
Start: 2022-09-21 | End: 2022-09-22 | Stop reason: HOSPADM

## 2022-09-20 RX ORDER — SODIUM CHLORIDE, SODIUM LACTATE, POTASSIUM CHLORIDE, CALCIUM CHLORIDE 600; 310; 30; 20 MG/100ML; MG/100ML; MG/100ML; MG/100ML
INJECTION, SOLUTION INTRAVENOUS CONTINUOUS
Status: DISCONTINUED | OUTPATIENT
Start: 2022-09-20 | End: 2022-09-21

## 2022-09-20 RX ORDER — MAGNESIUM SULFATE HEPTAHYDRATE 40 MG/ML
INJECTION, SOLUTION INTRAVENOUS PRN
Status: DISCONTINUED | OUTPATIENT
Start: 2022-09-20 | End: 2022-09-20

## 2022-09-20 RX ORDER — LIDOCAINE 40 MG/G
CREAM TOPICAL
Status: DISCONTINUED | OUTPATIENT
Start: 2022-09-20 | End: 2022-09-20 | Stop reason: HOSPADM

## 2022-09-20 RX ORDER — LABETALOL HYDROCHLORIDE 5 MG/ML
10 INJECTION, SOLUTION INTRAVENOUS
Status: DISCONTINUED | OUTPATIENT
Start: 2022-09-20 | End: 2022-09-20 | Stop reason: HOSPADM

## 2022-09-20 RX ORDER — NALOXONE HYDROCHLORIDE 0.4 MG/ML
0.2 INJECTION, SOLUTION INTRAMUSCULAR; INTRAVENOUS; SUBCUTANEOUS
Status: DISCONTINUED | OUTPATIENT
Start: 2022-09-20 | End: 2022-09-22 | Stop reason: HOSPADM

## 2022-09-20 RX ORDER — SODIUM CHLORIDE, SODIUM LACTATE, POTASSIUM CHLORIDE, CALCIUM CHLORIDE 600; 310; 30; 20 MG/100ML; MG/100ML; MG/100ML; MG/100ML
INJECTION, SOLUTION INTRAVENOUS CONTINUOUS PRN
Status: DISCONTINUED | OUTPATIENT
Start: 2022-09-20 | End: 2022-09-20

## 2022-09-20 RX ORDER — DEXAMETHASONE SODIUM PHOSPHATE 4 MG/ML
INJECTION, SOLUTION INTRA-ARTICULAR; INTRALESIONAL; INTRAMUSCULAR; INTRAVENOUS; SOFT TISSUE PRN
Status: DISCONTINUED | OUTPATIENT
Start: 2022-09-20 | End: 2022-09-20

## 2022-09-20 RX ORDER — NALOXONE HYDROCHLORIDE 0.4 MG/ML
0.4 INJECTION, SOLUTION INTRAMUSCULAR; INTRAVENOUS; SUBCUTANEOUS
Status: DISCONTINUED | OUTPATIENT
Start: 2022-09-20 | End: 2022-09-22 | Stop reason: HOSPADM

## 2022-09-20 RX ORDER — ONDANSETRON 4 MG/1
4 TABLET, ORALLY DISINTEGRATING ORAL EVERY 30 MIN PRN
Status: DISCONTINUED | OUTPATIENT
Start: 2022-09-20 | End: 2022-09-20 | Stop reason: HOSPADM

## 2022-09-20 RX ORDER — ONDANSETRON 2 MG/ML
4 INJECTION INTRAMUSCULAR; INTRAVENOUS EVERY 6 HOURS PRN
Status: DISCONTINUED | OUTPATIENT
Start: 2022-09-20 | End: 2022-09-22 | Stop reason: HOSPADM

## 2022-09-20 RX ORDER — LIDOCAINE HYDROCHLORIDE 20 MG/ML
INJECTION, SOLUTION INFILTRATION; PERINEURAL PRN
Status: DISCONTINUED | OUTPATIENT
Start: 2022-09-20 | End: 2022-09-20

## 2022-09-20 RX ORDER — ONDANSETRON 2 MG/ML
4 INJECTION INTRAMUSCULAR; INTRAVENOUS EVERY 30 MIN PRN
Status: DISCONTINUED | OUTPATIENT
Start: 2022-09-20 | End: 2022-09-20 | Stop reason: HOSPADM

## 2022-09-20 RX ORDER — CEFAZOLIN SODIUM 2 G/100ML
2 INJECTION, SOLUTION INTRAVENOUS EVERY 8 HOURS
Status: COMPLETED | OUTPATIENT
Start: 2022-09-20 | End: 2022-09-21

## 2022-09-20 RX ORDER — ACETAMINOPHEN 325 MG/1
650 TABLET ORAL EVERY 4 HOURS PRN
Status: DISCONTINUED | OUTPATIENT
Start: 2022-09-23 | End: 2022-09-22 | Stop reason: HOSPADM

## 2022-09-20 RX ORDER — PROCHLORPERAZINE MALEATE 5 MG
5 TABLET ORAL EVERY 6 HOURS PRN
Status: DISCONTINUED | OUTPATIENT
Start: 2022-09-20 | End: 2022-09-22 | Stop reason: HOSPADM

## 2022-09-20 RX ORDER — CEFAZOLIN SODIUM/WATER 2 G/20 ML
2 SYRINGE (ML) INTRAVENOUS SEE ADMIN INSTRUCTIONS
Status: DISCONTINUED | OUTPATIENT
Start: 2022-09-20 | End: 2022-09-20 | Stop reason: HOSPADM

## 2022-09-20 RX ORDER — SODIUM CHLORIDE, SODIUM LACTATE, POTASSIUM CHLORIDE, CALCIUM CHLORIDE 600; 310; 30; 20 MG/100ML; MG/100ML; MG/100ML; MG/100ML
INJECTION, SOLUTION INTRAVENOUS CONTINUOUS
Status: DISCONTINUED | OUTPATIENT
Start: 2022-09-20 | End: 2022-09-20 | Stop reason: HOSPADM

## 2022-09-20 RX ORDER — OXYCODONE HYDROCHLORIDE 10 MG/1
10 TABLET ORAL EVERY 4 HOURS PRN
Status: DISCONTINUED | OUTPATIENT
Start: 2022-09-20 | End: 2022-09-22 | Stop reason: HOSPADM

## 2022-09-20 RX ORDER — BUPIVACAINE HYDROCHLORIDE 2.5 MG/ML
INJECTION, SOLUTION EPIDURAL; INFILTRATION; INTRACAUDAL
Status: COMPLETED | OUTPATIENT
Start: 2022-09-20 | End: 2022-09-20

## 2022-09-20 RX ORDER — ACETAMINOPHEN 325 MG/1
975 TABLET ORAL ONCE
Status: COMPLETED | OUTPATIENT
Start: 2022-09-20 | End: 2022-09-20

## 2022-09-20 RX ADMIN — DEXMEDETOMIDINE HYDROCHLORIDE 20 MCG: 100 INJECTION, SOLUTION INTRAVENOUS at 15:31

## 2022-09-20 RX ADMIN — HYDROMORPHONE HYDROCHLORIDE 0.5 MG: 1 INJECTION, SOLUTION INTRAMUSCULAR; INTRAVENOUS; SUBCUTANEOUS at 17:22

## 2022-09-20 RX ADMIN — HYDROMORPHONE HYDROCHLORIDE 0.5 MG: 1 INJECTION, SOLUTION INTRAMUSCULAR; INTRAVENOUS; SUBCUTANEOUS at 15:21

## 2022-09-20 RX ADMIN — Medication 30 MG: at 14:52

## 2022-09-20 RX ADMIN — SODIUM CHLORIDE, POTASSIUM CHLORIDE, SODIUM LACTATE AND CALCIUM CHLORIDE: 600; 310; 30; 20 INJECTION, SOLUTION INTRAVENOUS at 16:25

## 2022-09-20 RX ADMIN — SODIUM CHLORIDE, POTASSIUM CHLORIDE, SODIUM LACTATE AND CALCIUM CHLORIDE: 600; 310; 30; 20 INJECTION, SOLUTION INTRAVENOUS at 22:54

## 2022-09-20 RX ADMIN — TRANEXAMIC ACID 1950 MG: 650 TABLET ORAL at 13:37

## 2022-09-20 RX ADMIN — CEFAZOLIN SODIUM 2 G: 2 INJECTION, SOLUTION INTRAVENOUS at 22:54

## 2022-09-20 RX ADMIN — BUPIVACAINE HYDROCHLORIDE 5 ML: 2.5 INJECTION, SOLUTION EPIDURAL; INFILTRATION; INTRACAUDAL at 14:14

## 2022-09-20 RX ADMIN — Medication 2 G: at 14:43

## 2022-09-20 RX ADMIN — FENTANYL CITRATE 50 MCG: 50 INJECTION, SOLUTION INTRAMUSCULAR; INTRAVENOUS at 14:51

## 2022-09-20 RX ADMIN — PROPOFOL 30 MG: 10 INJECTION, EMULSION INTRAVENOUS at 15:00

## 2022-09-20 RX ADMIN — ASPIRIN 162 MG: 81 TABLET, COATED ORAL at 20:19

## 2022-09-20 RX ADMIN — ACETAMINOPHEN 975 MG: 325 TABLET, FILM COATED ORAL at 13:37

## 2022-09-20 RX ADMIN — FENTANYL CITRATE 25 MCG: 50 INJECTION, SOLUTION INTRAMUSCULAR; INTRAVENOUS at 14:14

## 2022-09-20 RX ADMIN — BUPIVACAINE 10 ML: 13.3 INJECTION, SUSPENSION, LIPOSOMAL INFILTRATION at 14:14

## 2022-09-20 RX ADMIN — ACETAMINOPHEN 975 MG: 325 TABLET, FILM COATED ORAL at 20:19

## 2022-09-20 RX ADMIN — Medication 10 MG: at 16:44

## 2022-09-20 RX ADMIN — PHENYLEPHRINE HYDROCHLORIDE 100 MCG: 10 INJECTION INTRAVENOUS at 16:04

## 2022-09-20 RX ADMIN — PROPOFOL 30 MG: 10 INJECTION, EMULSION INTRAVENOUS at 15:04

## 2022-09-20 RX ADMIN — SODIUM CHLORIDE, POTASSIUM CHLORIDE, SODIUM LACTATE AND CALCIUM CHLORIDE: 600; 310; 30; 20 INJECTION, SOLUTION INTRAVENOUS at 14:43

## 2022-09-20 RX ADMIN — DEXAMETHASONE SODIUM PHOSPHATE 8 MG: 4 INJECTION, SOLUTION INTRAMUSCULAR; INTRAVENOUS at 15:14

## 2022-09-20 RX ADMIN — PHENYLEPHRINE HYDROCHLORIDE 0.25 MCG/KG/MIN: 10 INJECTION INTRAVENOUS at 15:55

## 2022-09-20 RX ADMIN — MAGNESIUM SULFATE HEPTAHYDRATE 2 G: 40 INJECTION, SOLUTION INTRAVENOUS at 15:04

## 2022-09-20 RX ADMIN — MIDAZOLAM HYDROCHLORIDE 0.5 MG: 1 INJECTION, SOLUTION INTRAMUSCULAR; INTRAVENOUS at 14:15

## 2022-09-20 RX ADMIN — Medication 10 MG: at 15:56

## 2022-09-20 RX ADMIN — SUCCINYLCHOLINE CHLORIDE 160 MG: 20 INJECTION, SOLUTION INTRAMUSCULAR; INTRAVENOUS; PARENTERAL at 14:51

## 2022-09-20 RX ADMIN — OXYCODONE HYDROCHLORIDE 5 MG: 5 TABLET ORAL at 18:30

## 2022-09-20 RX ADMIN — LIDOCAINE HYDROCHLORIDE 100 MG: 20 INJECTION, SOLUTION INFILTRATION; PERINEURAL at 14:51

## 2022-09-20 RX ADMIN — PROPOFOL 200 MG: 10 INJECTION, EMULSION INTRAVENOUS at 14:51

## 2022-09-20 RX ADMIN — SODIUM CHLORIDE, POTASSIUM CHLORIDE, SODIUM LACTATE AND CALCIUM CHLORIDE: 600; 310; 30; 20 INJECTION, SOLUTION INTRAVENOUS at 20:21

## 2022-09-20 RX ADMIN — ONDANSETRON 4 MG: 2 INJECTION INTRAMUSCULAR; INTRAVENOUS at 17:22

## 2022-09-20 ASSESSMENT — ACTIVITIES OF DAILY LIVING (ADL)
ADLS_ACUITY_SCORE: 25
ADLS_ACUITY_SCORE: 18

## 2022-09-20 NOTE — ANESTHESIA CARE TRANSFER NOTE
Patient: Iam Kaur    Procedure: Procedure(s):  ARTHROPLASTY, LEFT SHOULDER, TOTAL, REVERSE       Diagnosis: Primary osteoarthritis of left shoulder [M19.012]  Diagnosis Additional Information: No value filed.    Anesthesia Type:   General     Note:    Oropharynx: oropharynx clear of all foreign objects  Level of Consciousness: awake  Oxygen Supplementation: face mask  Level of Supplemental Oxygen (L/min / FiO2): 8  Independent Airway: airway patency satisfactory and stable  Dentition: dentition unchanged  Vital Signs Stable: post-procedure vital signs reviewed and stable  Report to RN Given: handoff report given  Patient transferred to: PACU  Comments: Regular respirations and patent airway. VSS. IV patent and infusing. Pt resting comfortably. Report given to RN    Handoff Report: Identifed the Patient, Identified the Reponsible Provider, Reviewed the pertinent medical history, Discussed the surgical course, Reviewed Intra-OP anesthesia mangement and issues during anesthesia, Set expectations for post-procedure period and Allowed opportunity for questions and acknowledgement of understanding      Vitals:  Vitals Value Taken Time   /94 09/20/22 1737   Temp     Pulse 84 09/20/22 1740   Resp 17 09/20/22 1740   SpO2 100    Vitals shown include unvalidated device data.    Electronically Signed By: ELIS Germain CRNA  September 20, 2022  5:41 PM

## 2022-09-20 NOTE — OR NURSING
PACU to Inpatient Nursing Handoff    Patient Iam Kaur is a 77 year old male who speaks English.   Procedure Procedure(s):  ARTHROPLASTY, LEFT SHOULDER, TOTAL, REVERSE   Surgeon(s) Primary: Iram La MD  Resident - Assisting: Savannah Gallegos MD     Allergies   Allergen Reactions     Naproxen Itching       Isolation  [unfilled]     Past Medical History   has a past medical history of Chest pain, unspecified (06/22/1999), Closed dislocation of acromioclavicular (joint) (1962 & 1990), Irritable bowel syndrome, Malignant neoplasm of colon, unspecified site (2000), Pneumonia, organism unspecified(486) (1977), and Sleep apnea (11/09).    Anesthesia Choice   Dermatome Level     Preop Meds acetaminophen (Tylenol) - time given: 975mg @ 1337   Nerve block Brachial plexus .  Location:left. Med:Exparel (liposomal bupivacaine). Time given: 1414   Intraop Meds dexmedetomidine (Precedex): 20 mcg total  fentanyl (Sublimaze): 50 mcg total  hydromorphone (Dilaudid): 1 mg total  ondansetron (Zofran): last given at 4mg @ 1722  ketamine 50mg   Local Meds No   Antibiotics cefazolin (Ancef) - last given at 1443     Pain Patient Currently in Pain: denies   PACU meds  oxycodone (Roxicodone): 5 mg (total dose) last given at 1830    PCA / epidural No   Capnography     Telemetry ECG Rhythm: Normal sinus rhythm;Bundle branch block   Inpatient Telemetry Monitor Ordered? No        Labs Glucose Lab Results   Component Value Date     09/20/2022     07/13/2022     08/05/2020       Hgb Lab Results   Component Value Date    HGB 13.3 09/15/2022    HGB 14.2 08/08/2018       INR No results found for: INR   PACU Imaging Completed     Wound/Incision Incision/Surgical Site 09/20/22 Left;Anterior Shoulder (Active)   Incision Assessment UTV 09/20/22 1737   Closure Camby 09/20/22 1737   Incision Drainage Amount None 09/20/22 1737   Dressing Intervention Clean, dry, intact 09/20/22 1737   Number of days: 0      CMS         Equipment ice pack and donjoy sling LUE/secure.   Other LDA       IV Access Peripheral IV 09/20/22 Right Lower forearm (Active)   Site Assessment WDL 09/20/22 1737   Line Status Infusing 09/20/22 1737   Dressing Intervention New dressing  09/20/22 1325   Phlebitis Scale 0-->no symptoms 09/20/22 1737   Infiltration Scale 1 09/20/22 1737   Number of days: 0      Blood Products Not applicable EBL Min. mL   Intake/Output Date 09/20/22 0700 - 09/21/22 0659   Shift 3562-5193 4677-0869 3250-2836 24 Hour Total   INTAKE   I.V.  1000  1000   Shift Total(mL/kg)  1000(10.44)  1000(10.44)   OUTPUT   Shift Total(mL/kg)       Weight (kg) 95.8 95.8 95.8 95.8      Drains / Link Closed/Suction Drain 1 Left;Anterior Shoulder Accordion 10 [place] (Active)   Site Description UTV 09/20/22 1737   Dressing Status Normal: Clean, Dry & Intact 09/20/22 1737   Drainage Appearance Bloody/Bright Red 09/20/22 1737   Status To bulb suction 09/20/22 1737   Number of days: 0      Time of void PreOp Void Prior to Procedure: 1345 (09/20/22 1403)    PostOp      Diapered? No   Bladder Scan     PO    Nothing by mouth at present.       Vitals    B/P: (!) 161/87  T: 98.1  F (36.7  C)    Temp src: Oral  P:  Pulse: 86 (09/20/22 1745)          R: 17  O2:  SpO2: 97 %    O2 Device: None (Room air) (09/20/22 1247)    Oxygen Delivery: 3 LPM (09/20/22 1745)         Family/support present wife/June   Patient belongings     Patient transported on cart   DC meds/scripts (obs/outpt) Not applicable   Inpatient Pain Meds Released? Yes       Special needs/considerations None   Tasks needing completion None       Sophia Meyers, SOFIA  ASCOM [id number]

## 2022-09-20 NOTE — ANESTHESIA PROCEDURE NOTES
Airway       Patient location during procedure: OR       Procedure Start/Stop Times: 9/20/2022 2:51 PM  Staff -        Anesthesiologist:  Lise Mcdonald MD       CRNA: Kathryn Fitzpatrick APRN CRNA       Performed By: CRNA  Consent for Airway        Urgency: elective  Indications and Patient Condition       Indications for airway management: dawson-procedural       Induction type:intravenous       Mask difficulty assessment: 2 - vent by mask + OA or adjuvant +/- NMBA    Final Airway Details       Final airway type: endotracheal airway       Successful airway: ETT - single and Oral  Endotracheal Airway Details        ETT size (mm): 7.5       Cuffed: yes       Successful intubation technique: direct laryngoscopy       DL Blade Type: Cameron 2       Grade View of Cords: 1       Adjucts: stylet       Position: Right       Measured from: lips       Secured at (cm): 232       Bite block used: Soft    Post intubation assessment        Placement verified by: capnometry, equal breath sounds and chest rise        Number of attempts at approach: 1       Number of other approaches attempted: 0       Secured with: pink tape and silk tape       Ease of procedure: easy       Dentition: Intact and Unchanged    Medication(s) Administered   Medication Administration Time: 9/20/2022 2:51 PM

## 2022-09-20 NOTE — BRIEF OP NOTE
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  Complications: None.  Implants:   Implant Name Type Inv. Item Serial No.  Lot No. LRB No. Used Action   IMP BASEPLATE RVRS SHLDR ZIM MED AUG [id number] - [id number] Total Joint Component/Insert IMP BASEPLATE RVRS SHLDR ZIM MED AUG [id number]  [employer] [id number] Left 1 Implanted   THREE STAPLES REMOVED FROM LEFT SHOULDER      Left 3 Explanted   IMP SCR CENTRAL ZIM REVERSE SHOULDER 6.5X20MM  [id number] - [id number] Metallic Hardware/[place] IMP SCR CENTRAL ZIM REVERSE SHOULDER 6.5X20MM  [id number]  [employer] [id number] Left 1 Implanted   IMP SCR LOCKING BIOM REV SHLDR 3.5 HEX [id number] 948994 - [id number] Metallic Hardware/[place] IMP SCR LOCKING BIOM REV SHLDR 3.5 HEX [id number] 861940  [employer] [id number] Left 1 Implanted   IMP SCR LOCKING BIOM REV SHLDR 3.5 HEX 4.69E36GT 597901 - [id number] Total Joint Component/Insert IMP SCR LOCKING BIOM REV SHLDR 3.5 HEX 4.89Y50HD 731481  [employer] [id number] Left 1 Implanted   IMP SCR LOCKING BIOM REV SHLDR 3.5 HEX 4.91E04XP 158167 - [id number] Total Joint Component/Insert IMP SCR LOCKING BIOM REV SHLDR 3.5 HEX 4.53O39AG 388959  [employer] [id number] Left 1 Implanted   IMP SCR LOCKING BIOM REV SHLDR 3.5 HEX 4.85O46KN 069284 - [id number] Metallic Hardware/[place] IMP SCR LOCKING  BIOM REV SHLDR 3.5 HEX 4.02I06ZV 165850  [employer] [id number] Left 1 Implanted   IMP GLENOSPHERE ZIM VERSA-DIAL 40MM STD [id number] - [id number] Total Joint Component/Insert IMP GLENOSPHERE ZIM VERSA-DIAL 40MM STD [id number]  [employer] [id number] Left 1 Implanted   IMP STEM MINI HUMERAL BIOM SHLDR 14MM 830889 - [id number] Total Joint Component/Insert IMP STEM MINI HUMERAL BIOM SHLDR 14MM 401050  [employer] [id number] Left 1 Implanted   IMP HUMERAL TRAY ZIM RVRS SHLDR STD [id number] - [id number] Total Joint Component/Insert IMP HUMERAL TRAY ZIM RVRS SHLDR STD [id number]  [employer] [id number] Left 1 Implanted   IMP BEARING HUMERAL ZIM 40MM STD PRLNG [id number] - [id number] Total Joint Component/Insert IMP BEARING HUMERAL ZIM 40MM STD PRLNG [id number]  [employer] [id number] Left 1 Implanted

## 2022-09-20 NOTE — ANESTHESIA PREPROCEDURE EVALUATION
Anesthesia Pre-Procedure Evaluation    Patient: Iam Kaur   MRN: [id number] : 1945        Procedure : Procedure(s):  ARTHROPLASTY, LEFT SHOULDER, TOTAL, REVERSE          Past Medical History:   Diagnosis Date     Chest pain, unspecified 1999    Atypical chest pain, felt to be stress related, negative GXT     Closed dislocation of acromioclavicular (joint)  &     Chronic dislocations of shoulder - two surgeries with good results     Irritable bowel syndrome      Malignant neoplasm of colon, unspecified site     Colon cancer/ rectal, Dr. Tee     Pneumonia, organism unspecified(486)     Hospitalized 3 to 4 days     Sleep apnea     severe, recommended CPAP      Past Surgical History:   Procedure Laterality Date     C EXCIS RECTAL LESION,TRANSANAL  2000    Transanal excision of rectal polyp, tubulovillous adenoma with severe dysplasia     COLONOSCOPY  07    repeat in 3 yrs     COLONOSCOPY  06/09/10    repeat in 3 yrs     COLONOSCOPY  13    colonoscopy, polypectomy, recheck in 3 years     COLONOSCOPY N/A 2018    Procedure: COMBINED COLONOSCOPY, SINGLE OR MULTIPLE BIOPSY/POLYPECTOMY BY BIOPSY;;  Surgeon: Rosendo Delgado MD;  Location: [facility] OR     COLONOSCOPY N/A 2022    Procedure: COLONOSCOPY, FLEXIBLE, WITH LESION REMOVAL USING SNARE;  Surgeon: Armando Acharya DO;  Location: [facility] OR     COLONOSCOPY WITH CO2 INSUFFLATION N/A 2018    Procedure: COLONOSCOPY WITH CO2 INSUFFLATION;  Colonoscopy  Loose stools  BMI 37.17  Pharmacy: [facility] - Fax [phone]  Referred by Dr. Delgado;  Surgeon: Rosendo Delgado MD;  Location: [facility] OR     COLONOSCOPY WITH CO2 INSUFFLATION N/A 2022    Procedure: COLONOSCOPY, WITH CO2 INSUFFLATION;  Surgeon: Armando Acharya DO;  Location: [facility] OR     ESOPHAGOSCOPY, GASTROSCOPY, DUODENOSCOPY (EGD), COMBINED  2010    COMBINED ESOPHAGOSCOPY, GASTROSCOPY, DUODENOSCOPY (EGD), BIOPSY  SINGLE OR MULTIPLE performed by SHAGUFTA ROLDAN at  GI     HC REMOVAL OF TONSILS,<12 Y/O      Unsure of age at time of surgery     HC REPAIR ROTATOR CUFF,ACUTE  1962, 1966    Bilateral shoulder surgery for chronic dislocation     INJECT EPID[facility] LUMBAR  10/14/2014    [facility]     REMOVAL OF SPERM DUCT(S)  1975    Vasectomy     ZZHC COLONOSCOPY THRU STOMA, DIAGNOSTIC  2001      Allergies   Allergen Reactions     Naproxen Itching      Social History     Tobacco Use     Smoking status: Never Smoker     Smokeless tobacco: Never Used     Tobacco comment: No smokers in home   Substance Use Topics     Alcohol use: Yes     Comment: 3-4 drinks per night      Wt Readings from Last 1 Encounters:   09/15/22 96.2 kg (212 lb)        Anesthesia Evaluation            ROS/MED HX  ENT/Pulmonary:     (+) sleep apnea, uses CPAP,     Neurologic:  - neg neurologic ROS     Cardiovascular:     (+) hypertension-----Previous cardiac testing   Echo: Date: Results:    Stress Test: Date: 11/3/10 Results:  No wall motion abnormality with post-exercise ECHO, i.e. no evidence for ischemia by ECHO  ECG Reviewed: Date: 9/15/22 Results:  Sinus Rhythm. Right bundle branch block.   Cath: Date: Results:      METS/Exercise Tolerance:     Hematologic:       Musculoskeletal:       GI/Hepatic:  - neg GI/hepatic ROS     Renal/Genitourinary:       Endo:     (+) type II DM, Last HgA1c: 5.4, Obesity,     Psychiatric/Substance Use:     (+) alcohol abuse     Infectious Disease:       Malignancy: Comment: Malignant neoplasm of rectum (2000), stable colonoscopies  (+) Malignancy, History of Other.    Other:            Physical Exam    Airway        Mallampati: II   TM distance: > 3 FB   Neck ROM: full   Mouth opening: > 3 cm    Respiratory Devices and Support         Dental  no notable dental history         Cardiovascular   cardiovascular exam normal          Pulmonary   pulmonary exam normal                OUTSIDE LABS:  CBC:   Lab Results    Component Value Date    WBC 7.4 09/15/2022    WBC 7.3 08/08/2018    HGB 13.3 09/15/2022    HGB 14.2 08/08/2018    HCT 40.3 09/15/2022    HCT 42.2 08/08/2018     09/15/2022     08/08/2018     BMP:   Lab Results   Component Value Date     07/13/2022     10/20/2021    POTASSIUM 4.1 07/13/2022    POTASSIUM 4.2 10/20/2021    CHLORIDE 107 07/13/2022    CHLORIDE 108 10/20/2021    CO2 25 07/13/2022    CO2 28 10/20/2021    BUN 20 07/13/2022    BUN 19 10/20/2021    CR 0.84 07/13/2022    CR 0.90 10/20/2021     (H) 08/24/2022     (H) 07/13/2022     COAGS: No results found for: PTT, INR, FIBR  POC: No results found for: BGM, HCG, HCGS  HEPATIC:   Lab Results   Component Value Date    ALBUMIN 3.8 07/13/2022    PROTTOTAL 7.3 07/13/2022    ALT 30 07/13/2022    AST 17 07/13/2022    ALKPHOS 93 07/13/2022    BILITOTAL 0.5 07/13/2022    BILIDIRECT 0.2 06/14/2002     OTHER:   Lab Results   Component Value Date    PH 7.0 08/07/2001    A1C 5.8 (H) 07/13/2022    KIRSTIE 9.4 07/13/2022    TSH 2.58 08/08/2018    CRP <2.9 08/14/2017    SED 10 08/14/2017       Anesthesia Plan    ASA Status:  2   NPO Status:  NPO Appropriate    Anesthesia Type: General.     - Airway: ETT   Induction: Intravenous.   Maintenance: Balanced.        Consents    Anesthesia Plan(s) and associated risks, benefits, and realistic alternatives discussed. Questions answered and patient/representative(s) expressed understanding.    - Discussed:     - Discussed with:  Patient         Postoperative Care    Pain management: IV analgesics, Oral pain medications, Multi-modal analgesia, Peripheral nerve block (Single Shot).   PONV prophylaxis: Ondansetron (or other 5HT-3), Dexamethasone or Solumedrol     Comments:    Other Comments: GETTA with std monitors, HOLGER Hunter MD

## 2022-09-20 NOTE — ANESTHESIA PROCEDURE NOTES
Brachial plexus Procedure Note    Pre-Procedure   Staff -        Anesthesiologist:  Houston Guzman MD       Resident/Fellow: Dameon Saravia DO       Performed By: fellow       Location: pre-op       Procedure Start/Stop Times: 9/20/2022 2:14 PM and 9/20/2022 2:24 PM       Pre-Anesthestic Checklist: patient identified, IV checked, site marked, risks and benefits discussed, informed consent, monitors and equipment checked, pre-op evaluation, at physician/surgeon's request and post-op pain management  Timeout:       Correct Patient: Yes        Correct Procedure: Yes        Correct Site: Yes        Correct Position: Yes        Correct Laterality: Yes        Site Marked: Yes  Procedure Documentation  Procedure: Brachial plexus       Diagnosis: POST OP PAIN       Laterality: left       Patient Position: sitting       Patient Prep/Sterile Barriers: sterile gloves, mask       Skin prep: Chloraprep       Local skin infiltrated with 2 mL of 2% lidocaine.  (interscalene approach).       Needle Gauge: 21.        Needle Length (millimeters): 100        Ultrasound guided       1. Ultrasound was used to identify targeted nerve, plexus, vascular marker, or fascial plane and place a needle adjacent to it in real-time.       2. Ultrasound was used to visualize the spread of anesthetic in close proximity to the above referenced structure.       3. A permanent image is entered into the patient's record.    Assessment/Narrative         The placement was negative for: blood aspirated, painful injection and site bleeding       Paresthesias: No.       Bolus given via needle..        Secured via.        Insertion/Infusion Method: Single Shot    Medication(s) Administered   Bupivacaine 0.25% PF (Infiltration) - Infiltration   5 mL - 9/20/2022 2:14:00 PM  Bupivacaine liposome (Exparel) 1.3% LA inj susp (Infiltration) - Infiltration   10 mL - 9/20/2022 2:14:00 PM  Medication Administration Time: 9/20/2022 2:14 PM     Comments:   Left interscalene nerve block

## 2022-09-21 ENCOUNTER — APPOINTMENT (OUTPATIENT)
Dept: OCCUPATIONAL THERAPY | Facility: CLINIC | Age: 77
End: 2022-09-21
Attending: ORTHOPAEDIC SURGERY
Payer: COMMERCIAL

## 2022-09-21 LAB
GLUCOSE BLDC GLUCOMTR-MCNC: 100 MG/DL (ref 70–99)
GLUCOSE BLDC GLUCOMTR-MCNC: 122 MG/DL (ref 70–99)
GLUCOSE BLDC GLUCOMTR-MCNC: 131 MG/DL (ref 70–99)
HGB BLD-MCNC: 11.9 G/DL (ref 13.3–17.7)

## 2022-09-21 PROCEDURE — 97535 SELF CARE MNGMENT TRAINING: CPT | Mod: GO

## 2022-09-21 PROCEDURE — 99204 OFFICE O/P NEW MOD 45 MIN: CPT | Performed by: INTERNAL MEDICINE

## 2022-09-21 PROCEDURE — 36415 COLL VENOUS BLD VENIPUNCTURE: CPT | Performed by: ORTHOPAEDIC SURGERY

## 2022-09-21 PROCEDURE — 250N000013 HC RX MED GY IP 250 OP 250 PS 637: Performed by: ORTHOPAEDIC SURGERY

## 2022-09-21 PROCEDURE — 250N000011 HC RX IP 250 OP 636: Performed by: ORTHOPAEDIC SURGERY

## 2022-09-21 PROCEDURE — 97110 THERAPEUTIC EXERCISES: CPT | Mod: GO

## 2022-09-21 PROCEDURE — 85018 HEMOGLOBIN: CPT | Performed by: ORTHOPAEDIC SURGERY

## 2022-09-21 PROCEDURE — 97530 THERAPEUTIC ACTIVITIES: CPT | Mod: GO

## 2022-09-21 PROCEDURE — 97165 OT EVAL LOW COMPLEX 30 MIN: CPT | Mod: GO

## 2022-09-21 PROCEDURE — 250N000013 HC RX MED GY IP 250 OP 250 PS 637

## 2022-09-21 PROCEDURE — 250N000013 HC RX MED GY IP 250 OP 250 PS 637: Performed by: INTERNAL MEDICINE

## 2022-09-21 PROCEDURE — 82962 GLUCOSE BLOOD TEST: CPT

## 2022-09-21 RX ORDER — ACETAMINOPHEN 325 MG/1
650 TABLET ORAL EVERY 4 HOURS PRN
Qty: 60 TABLET | Refills: 0 | Status: SHIPPED | OUTPATIENT
Start: 2022-09-23 | End: 2023-07-20

## 2022-09-21 RX ORDER — POLYETHYLENE GLYCOL 3350 17 G/17G
17 POWDER, FOR SOLUTION ORAL DAILY
Qty: 10 PACKET | Refills: 0 | Status: SHIPPED | OUTPATIENT
Start: 2022-09-21 | End: 2023-07-20

## 2022-09-21 RX ORDER — DEXTROSE MONOHYDRATE 25 G/50ML
25-50 INJECTION, SOLUTION INTRAVENOUS
Status: DISCONTINUED | OUTPATIENT
Start: 2022-09-21 | End: 2022-09-22 | Stop reason: HOSPADM

## 2022-09-21 RX ORDER — OXYCODONE HYDROCHLORIDE 5 MG/1
5 TABLET ORAL EVERY 4 HOURS PRN
Qty: 33 TABLET | Refills: 0 | Status: SHIPPED | OUTPATIENT
Start: 2022-09-21 | End: 2023-07-20

## 2022-09-21 RX ORDER — CLOTRIMAZOLE 1 %
CREAM (GRAM) TOPICAL 2 TIMES DAILY
Status: DISCONTINUED | OUTPATIENT
Start: 2022-09-21 | End: 2022-09-22 | Stop reason: HOSPADM

## 2022-09-21 RX ORDER — LISINOPRIL 5 MG/1
10 TABLET ORAL DAILY
Status: DISCONTINUED | OUTPATIENT
Start: 2022-09-21 | End: 2022-09-22 | Stop reason: HOSPADM

## 2022-09-21 RX ORDER — ATORVASTATIN CALCIUM 10 MG/1
10 TABLET, FILM COATED ORAL DAILY
Status: DISCONTINUED | OUTPATIENT
Start: 2022-09-21 | End: 2022-09-22 | Stop reason: HOSPADM

## 2022-09-21 RX ORDER — MULTIPLE VITAMINS W/ MINERALS TAB 9MG-400MCG
1 TAB ORAL DAILY
Status: DISCONTINUED | OUTPATIENT
Start: 2022-09-21 | End: 2022-09-22 | Stop reason: HOSPADM

## 2022-09-21 RX ORDER — NICOTINE POLACRILEX 4 MG
15-30 LOZENGE BUCCAL
Status: DISCONTINUED | OUTPATIENT
Start: 2022-09-21 | End: 2022-09-22 | Stop reason: HOSPADM

## 2022-09-21 RX ORDER — AMOXICILLIN 250 MG
1 CAPSULE ORAL 2 TIMES DAILY
Qty: 30 TABLET | Refills: 0 | Status: SHIPPED | OUTPATIENT
Start: 2022-09-21 | End: 2023-07-20

## 2022-09-21 RX ADMIN — CLOTRIMAZOLE: 0.01 CREAM TOPICAL at 21:07

## 2022-09-21 RX ADMIN — CEFAZOLIN SODIUM 2 G: 2 INJECTION, SOLUTION INTRAVENOUS at 07:01

## 2022-09-21 RX ADMIN — LISINOPRIL 10 MG: 5 TABLET ORAL at 11:41

## 2022-09-21 RX ADMIN — SENNOSIDES AND DOCUSATE SODIUM 1 TABLET: 8.6; 5 TABLET ORAL at 21:06

## 2022-09-21 RX ADMIN — OXYCODONE HYDROCHLORIDE 5 MG: 5 TABLET ORAL at 08:09

## 2022-09-21 RX ADMIN — OXYCODONE HYDROCHLORIDE 10 MG: 10 TABLET ORAL at 22:16

## 2022-09-21 RX ADMIN — CLOTRIMAZOLE: 0.01 CREAM TOPICAL at 09:51

## 2022-09-21 RX ADMIN — POLYETHYLENE GLYCOL 3350 17 G: 17 POWDER, FOR SOLUTION ORAL at 08:03

## 2022-09-21 RX ADMIN — SENNOSIDES AND DOCUSATE SODIUM 1 TABLET: 8.6; 5 TABLET ORAL at 08:03

## 2022-09-21 RX ADMIN — ATORVASTATIN CALCIUM 10 MG: 10 TABLET, FILM COATED ORAL at 11:41

## 2022-09-21 RX ADMIN — ACETAMINOPHEN 975 MG: 325 TABLET, FILM COATED ORAL at 21:25

## 2022-09-21 RX ADMIN — METFORMIN HYDROCHLORIDE 500 MG: 500 TABLET, FILM COATED ORAL at 18:13

## 2022-09-21 RX ADMIN — OXYCODONE HYDROCHLORIDE 5 MG: 5 TABLET ORAL at 18:12

## 2022-09-21 RX ADMIN — MULTIPLE VITAMINS W/ MINERALS TAB 1 TABLET: TAB at 11:41

## 2022-09-21 RX ADMIN — ACETAMINOPHEN 975 MG: 325 TABLET, FILM COATED ORAL at 07:00

## 2022-09-21 RX ADMIN — ASPIRIN 162 MG: 81 TABLET, COATED ORAL at 08:02

## 2022-09-21 RX ADMIN — OXYCODONE HYDROCHLORIDE 5 MG: 5 TABLET ORAL at 00:39

## 2022-09-21 RX ADMIN — ACETAMINOPHEN 975 MG: 325 TABLET, FILM COATED ORAL at 13:47

## 2022-09-21 RX ADMIN — METFORMIN HYDROCHLORIDE 500 MG: 500 TABLET, FILM COATED ORAL at 11:41

## 2022-09-21 ASSESSMENT — ACTIVITIES OF DAILY LIVING (ADL)
IADL_COMMENTS: FAMILY CAN ASSIST PRN
ADLS_ACUITY_SCORE: 26
ADLS_ACUITY_SCORE: 25
ADLS_ACUITY_SCORE: 26
ADLS_ACUITY_SCORE: 26
ADLS_ACUITY_SCORE: 25
ADLS_ACUITY_SCORE: 26
ADLS_ACUITY_SCORE: 25
ADLS_ACUITY_SCORE: 26

## 2022-09-21 NOTE — PROGRESS NOTES
"   09/21/22 0927   Quick Adds   Type of Visit Initial Occupational Therapy Evaluation   Living Environment   People in Home spouse;child(taylor), adult   Current Living Arrangements house   Home Accessibility stairs to enter home   Number of Stairs, Main Entrance 3   Stair Railings, Main Entrance railings on both sides of stairs   Transportation Anticipated family or friend will provide   Living Environment Comments Pt lives with wife and adult daughter. Pt reports good support from family. Pt has walk-in shower and regular height toilets. Has shower chair but does not use.   Self-Care   Usual Activity Tolerance good   Current Activity Tolerance moderate   Regular Exercise No   Equipment Currently Used at Home none   Fall history within last six months no   Activity/Exercise/Self-Care Comment Pt having trouble with ADL 2/2 limited L shoulder use   Instrumental Activities of Daily Living (IADL)   IADL Comments family can assist prn   General Information   Onset of Illness/Injury or Date of Surgery 09/21/22   Referring Physician Aristeo Kelley MD   Patient/Family Therapy Goal Statement (OT) to go home   Additional Occupational Profile Info/Pertinent History of Current Problem Per chart: \"Iam Kaur is a 77 year old male with s/p L rTSA with Dr. La 9/20/22\"   Existing Precautions/Restrictions shoulder;weight bearing   Left Upper Extremity (Weight-bearing Status) non weight-bearing (NWB)   General Observations and Info Activity: up with A   Cognitive Status Examination   Orientation Status orientation to person, place and time   Pain Assessment   Patient Currently in Pain No   Integumentary/Edema   Integumentary/Edema Comments post op edema   Range of Motion Comprehensive   Comment, General Range of Motion mild impairment in R shoulder, L UE immobilized   Strength Comprehensive (MMT)   Comment, General Manual Muscle Testing (MMT) Assessment L UE n/t 2/2 post op precautions   Coordination   Functional " Limitations Impaired ability to perform bilateral tasks;Fine motor ADL performance impaired;Object transport impaired   Bed Mobility   Comment (Bed Mobility) SBA HOB elevated   Transfers   Transfer Comments SBA   Activities of Daily Living   BADL Assessment/Intervention upper body dressing;lower body dressing   Upper Body Dressing Assessment/Training   [place] Level (Upper Body Dressing) maximum assist (25% patient effort)   Lower Body Dressing Assessment/Training   [place] Level (Lower Body Dressing) minimum assist (75% patient effort)   Clinical Impression   Criteria for Skilled Therapeutic Interventions Met (OT) Yes, treatment indicated   OT Diagnosis decreased I with ADL   OT Problem List-Impairments impacting ADL activity tolerance impaired;coordination;pain;post-surgical precautions   Assessment of Occupational Performance 1-3 Performance Deficits   Identified Performance Deficits dressing, bathing, IADL   Planned Therapy Interventions (OT) ADL retraining;IADL retraining;bed mobility training;transfer training;home program guidelines   Clinical Decision Making Complexity (OT) low complexity   Risk & Benefits of therapy have been explained evaluation/treatment results reviewed;care plan/treatment goals reviewed;risks/benefits reviewed;current/potential barriers reviewed;participants voiced agreement with care plan;participants included;patient   OT Discharge Planning   OT Discharge Recommendation (DC Rec) home with assist   OT Rationale for DC Rec Pt moving well and has excellent supports in place at home. Ok to d/c home with A from family as needed, recommend OP PT per MD protocol.   OT Brief overview of current status ind no AD, ok to walk in halls   Therapy Certification   Start of Care Date 09/21/22   Medical Diagnosis s/p L reverse TSA   OT Goals   Therapy Frequency (OT) One time eval and treatment   OT Predicted Duration/Target Date for Goal Attainment 09/21/22   OT Goals Upper Body  Dressing;Lower Body Dressing;Toilet Transfer/Toileting;Bed Mobility;OT Goal 1   OT: Upper Body Dressing Minimal assist;including orthotic;Goal Met   OT: Lower Body Dressing Supervision/stand-by assist;within precautions;Goal Met   OT: Bed Mobility Modified independent;within precautions;Goal Met   OT: Toilet Transfer/Toileting Modified independent;within precautions;Goal Met   OT: Goal 1 Pt will complete UE HEP following instruction.

## 2022-09-21 NOTE — PLAN OF CARE
Goal Outcome Evaluation:        VS: VSS. Afebrile    O2: 97% on RA.    Output: Voids spontaneously without difficulty, ambulates to bathroom.   Last BM: 9/20. Bowel sounds active, passing flatus.    Activity: Independent    Skin: Incision to L shoulder, otherwise intact. Edema bilateral hands    Pain: Managed with PRN Oxy (5mg) and scheduled Tylenol   CMS: A&Ox4, intact. Denies numbness and tingling    Dressing: CDI.    Diet: Regular   LDA: PIV R forearm.   Hemovac    Equipment: IV pole/pump, capno, PCDs, personal belongings, call light within reach   Plan: Possible discharge tomorrow pending Hemovac output.    Additional Info:

## 2022-09-21 NOTE — PLAN OF CARE
Occupational Therapy Discharge Summary    Reason for therapy discharge:    All goals and outcomes met, no further needs identified.    Progress towards therapy goal(s). See goals on Care Plan in [facility] electronic health record for goal details.  Goals met    Therapy recommendation(s):    Continue home exercise program.  Recommend OP PT per post op protocol.

## 2022-09-21 NOTE — DISCHARGE SUMMARY
ORTHOPAEDIC SURGERY DISCHARGE SUMMARY     Date of Admission: 9/20/2022  Date of Discharge: 9/22/2022 10:08 AM  Disposition: Home  Staff Physician: Iram La, *  Primary Care Provider: Barak Cameron    DISCHARGE DIAGNOSIS:  Primary osteoarthritis of left shoulder [M19.012]    PROCEDURES: Procedure(s):  Left reverse total shoulder arthroplasty on 9/20/2022    BRIEF HISTORY:  Patient is a 76 yo M w/PMHx T2DM, HTN, HLD, severe JAMAAL no longer uses CPAP after wt loss, colon ca s/p tumor resection followed by chemoradiation therapy and 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 He has a long history of bilateral shoulder instability with surgery on his left shoulder in 1965, patient reports shoulders were stable thereafter. Presents for HILDA crowell/ Dr La.    HOSPITAL COURSE:    The patient was admitted following the above listed procedures for pain control and rehabilitation. Iam Kaur did well post-operatively. Medicine was consulted post operatively to aid in management of medical co-morbidities. The patient received routine nursing cares and at the time of discharge was medically stable. Vital signs were stable throughout admission. The patient is tolerating a regular diet and is voiding spontaneously. All PT/OT goals have been met for safe mobility. Pain is now controlled on oral medications which will be available on discharge. Stool softeners have been used while taking pain medications to help prevent constipation. Iam Kaur is deemed medically safe to discharge.     Antibiotics:  Ancef given periop and 24 hours postop.  DVT prophylaxis:  ASA 162mg every day initiated after surgery and will be continued for 4 weeks.   PT Progress:  Has met PT/OT goals for safe mobility.    Pain Meds:  Weaned off all IV pain meds by discharge.  Inpatient Events: No significant events or complications.     PHYSICAL EXAM:    Left  UE:  -Sens: SILT m/u/r/axillary  -Motor: Fires EPL/FDS/FDP/IO  -Vasc:  2+ radial pulse     Dressing CDI     Skin: Rash in patient's axilla from history of longstanding limited motion in his left shoulder       FOLLOWUP:    Follow up with Dr. La at 2 and 6 weeks postoperatively.    Future Appointments   Date Time Provider Department Center   10/3/2022 11:15 AM [facility] [place]   10/31/2022 11:00 AM [facility] La [facility] [place]       Orthopaedic Surgery appointments are at the [facility] ([address] [place] [address]). Call [phone] to schedule a follow-up appointment at this location with your provider.     PLANNED DISCHARGE ORDERS:     DVT Prophylaxis: ASA 162mg every day x 4 weeks     Activity: NWB LUE, arm in sling until clinic follow-up     Wound Care: see Below      Current Discharge Medication List      START taking these medications    Details   acetaminophen (TYLENOL) 325 MG tablet Take 2 tablets (650 mg) by mouth every 4 hours as needed for other  Qty: 60 tablet, Refills: 0    Associated Diagnoses: Primary osteoarthritis of both shoulders      aspirin (ASA) 81 MG EC tablet Take 2 tablets (162 mg) by mouth daily  Qty: 60 tablet, Refills: 0    Associated Diagnoses: Primary osteoarthritis of both shoulders      oxyCODONE (ROXICODONE) 5 MG tablet Take 1 tablet (5 mg) by mouth every 4 hours as needed  Qty: 33 tablet, Refills: 0    Associated Diagnoses: Primary osteoarthritis of both shoulders      polyethylene glycol (MIRALAX) 17 g packet Take 17 g by mouth daily  Qty: 10 packet, Refills: 0    Associated Diagnoses: Primary osteoarthritis of both shoulders      senna-docusate (SENOKOT-S/PERICOLACE) 8.6-50 MG tablet Take 1 tablet by mouth 2 times daily  Qty: 30 tablet, Refills: 0    Associated Diagnoses: Primary osteoarthritis of both shoulders         CONTINUE these medications which have NOT CHANGED    Details   alcohol  swab prep pads Use to swab area of injection/shazia as directed.  Qty: 100 each, Refills: 3    Associated Diagnoses: Type 2 diabetes mellitus without complication, without long-term current use of insulin (H)      atorvastatin (LIPITOR) 10 MG tablet Take 1 tablet (10 mg) by mouth daily  Qty: 90 tablet, Refills: 3    Associated Diagnoses: Hyperlipidemia LDL goal <100      blood glucose (NO BRAND SPECIFIED) test strip Use to test blood sugar 3 times daily or as directed. To accompany: Blood Glucose Monitor Brands: per insurance.  Qty: 100 strip, Refills: 6    Associated Diagnoses: Type 2 diabetes mellitus without complication, without long-term current use of insulin (H)      blood glucose calibration (NO BRAND SPECIFIED) solution To accompany: Blood Glucose Monitor Brands: per insurance.  Qty: 1 Bottle, Refills: 3    Associated Diagnoses: Type 2 diabetes mellitus without complication, without long-term current use of insulin (H)      blood glucose monitoring (NO BRAND SPECIFIED) meter device kit Use to test blood sugar 3 times daily or as directed. Blood Glucose Monitor Brands: per insurance.  Qty: 1 kit, Refills: 0    Associated Diagnoses: Type 2 diabetes mellitus without complication, without long-term current use of insulin (H)      lisinopril (ZESTRIL) 10 MG tablet Take 1 tablet (10 mg) by mouth daily  Qty: 90 tablet, Refills: 3    Associated Diagnoses: Essential hypertension with goal blood pressure less than 140/90      metFORMIN (GLUCOPHAGE) 500 MG tablet Take 1 tablet (500 mg) by mouth 2 times daily (with meals)  Qty: 180 tablet, Refills: 2    Associated Diagnoses: Type 2 diabetes mellitus without complication, without long-term current use of insulin (H)      multivitamin w/minerals (THERA-VIT-M) tablet Take 1 tablet by mouth daily      thin (NO BRAND SPECIFIED) lancets Use to check glucose 3 times daily. To accompany: Blood Glucose Monitor Brands: per insurance.  Qty: 100 each, Refills: 6    Associated  Diagnoses: Type 2 diabetes mellitus without complication, without long-term current use of insulin (H)         STOP taking these medications       ASPIRIN 81 MG OR TABS Comments:   Reason for Stopping:                 Discharge Procedure Orders   Physical Therapy Referral   Referral Priority: Routine Referral Type: Rehab Therapy Physical Therapy   Number of Visits Requested: 1     Reason for your hospital stay   Order Comments: Iam Kaur is a 77 year old male with s/p L rTSA with Dr. La 9/20/22.     Activity   Order Comments: Your activity upon discharge: activity as tolerated    Activity:LUE in sling at all time  Weight bearing status: NWB LUE     Order Specific Question Answer Comments   Is discharge order? Yes      When to contact your care team   Order Comments: Call Dr La if you have any of the following: temperature greater than 101.3  or less than 96.5,  increased shortness of breath, increased drainage, increased swelling, or increased pain.     Wound care and dressings   Order Comments: Instructions to care for your wound at home: ice to area for comfort, keep wound clean and dry, may get incision wet in shower but do not soak or scrub, reinforce dressing as needed, and remove dressing in 7 days.     Adult [facility] Follow-up and recommended labs and tests   Order Comments: Follow up with Dr La as scheduled. Clinic phone number is     Appointments on [place] and/or [facility] (with [facility] or [facility] provider or service). Call [phone] if you haven't heard regarding these appointments within 7 days of discharge.         Savannah Gallegos MD  Orthopaedic Surgery, PGY1

## 2022-09-21 NOTE — PROGRESS NOTES
"  Orthopaedic Surgery Daily Progress Note     Subjective: Iam Kaur is a 77 year old male POD # 1 L rTSA. Pain is well controlled. Block intact. Tolerating normal diet. No fever, chills. No chest pain or shortness of breath. No abdominal pain, nausea, vomiting. No diarrhea or constipation. No urinary difficulties.     Physical Exam   /67 (BP Location: Right leg)   Pulse 78   Temp (!) 96  F (35.6  C) (Oral)   Resp 16   Ht 1.676 m (5' 6\")   Wt 95.8 kg (211 lb 3.2 oz)   SpO2 97%   BMI 34.09 kg/m      Intake/Output Summary (Last 24 hours) at 9/21/2022 0622  Last data filed at 9/21/2022 0517  Gross per 24 hour   Intake 1000 ml   Output 1140 ml   Net -140 ml     General: Alert, well-appearing  in no acute distress.  Respiratory: Non-labored breathing.   Cardiovascular: Extremities warm and well perfused   Extremities: moving all four extremities.       Left UE:  -Sens: SILT m/u/r/axillary  -Motor: Fires EPL/FDS/FDP/IO  -Vasc:  2+ radial pulse    Dressing CDI    Skin: Rash in patient's axilla from patient's longstanding history of limited ROM in the left shoulder    Labs    Complete Blood Count   Recent Labs   Lab 09/15/22  0943   WBC 7.4   HGB 13.3        Basic Metabolic Panel  Recent Labs   Lab 09/20/22  1740 09/20/22  1410 09/20/22  1253   POTASSIUM  --  4.1  --    CR  --  0.76  --    *  --  129*     Coagulation Profile  No lab results found in last 7 days.    Assessment/Plan:  Assessment and Plan:  Iam Kaur is a 77 year old male with s/p L rTSA with Dr. La 9/20/22. He is progressing as expected with plans for PT/OT today.       9/21 Update:  -Clotrimazole BID to be applied to left axilla    Orthopedic Surgery Primary  Activity:LUE in sling at all time  Weight bearing status: NWB LUE  Pain management: Transition from IV to PO as tolerated.    Antibiotics: Ancef x 24 hours  Diet: Begin with clear fluids and progress diet as tolerated.   DVT prophylaxis: ASA 30 " days  Imaging: no further imaging needed  Labs: Monitor Hgb   Dressings: Keep clean, dry and intact   Drains: Can remove drain when output <30mL per shfit  Physical Therapy/Occupational Therapy: Eval and treat.  Consults: IM.  Follow-up: Clinic 2 weeks, 6 weeks w/ Dr La  Disposition: Likely home    Savannah Gallegos MD   Orthopaedic Surgery Resident  P: [phone]

## 2022-09-21 NOTE — ANESTHESIA POSTPROCEDURE EVALUATION
"Patient: Iam Kaur    Procedure: Procedure(s):  ARTHROPLASTY, LEFT SHOULDER, TOTAL, REVERSE       Anesthesia Type:  General    Note:  Disposition: Inpatient   Postop Pain Control: Uneventful            Sign Out: Well controlled pain   PONV: No   Neuro/Psych: Uneventful            Sign Out: Acceptable/Baseline neuro status   Airway/Respiratory: Uneventful            Sign Out: Acceptable/Baseline resp. status   CV/Hemodynamics: Uneventful            Sign Out: Acceptable CV status; No obvious hypovolemia; No obvious fluid overload   Other NRE: NONE   DID A NON-ROUTINE EVENT OCCUR? No           Last vitals:  Vitals Value Taken Time   /79 09/20/22 1830   Temp 36.7  C (98.1  F) 09/20/22 1830   Pulse 80 09/20/22 1840   Resp 15 09/20/22 1840   SpO2 94 % 09/20/22 1840   Vitals shown include unvalidated device data.    Patient Vitals for the past 24 hrs:   BP Temp Temp src Pulse Resp SpO2 Height Weight   09/20/22 1830 (!) 141/79 36.7  C (98.1  F) Axillary 81 12 96 % -- --   09/20/22 1815 (!) 144/90 -- -- 80 15 98 % -- --   09/20/22 1800 (!) 143/81 -- -- 81 16 97 % -- --   09/20/22 1745 (!) 161/87 -- -- 86 17 97 % -- --   09/20/22 1737 (!) 175/94 36.7  C (98.1  F) -- 87 16 98 % -- --   09/20/22 1425 (!) 157/87 -- -- 78 17 98 % -- --   09/20/22 1420 (!) 144/82 -- -- 77 14 98 % -- --   09/20/22 1415 (!) 155/86 -- -- 79 16 99 % -- --   09/20/22 1410 (!) 146/91 -- -- 80 8 99 % -- --   09/20/22 1405 -- -- -- 81 25 99 % -- --   09/20/22 1400 -- -- -- 80 18 99 % -- --   09/20/22 1355 -- -- -- 81 16 99 % -- --   09/20/22 1247 (!) 144/94 36.7  C (98.1  F) Oral 92 20 100 % 1.676 m (5' 6\") 95.8 kg (211 lb 3.2 oz)         Electronically Signed By: Laila Lowry MD  September 20, 2022  8:38 PM  "

## 2022-09-21 NOTE — CONSULTS
[facility]  Consult Note - Hospitalist Service, GOLD TEAM 17  Date of Admission:  9/20/2022  Consult Requested by: Dr. La  Reason for Consult: Postop medical comanagement    Assessment & Plan   Iam Kaur is a 77 year old male admitted on 9/20/2022.  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  He underwent elective reverse left TSA with Dr. La on 9/20/2022.  Patient is currently in [facility] [place] for postop care.  Dr. La requested internal medicine consultation for postop medical management.  Mr. Kaur does not have any complaints.  He states his postop pain under good control with current medications.    S/p reverse left TSA  ---   POD #1  ---   PT/OT consult  ---   Received prophylactic Ancef  ---   Further care per Ortho    DVT prophylaxis  ---   ASA    Acute postop left shoulder pain  ---   Oxycodone Tylenol    T2DM  ---   Fasting glucose was 138 this am  ---   Resume PTA metformin  ---   Add medium intensity Novolog SSI    HLD  ---   Resume PTA atorvastatin and ASA    HTN  ---   Resume PTA lisinopril    Acute postop blood loss anemia  ---   Hemoglobin was 13.3 g on 9/15 ---> 11.9 g today, not clinically significant  ---   Denies CP, SOB, lightheadedness or dizziness  ---   No indication for transfusions    Severe JAMAAL  ---   No longer uses CPAP after some weight loss          CODE STATUS:   Full  Disposition:   Per Ortho            Concha Vaughn MD  [facility]  Securely message with the Vocera Web Console (learn more here)  Text page via [facility] Paging/Directory   Please see signed in provider for up to date coverage information  Hospitalist Service, GOLD TEAM  17      ______________________________________________________________________    Chief Complaint   Internal medicine consult for postop medical co-management    History is obtained from the patient and EMR    History of Present Illness   Iam Kaur is a 77 year old male w/ h/o T2DM, HTN, HLD, severe JAMAAL no longer uses CPAP after wt loss, colon ca s/p tumor resection followed by chemoradiation therapy and 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  He underwent elective reverse left TSA with Dr. La on 9/20/2022.  Patient is currently in [facility] [place] for postop care.  Dr. La requested internal medicine consultation for postop medical management.  Mr. Kaur does not have any complaints.  He states his postop pain under good control with current medications.    Review of Systems   The 10 point Review of Systems is negative other than noted in the HPI or here.     Past Medical History    I have reviewed this patient's medical history and updated it with pertinent information if needed.   Past Medical History:   Diagnosis Date     Chest pain, unspecified 06/22/1999    Atypical chest pain, felt to be stress related, negative GXT     Closed dislocation of acromioclavicular (joint) 1962 & 1990    Chronic dislocations of shoulder - two surgeries with good results     Irritable bowel syndrome      Malignant neoplasm of colon, unspecified site 2000    Colon cancer/ rectal, Dr. Tee     Pneumonia, organism unspecified(486) 1977    Hospitalized 3 to 4 days     Sleep apnea 11/09    severe, recommended CPAP       Past Surgical History   I have reviewed this patient's surgical history and updated it with pertinent information if needed.  Past Surgical History:   Procedure Laterality Date     C EXCIS RECTAL LESION,TRANSANAL  05/09/2000    Transanal excision of rectal polyp, tubulovillous adenoma with severe dysplasia     COLONOSCOPY  03/05/07     repeat in 3 yrs     COLONOSCOPY  06/09/10    repeat in 3 yrs     COLONOSCOPY  6/11/13    colonoscopy, polypectomy, recheck in 3 years     COLONOSCOPY N/A 9/14/2018    Procedure: COMBINED COLONOSCOPY, SINGLE OR MULTIPLE BIOPSY/POLYPECTOMY BY BIOPSY;;  Surgeon: Rosendo Delgado MD;  Location: [facility] OR     COLONOSCOPY N/A 8/24/2022    Procedure: COLONOSCOPY, FLEXIBLE, WITH LESION REMOVAL USING SNARE;  Surgeon: Armando Acharya DO;  Location: [facility] OR     COLONOSCOPY WITH CO2 INSUFFLATION N/A 9/14/2018    Procedure: COLONOSCOPY WITH CO2 INSUFFLATION;  Colonoscopy  Loose stools  BMI 37.17  [facility] - Fax [phone]  Referred by Dr. Delgado;  Surgeon: Rosendo Delgado MD;  Location: [facility] OR     COLONOSCOPY WITH CO2 INSUFFLATION N/A 8/24/2022    Procedure: COLONOSCOPY, WITH CO2 INSUFFLATION;  Surgeon: Armando Acharya DO;  Location: [facility] OR     ESOPHAGOSCOPY, GASTROSCOPY, DUODENOSCOPY (EGD), COMBINED  11/30/2010    COMBINED ESOPHAGOSCOPY, GASTROSCOPY, DUODENOSCOPY (EGD), BIOPSY SINGLE OR MULTIPLE performed by SHAGUFTA ROLDAN at  GI     HC REMOVAL OF TONSILS,<10 Y/O      Unsure of age at time of surgery     HC REPAIR ROTATOR CUFF,ACUTE  1962, 1966    Bilateral shoulder surgery for chronic dislocation     INJECT EPIDURAL LUMBAR  10/14/2014    [facility]     REMOVAL OF SPERM DUCT(S)  1975    Vasectomy     ZZ COLONOSCOPY THRU STOMA, DIAGNOSTIC  2001       Social History   I have reviewed this patient's social history and updated it with pertinent information if needed.  Social History     Tobacco Use     Smoking status: Never Smoker     Smokeless tobacco: Never Used     Tobacco comment: No smokers in home   Vaping Use     Vaping Use: Never used   Substance Use Topics     Alcohol use: Yes     Comment: 3-4 drinks per night     Drug use: No     Comment: caffeine 3-4 daily       Family History   I have reviewed this patient's family history and updated it with pertinent  information if needed.  Family History   Problem Relation Age of Onset     Alzheimer Disease Father      Cerebrovascular Disease Father         x 3-4 episodes     Arthritis Mother         RA     Breast Cancer Sister      Heart Disease Sister      Heart Disease Sister      Other Cancer Brother      Diabetes Paternal Grandmother      Heart Disease Maternal Grandmother      Heart Disease Maternal Grandfather      Hypertension Brother        Medications   Facility-Administered Medications Prior to Admission   Medication Dose Route Frequency Provider Last Rate Last Admin     3 mL bupivacaine (MARCAINE) preservative free injection 0.5% (20 mL vial)  3 mL   Carlos Rodriguez PA-C   3 mL at 06/04/21 1411     triamcinolone (KENALOG-40) injection 80 mg  80 mg   Carlos Rodriguez PA-C   80 mg at 06/04/21 1411     Medications Prior to Admission   Medication Sig Dispense Refill Last Dose     alcohol swab prep pads Use to swab area of injection/shazia as directed. 100 each 3 Past Month at Unknown time     atorvastatin (LIPITOR) 10 MG tablet Take 1 tablet (10 mg) by mouth daily 90 tablet 3 Past Week at Unknown time     blood glucose (NO BRAND SPECIFIED) test strip Use to test blood sugar 3 times daily or as directed. To accompany: Blood Glucose Monitor Brands: per insurance. 100 strip 6 Past Month at Unknown time     blood glucose calibration (NO BRAND SPECIFIED) solution To accompany: Blood Glucose Monitor Brands: per insurance. 1 Bottle 3 Past Month at Unknown time     blood glucose monitoring (NO BRAND SPECIFIED) meter device kit Use to test blood sugar 3 times daily or as directed. Blood Glucose Monitor Brands: per insurance. 1 kit 0 Past Month at Unknown time     lisinopril (ZESTRIL) 10 MG tablet Take 1 tablet (10 mg) by mouth daily 90 tablet 3 Past Week at Unknown time     metFORMIN (GLUCOPHAGE) 500 MG tablet Take 1 tablet (500 mg) by mouth 2 times daily (with meals) 180 tablet 2 Past Week at Unknown time     multivitamin  w/minerals (THERA-VIT-M) tablet Take 1 tablet by mouth daily   Past Month at Unknown time     thin (NO BRAND SPECIFIED) lancets Use to check glucose 3 times daily. To accompany: Blood Glucose Monitor Brands: per [employer]. 100 each 6 Past Month at Unknown time     [DISCONTINUED] ASPIRIN 81 MG OR TABS ONE DAILY 0 0 Past Month at Unknown time       Allergies   Allergies   Allergen Reactions     Naproxen Itching       Physical Exam   Vital Signs: Temp: (!) 96.3  F (35.7  C) Temp src: Oral BP: 138/71 Pulse: 78   Resp: 16 SpO2: 97 % O2 Device: None (Room air) Oxygen Delivery: 1.5 LPM  Weight: 211 lbs 3.21 oz  General: aao x 3, NAD.  HEENT:  NC/AT, PERRL, EOMI, neck supple, no thyromegaly, op clear, mmm.  CVS:  NL s 1 and s2, no m/r/g.  Lungs:  CTA B/L.   Abd:  Soft, + bs, NT, no rebound or gaurding, no fluid shift.  Ext: Left shoulder is immobilized  Lymph:  No edema.  Neuro:  Nonfocal.  Musculoskeletal: No calf tenderness to palpation.    Skin:  No rash.  Psychiatry:  Mood and affect appropriate.      Data     Results for orders placed or performed during the hospital encounter of 09/20/22 (from the past 24 hour(s))   Glucose by meter   Result Value Ref Range    GLUCOSE BY METER POCT 129 (H) 70 - 99 mg/dL   POC US Guidance Needle Placement    Impression    Left interscalene nerve block   Potassium   Result Value Ref Range    Potassium 4.1 3.4 - 5.3 mmol/L   Creatinine   Result Value Ref Range    Creatinine 0.76 0.66 - 1.25 mg/dL    GFR Estimate >90 >60 mL/min/1.73m2   Glucose by meter   Result Value Ref Range    GLUCOSE BY METER POCT 138 (H) 70 - 99 mg/dL   XR Shoulder Left Port G/E 2 Views    Narrative    EXAM: XR SHOULDER LEFT PORT G/E 2 VIEWS  LOCATION: [facility]  DATE/TIME: 9/20/2022 6:16 PM    INDICATION: Status post surgery.  COMPARISON: 08/04/2022.      Impression    IMPRESSION: Interval placement of a left reverse ball-and-socket total shoulder arthroplasty.  Components project in the expected position. No acute displaced periprosthetic fracture. Skin staples project over the operative site with surgical drain in   place and expected postop soft tissue gas. Degenerative change left AC joint. Atherosclerotic vascular disease aortic knob.   Hemoglobin   Result Value Ref Range    Hemoglobin 11.9 (L) 13.3 - 17.7 g/dL     Most Recent 3 CBC's:Recent Labs   Lab Test 09/21/22  0823 09/15/22  0943 08/08/18  0922 08/14/17  1002   WBC  --  7.4 7.3 6.0   HGB 11.9* 13.3 14.2 13.4   MCV  --  98 96 97   PLT  --  284 263 234     Most Recent 3 BMP's:Recent Labs   Lab Test 09/20/22  1740 09/20/22  1410 09/20/22  1253 08/24/22  0937 07/13/22  1413 10/20/21  1443 10/20/21  1443 08/05/20  0947   NA  --   --   --   --  138  --  139 138   POTASSIUM  --  4.1  --   --  4.1  --  4.2 4.6   CHLORIDE  --   --   --   --  107  --  108 107   CO2  --   --   --   --  25  --  28 29   BUN  --   --   --   --  20  --  19 17   CR  --  0.76  --   --  0.84  --  0.90 0.93   ANIONGAP  --   --   --   --  6  --  3 2*   KIRSTIE  --   --   --   --  9.4  --  9.0 9.1   *  --  129* 121* 111*   < > 117* 113*    < > = values in this interval not displayed.

## 2022-09-21 NOTE — PLAN OF CARE
Goal Outcome Evaluation:  Alert and oriented X 4. Able to make needs known. Call light in reach. VSS. Maintaining O2 sats in upper 90s on 1.5  LPM/NC. Shoulder pain managed with Oxycodone 5 mg  and scheduled Tylenol.  Left shoulder dressing CDI.  Immobilizer on. CMS/neuros intact except reports some numbness and tingling in left LUE. Hemovac patent with moderate amount of bloody drainage. PIV patent, VI fluids and antibiotic infusing as ordered. Up to BR with assist of 1 and gait bet. Voiding without difficulty. Denies nausea, headache, dizziness,lightheadedness, chest ronni or SOB. Appears to be sleeping during rounds. Continue with plan of care.

## 2022-09-21 NOTE — PLAN OF CARE
"VS: /54 (BP Location: Right arm, Patient Position: Semi-Lunsford's, Cuff Size: Adult Large)   Pulse 88   Temp 96.9  F (36.1  C) (Oral)   Resp 16   Ht 1.676 m (5' 6\")   Wt 95.8 kg (211 lb 3.2 oz)   SpO2 98%   BMI 34.09 kg/m     O2: >90% on room air. Denies SOb/N/V/chest pain    Output: Voiding spontaneously without difficulties    Last BM: 9/20/22   Activity: Up ad chapin    Skin: Incision to left shoulder   Pain: Managed with schedule tylenol,PRN oxycodone and ice applied    CMS: Denies numbness and tingling    Dressing: CDI    Diet: Regular diet    LDA: Hemovac   Equipment: IV pole/pump, sling, personal belongings, call light within patient reach    Plan: To discharge to home tomorrow pending drain output   Additional Info:           "

## 2022-09-21 NOTE — OP NOTE
DATE OF PROCEDURE: 9/20/22    PREOPERATIVE DIAGNOSIS:   1.  Status post remote history of open anterior stabilization with subscap transfer using staples  2.  Severe left post instability arthropathy  3.  Profound increased glenoid retroversion to nearly 30 degrees    POSTOPERATIVE DIAGNOSIS:   1.  Status post remote history of open anterior stabilization with subscap transfer using staples  2.  Severe left post instability arthropathy  3.  Profound increased glenoid retroversion to nearly 30 degrees     PROCEDURE:   1.  Removal of deep implant left shoulder  2.  Left reverse total shoulder arthroplasty.     Note on complexity: Modifier 22 is requested given the increase in complexity due to profound joint contracture and altered anatomy due to prior surgery.  This increased complexity increased surgical time over 50% of normal.    STAFF SURGEON: Iram La MD.   ASSISTANT: MD Gerson; Savannah Gallegos MD    ANESTHESIA: General endotracheal anesthesia.   ESTIMATED BLOOD LOSS: 200 mL.   COMPLICATIONS: None.   DRAINS: Hemovac x1.     IMPLANTS:   1. Bi[employer] Comprehensive Shoulder humeral stem 14 x 83  2. [employer] Comprehensive Shoulder standard tray and standard bearing for 40 mm glenosphere  3. [employer] Comprehensive Shoulder 40 mm glenosphere with C offset inferior  4. Bi[employer] Comprehensive Shoulder 25 mm medium augmented baseplate with 20 mm central screw, 20(A) and 30 (P) mm x 4.75 locking screws superiorly, 15mm locking screws x 2 inferior    BRIEF PATIENT HISTORY: This patient is known to me from clinic where we have discussed the radiographic and physical exam findings. The patient's shoulder has been causing lifestyle limiting pain and he feels that nonoperative management has not adequately relieved these symptoms. Therefore, the patient wished to consider surgical options.  Because of the profound glenoid retroversion and glenoid bone loss we  discussed reverse total shoulder arthroplasty including the  risks and benefits and perioperative rehabilitation plan. The patient had the opportunity for questions to be answered and wished to proceed with surgery. Informed consent was completed.     DESCRIPTION OF PROCEDURE: The patient was identified in the preoperative area and the correct left shoulder was marked for surgery. The patient was provided an interscalene block by our anesthesia colleagues.  He was taken to the operating room where he was surrendered to general endotracheal anesthesia.  He was moved to the operating table in the beachchair position with all bony prominences well padded. The head was placed in a head reyez with the neck in neutral position. The left upper extremity was prepped and draped in the usual sterile fashion. A timeout was held in accordance with hospital policy, confirming correct patient, site, side, procedure and administration of IV antibiotics prior to incision. I completed a deltopectoral incision and approach through the previous incision line. I identified the cephalic vein and brought this medially. I came underneath the deltoid and freed the subdeltoid adhesions. The subacromial space was quite narrow and the humeral head was riding up underneath the acromion.  In addition the humeral head was subluxed posteriorly on the markedly retroverted glenoid.  I palpated the axillary nerve medially and laterally performing a tug test confirming location and continuity of the nerve. This was performed multiple times throughout the procedure including once prior to closure. I identified the anterior circumflex humeral vessels and ligated and divided these.  I identified the location of the staples in the anterior aspect of the greater tuberosity.  These were covered with bone but were found without great difficulty.  I used an osteotome to elevate the staples such that they could be removed from their intraosseous location.  This left somewhat of avoid in the proximal humerus such that  I felt an 83 mm stem would be most appropriate.  I then released the capsule and subscapularis tendon off the anterior inferior humerus where the anatomy was quite altered from a bony standpoint.  This anatomy was altered by the previous subscapularis or lesser tuberosity transfer.  I identified the long head of the biceps tendon which was somewhat covered over with ossification from the previous surgery.  I opened this canal and tenotomized the biceps and then tenodesed it to the lateral aspect of the upper pec insertion.  After releasing the capsule inferiorly over the very large humeral osteophyte I dislocated the humerus anteriorly.  There was eburnated bone over the humeral head. The greater tuberosity was somewhat altered by previous surgery and the rotator cuff was quite thin.  The proximal humeral anatomy was markedly altered by the massive humeral osteophyte.  I entered the humeral canal and reamed up to a size 14. I cut the humeral head in 30 degrees of retroversion. I prepared the proximal humerus for a size 14 reverse stem.  I used a rongeur and osteotomes to remove the humeral head and restore some premorbid anatomy to the proximal humerus.  I then turned my attention to the glenoid. I performed anterior inferior capsular releases and removed the labrum circumferentially.  There was a very large loose body in the inferior aspect of the joint which measured approximately 4 cm x 2-1/2 cm.  I prepared the glenoid for the augmented baseplate which was impacted and screws were placed.  I removed excess bone or osteophyte off of the glenoid.  I then applied the 40 mm glenosphere with C offset inferior and trialed a polyethylene liner. A standard tray and standard liner provided forward elevation to 165 degrees with external rotation at the side to 50 without booking anteriorly. There was also internal rotation to the abdomen and contralateral shoulder without impingement. I removed all trial humeral  components. I impacted the stem in the appropriate version. I impacted the final polyethylene liner with the same range of motion as above.  There was no adequate capsule or subscapularis which could reasonably reach the humerus to provide a capsular repair.  I then copiously irrigated the wound. I irrigated the wound then with 15 mL a sterile Betadine and 500 mL of saline. This was then irrigated out with saline.  I placed 1 g of Vanco powder in the deep aspect of the wound.  The wound was then closed in layered fashion with staples at skin.  A soft sterile dressing were repaired. The arm was placed into an abduction sling and the patient was extubated and transported to recovery in stable condition. There were no apparent intraoperative complications.     POSTOPERATIVE PLAN:   1. The patient will be admitted to the Orthopedic Service and will begin physical therapy to mobilize out of bed.There will be no shoulder range of motion for 4-6 weeks but the patient can begin immediate hand, wrist and elbow range of motion. We will use aspirin for deep venous thrombosis prophylaxis.   2. The patient will be seen by me in the clinic for wound check at 2 weeks.     PATIENCE CUI MD

## 2022-09-21 NOTE — PLAN OF CARE
Pt arrived to the unit at 1905 from PACU.    VS: Temp: 98.2  F (36.8  C) Temp src: Oral BP: 133/69 Pulse: 77   Resp: 16 SpO2: 95 % O2 Device: Nasal cannula Oxygen Delivery: 1.5 LPM     O2: 95% on 1.5LPM via nasal cannula. Pt sats around 90-91% on room air. Pt denies SOB and CP, no cough present. Lung sounds diminished but clear bilaterally.   Output: Voids spontaneously and without difficulty. PVR of 216ml after first void post op   Last BM: 09/20/2022 prior to surgery   Activity: Assist x1 w/ gait belt. Shoulder immobilizer, NWB to LUE. DO NOT remove sling/immobilizer for skin checks.   Skin: L) shoulder surgical incision, scattered bruising to BUEs   Pain: Managed with scheduled tylenol and nerve block from surgery, pt has PRN oxycodone available as well   Neuro: A & O x4, reports numbness and tingling to LUE from nerve block   Dressing: CDI   Diet: Regular, tolerating preston crackers, water and coffee   LDA: R) PIV infusing LR @ 125ml/hr. Hemovac L) shoulder with 190ml of output this shift (from [phone]), ortho resident on call notified.   Equipment: IV pump/pole, personal belongings, call light, gait belt, shoulder immobilizer, PCDs.   Plan: Continue with plan of care   Additional Info:

## 2022-09-22 VITALS
HEIGHT: 66 IN | TEMPERATURE: 100.2 F | HEART RATE: 83 BPM | BODY MASS INDEX: 33.94 KG/M2 | OXYGEN SATURATION: 96 % | RESPIRATION RATE: 16 BRPM | SYSTOLIC BLOOD PRESSURE: 111 MMHG | DIASTOLIC BLOOD PRESSURE: 60 MMHG | WEIGHT: 211.2 LBS

## 2022-09-22 LAB
ANION GAP SERPL CALCULATED.3IONS-SCNC: 2 MMOL/L (ref 3–14)
BUN SERPL-MCNC: 14 MG/DL (ref 7–30)
CALCIUM SERPL-MCNC: 8.5 MG/DL (ref 8.5–10.1)
CHLORIDE BLD-SCNC: 104 MMOL/L (ref 94–109)
CO2 SERPL-SCNC: 30 MMOL/L (ref 20–32)
CREAT SERPL-MCNC: 0.77 MG/DL (ref 0.66–1.25)
GFR SERPL CREATININE-BSD FRML MDRD: >90 ML/MIN/1.73M2
GLUCOSE BLD-MCNC: 128 MG/DL (ref 70–99)
GLUCOSE BLDC GLUCOMTR-MCNC: 123 MG/DL (ref 70–99)
HGB BLD-MCNC: 11.9 G/DL (ref 13.3–17.7)
MAGNESIUM SERPL-MCNC: 2.2 MG/DL (ref 1.6–2.3)
POTASSIUM BLD-SCNC: 4 MMOL/L (ref 3.4–5.3)
SODIUM SERPL-SCNC: 136 MMOL/L (ref 133–144)

## 2022-09-22 PROCEDURE — 82962 GLUCOSE BLOOD TEST: CPT

## 2022-09-22 PROCEDURE — 250N000013 HC RX MED GY IP 250 OP 250 PS 637: Performed by: INTERNAL MEDICINE

## 2022-09-22 PROCEDURE — 80048 BASIC METABOLIC PNL TOTAL CA: CPT | Performed by: INTERNAL MEDICINE

## 2022-09-22 PROCEDURE — 85018 HEMOGLOBIN: CPT | Performed by: ORTHOPAEDIC SURGERY

## 2022-09-22 PROCEDURE — 36415 COLL VENOUS BLD VENIPUNCTURE: CPT | Performed by: INTERNAL MEDICINE

## 2022-09-22 PROCEDURE — 83735 ASSAY OF MAGNESIUM: CPT | Performed by: INTERNAL MEDICINE

## 2022-09-22 PROCEDURE — 99214 OFFICE O/P EST MOD 30 MIN: CPT | Performed by: INTERNAL MEDICINE

## 2022-09-22 PROCEDURE — 250N000013 HC RX MED GY IP 250 OP 250 PS 637: Performed by: ORTHOPAEDIC SURGERY

## 2022-09-22 RX ADMIN — SENNOSIDES AND DOCUSATE SODIUM 1 TABLET: 8.6; 5 TABLET ORAL at 08:32

## 2022-09-22 RX ADMIN — METFORMIN HYDROCHLORIDE 500 MG: 500 TABLET, FILM COATED ORAL at 08:31

## 2022-09-22 RX ADMIN — MULTIPLE VITAMINS W/ MINERALS TAB 1 TABLET: TAB at 08:31

## 2022-09-22 RX ADMIN — ACETAMINOPHEN 975 MG: 325 TABLET, FILM COATED ORAL at 05:33

## 2022-09-22 RX ADMIN — OXYCODONE HYDROCHLORIDE 10 MG: 10 TABLET ORAL at 02:22

## 2022-09-22 RX ADMIN — OXYCODONE HYDROCHLORIDE 10 MG: 10 TABLET ORAL at 06:47

## 2022-09-22 RX ADMIN — ATORVASTATIN CALCIUM 10 MG: 10 TABLET, FILM COATED ORAL at 08:31

## 2022-09-22 RX ADMIN — LISINOPRIL 10 MG: 5 TABLET ORAL at 08:31

## 2022-09-22 RX ADMIN — CLOTRIMAZOLE: 0.01 CREAM TOPICAL at 08:33

## 2022-09-22 RX ADMIN — ASPIRIN 162 MG: 81 TABLET, COATED ORAL at 08:30

## 2022-09-22 ASSESSMENT — ACTIVITIES OF DAILY LIVING (ADL)
ADLS_ACUITY_SCORE: 21
ADLS_ACUITY_SCORE: 21
ADLS_ACUITY_SCORE: 26
ADLS_ACUITY_SCORE: 26
ADLS_ACUITY_SCORE: 24

## 2022-09-22 NOTE — PLAN OF CARE
Goal Outcome Evaluation:    Plan of Care Reviewed With: patient     Overall Patient Progress: improving    Outcome Evaluation: Pt has been taking Oxy and ice and has been helpful. Mild swelling on the hand fingers , shoulder and continue to be elevated with sling  pillow. Plan of care discussed pt verbal understanding.    Patient vital signs are at baseline: Yes  Patient able to ambulate as they were prior to admission or with assist devices provided by therapies during their stay:  Yes  Patient MUST void prior to discharge:  Yes  Patient able to tolerate oral intake:  Yes  Pain has adequate pain control using Oral analgesics:  Yes  Does patient have an identified :  Yes  Has goal D/C date and time been discussed with patient:  Yes         VS: Temp: 97.7  F (36.5  C) Temp src: Oral BP: (!) 145/70 Pulse: 94   Resp: 16 SpO2: 96 % O2 Device: None (Room air)      O2: Clear lung sounds.   Output: Voiding well to bathroom   Last BM: 9/20/22   Activity: Stand by assist with gait belt   Up for meals? No   Skin: Left shoulder incision, armpit rash slight red, slight swollen shoulder to arm, fingers   Pain: Oxy, Tylenol and ice helpful   CMS: A/Ox4, denies numbness   Dressing: C/D/I Left shoulder   Diet: Reg   LDA: NONE   Equipment: Iv, pump, chair pt belonging   Plan: When closed suction drain is removed pt can be discharged, most likely today   Additional Info:

## 2022-09-22 NOTE — PROGRESS NOTES
[facility]    Hospitalist Progress Note    Date of Service (when I saw the patient): 09/22/2022    Assessment & Plan   Assessment & Plan     Iam Kaur is a 77 year old male admitted on 9/20/2022.  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  He underwent elective reverse left TSA with Dr. La on 9/20/2022.  Patient is currently in [facility] [place] for postop care.  Dr. La requested internal medicine consultation for postop medical management.  Mr. Kaur does not have any complaints.  He states his postop pain under good control with current medications.     S/p reverse left TSA  ---   POD #2  ---   Received prophylactic Ancef  ---   Further care per Ortho     DVT prophylaxis  ---   ASA     Acute postop left shoulder pain  ---   Oxycodone and Tylenol     T2DM  ---   Fasting glucose was 128 this am  ---   Resume PTA metformin     HLD  ---   Resume PTA atorvastatin and ASA     HTN  ---   Resume PTA lisinopril     Acute postop blood loss anemia  ---   Hemoglobin was 13.3 g on 9/15 ---> 11.9 g yesterday, not clinically significant  ---   Denies CP, SOB, lightheadedness or dizziness  ---   No indication for transfusions     Severe JAMAAL  ---   No longer uses CPAP after some weight loss              CODE STATUS:   Full  Disposition:   medically stable for d/c                 Concha Vaughn MD  [facility]  Securely message with the Vocera Web Console (learn more here)  Text page via [facility] Paging/Directory   Please see signed in provider for up to date coverage information  Hospitalist Service, GOLD TEAM 17          Interval History   No complaints,  Postop pain under good control  Uneventful night    Data  "reviewed today: I reviewed all new labs and imaging results over the last 24 hours.       Physical Exam   Temp: 100.2  F (37.9  C) Temp src: Oral BP: 111/60 Pulse: 83   Resp: 16 SpO2: 96 % O2 Device: None (Room air)    Vitals:    09/20/22 1247   Weight: 95.8 kg (211 lb 3.2 oz)     Vital Signs with Ranges  Temp:  [96.9  F (36.1  C)-100.2  F (37.9  C)] 100.2  F (37.9  C)  Pulse:  [83-94] 83  Resp:  [16] 16  BP: (111-145)/(54-70) 111/60  SpO2:  [96 %-98 %] 96 %  I/O last 3 completed shifts:  In: 320 [P.O.:320]  Out: 120 [Drains:120]    General: aao x 3, NAD.  HEENT:  NC/AT, PERRL, EOMI, neck supple, no thyromegaly, op clear, mmm.  CVS:  NL s 1 and s2, no m/r/g.  Lungs:  CTA B/L.   Abd:  Soft, + bs, NT, no rebound or gaurding, no fluid shift.  Ext:  Left shoulder is immobilized  Lymph:  No edema.  Neuro:  Nonfocal.  Musculoskeletal: No calf tenderness to palpation.    Skin:  No rash.  Psychiatry:  Mood and affect appropriate.    Medications     bupivacaine liposome (EXPAREL) LA inj was given in the infiltration site to produce post-op analgesia. Duration of action is up to 72 hours. Other \"susan\" meds should not be given for 96 hours except for lidocaine 4% patch. This is for INFORMATION ONLY.         bupivacaine (PF)  3 mL       acetaminophen  975 mg Oral Q8H     aspirin  162 mg Oral Daily     atorvastatin  10 mg Oral Daily     clotrimazole   Topical BID     insulin aspart  1-7 Units Subcutaneous TID AC     insulin aspart  1-5 Units Subcutaneous At Bedtime     lisinopril  10 mg Oral Daily     metFORMIN  500 mg Oral BID w/meals     multivitamin w/minerals  1 tablet Oral Daily     polyethylene glycol  17 g Oral Daily     senna-docusate  1 tablet Oral BID     sodium chloride (PF)  3 mL Intracatheter Q8H     triamcinolone  80 mg         Data   Recent Labs   Lab 09/22/22  0659 09/21/22  2209 09/21/22  1808 09/21/22  1338 09/21/22  0823 09/20/22  1740 09/20/22  1410   HGB 11.9*  --   --   --  11.9*  --   --      --   " --   --   --   --   --    POTASSIUM 4.0  --   --   --   --   --  4.1   CHLORIDE 104  --   --   --   --   --   --    CO2 30  --   --   --   --   --   --    BUN 14  --   --   --   --   --   --    CR 0.77  --   --   --   --   --  0.76   ANIONGAP 2*  --   --   --   --   --   --    KIRSTIE 8.5  --   --   --   --   --   --    * 100* 131*   < >  --    < >  --     < > = values in this interval not displayed.       No results found for this or any previous visit (from the past 24 hour(s)).

## 2022-09-22 NOTE — PROGRESS NOTES
"  Orthopaedic Surgery Daily Progress Note     Subjective: Iam Kaur is a 77 year old male POD # 2 L rTSA. Pain is well controlled. Block wearing off. Tolerating normal diet. No fever, chills. No chest pain or shortness of breath. No abdominal pain, nausea, vomiting. No diarrhea or constipation. No urinary difficulties.     Physical Exam   BP (!) 145/70 (BP Location: Right arm)   Pulse 94   Temp 97.7  F (36.5  C) (Oral)   Resp 16   Ht 1.676 m (5' 6\")   Wt 95.8 kg (211 lb 3.2 oz)   SpO2 96%   BMI 34.09 kg/m      Intake/Output Summary (Last 24 hours) at 9/21/2022 0622  Last data filed at 9/21/2022 0517  Gross per 24 hour   Intake 1000 ml   Output 1140 ml   Net -140 ml     General: Alert, well-appearing  in no acute distress.  Respiratory: Non-labored breathing.   Cardiovascular: Extremities warm and well perfused   Extremities: moving all four extremities.       Left UE:  -Sens: SILT m/u/r/axillary  -Motor: Fires EPL/FDS/FDP/IO  -Vasc:  2+ radial pulse    Dressing CDI    Skin: Rash in patient's axilla from patient's longstanding history of limited ROM in the left shoulder    Output by Drain (mL) 09/20/22 0700 - 09/20/22 1459 09/20/22 1500 - 09/20/22 2259 09/20/22 2300 - 09/21/22 0659 09/21/22 0700 - 09/21/22 1459 09/21/22 1500 - 09/21/22 2259 09/21/22 2300 - 09/22/22 0632   Closed/Suction Drain 1 Left;Anterior Shoulder Accordion 10 Hebrew  190 100 75 30 15       Labs    Complete Blood Count   Recent Labs   Lab 09/21/22  0823 09/15/22  0943   WBC  --  7.4   HGB 11.9* 13.3   PLT  --  284     Basic Metabolic Panel  Recent Labs   Lab 09/21/22  2209 09/21/22  1808 09/21/22  1338 09/20/22  1740 09/20/22  1410   POTASSIUM  --   --   --   --  4.1   CR  --   --   --   --  0.76   * 131* 122* 138*  --      Coagulation Profile  No lab results found in last 7 days.    Assessment/Plan:  Assessment and Plan:  Iam Kaur is a 77 year old male with s/p L rTSA with Dr. La 9/20/22. He is progressing as " expected with plans for likely discharge home today.     9/22 Update:  -Drain removed this AM    Orthopedic Surgery Primary  Activity:LUE in sling at all time  Weight bearing status: NWB LUE  Pain management: Transition from IV to PO as tolerated.    Antibiotics: Ancef x 24 hours  Diet: Begin with clear fluids and progress diet as tolerated.   DVT prophylaxis: ASA 30 days  Imaging: no further imaging needed  Labs: Monitor Hgb   Dressings: Keep clean, dry and intact   Drains: Can remove drain when output <30mL per shfit  Physical Therapy/Occupational Therapy: Eval and treat.  Consults: IM.  Follow-up: Clinic 2 weeks, 6 weeks w/ Dr La  Disposition: Likely home    Savannah Gallegos MD   Orthopaedic Surgery Resident  P: [phone]

## 2022-09-22 NOTE — DISCHARGE SUMMARY
Reviewed discharge paperwork with patient and reviewed medications. Oxycodone sent with patient. Patient had no concerns. All personal belongings sent with patient, including ice packs. Writer accompanied patient outside to where wife was waiting in car. Patient ambulated independently from room to car.

## 2022-09-28 ENCOUNTER — THERAPY VISIT (OUTPATIENT)
Dept: PHYSICAL THERAPY | Facility: CLINIC | Age: 77
End: 2022-09-28
Attending: INTERNAL MEDICINE
Payer: COMMERCIAL

## 2022-09-28 DIAGNOSIS — G89.18 ACUTE POSTOPERATIVE PAIN OF LEFT SHOULDER: ICD-10-CM

## 2022-09-28 DIAGNOSIS — M25.512 ACUTE POSTOPERATIVE PAIN OF LEFT SHOULDER: ICD-10-CM

## 2022-09-28 DIAGNOSIS — Z96.612 S/P REVERSE TOTAL SHOULDER ARTHROPLASTY, LEFT: ICD-10-CM

## 2022-09-28 PROCEDURE — 97110 THERAPEUTIC EXERCISES: CPT | Mod: GP | Performed by: PHYSICAL THERAPIST

## 2022-09-28 PROCEDURE — 97161 PT EVAL LOW COMPLEX 20 MIN: CPT | Mod: GP | Performed by: PHYSICAL THERAPIST

## 2022-09-28 NOTE — PROGRESS NOTES
Physical Therapy Initial Evaluation  Subjective:  The history is provided by the patient. No  was used.   Patient Health History  Iam Kaur being seen for S/P L TSA reverse.     Date of Onset: DOS: 9/20/22.   Problem occurred: OA   Pain score: Ranges 3-5/10.  General health as reported by patient is good.  Pertinent medical history includes: cancer and diabetes.   Red flags:  None as reported by patient.  Medical allergies: none.   Surgeries include:  Cancer surgery and orthopedic surgery. Other surgery history details: colon/rectal, L TKA, shoulder ligament repairs in [facility] .    Current medications:  Pain medication. Other medications details: medformin, cholesterol. tylenol and oxycodin.    Current occupation is Retired.                     Therapist Generated HPI Evaluation         Type of problem:  Left shoulder.    This is a new condition.  Condition occurred with:  Degenerative joint disease.  Where condition occurred: for unknown reasons.  Patient reports pain:  Anterior.  Pain quality: throbbing.   Pain is worse in the P.M..  Since onset symptoms are unchanged.  Associated symptoms:  Loss of motion/stiffness and loss of strength. Exacerbated by: immobilized in sling.  and relieved by rest and analgesics.      Barriers include:  None as reported by patient.                        Objective:  Standing Alignment:    Cervical/Thoracic:  Forward head and thoracic kyphosis increased  Shoulder/UE:  Rounded shoulders, protracted scapula L and protracted scapula R              Gait:      Deviations:  Lumbar:  Trunk flexion    Flexibility/Screens:   Positive screens:  Shoulder  Upper Extremity:    Decreased left upper extremity flexibility at:  Pectoralis Major and Pectoralis Minor    Decreased right upper extremity flexibility present at:  Pectoralis Major and Pectoralis Minor                           Shoulder Evaluation:  ROM:    PROM:    Flexion:  Left:  60          Abduction:   Left:  40        Internal Rotation:  Left:  60      External Rotation:  Left:  0                        Strength:  not assessed                      Stability Testing:  not assessed      Special Tests:  not assessed      Palpation:  Palpation assessed shoulder: TTP Ant Left shoulder incisions.      Mobility Tests:  not assessed                                                 General     ROS    Assessment/Plan:    Patient is a 77 year old male with left side shoulder complaints.    Patient has the following significant findings with corresponding treatment plan.                Diagnosis 1:  S/P Reverse L TSA  Pain -  manual therapy, splint/taping/bracing/orthotics, self management, education and home program  Decreased ROM/flexibility - manual therapy, therapeutic exercise, therapeutic activity and home program  Decreased joint mobility - manual therapy, therapeutic exercise, therapeutic activity and home program  Decreased strength - therapeutic exercise, therapeutic activities and home program  Inflammation - self management/home program  Impaired muscle performance - neuro re-education and home program  Decreased function - therapeutic activities and home program  Impaired posture - neuro re-education, therapeutic activities and home program    Therapy Evaluation Codes:   1) History comprised of:   Personal factors that impact the plan of care:      None.    Comorbidity factors that impact the plan of care are:      Cancer and Diabetes.     Medications impacting care: Metformin, tylenol and oxycotin.  2) Examination of Body Systems comprised of:   Body structures and functions that impact the plan of care:      Shoulder.   Activity limitations that impact the plan of care are:      Bathing, Cooking, Driving, Dressing, Grasping, Lifting, Sleeping and Reaching.  3) Clinical presentation characteristics are:   Stable/Uncomplicated.  4) Decision-Making    Low complexity using standardized patient assessment instrument  and/or measureable assessment of functional outcome.  Cumulative Therapy Evaluation is: Low complexity.    Previous and current functional limitations:  (See Goal Flow Sheet for this information)    Short term and Long term goals: (See Goal Flow Sheet for this information)     Communication ability:  Patient appears to be able to clearly communicate and understand verbal and written communication and follow directions correctly.  Treatment Explanation - The following has been discussed with the patient:   RX ordered/plan of care  Anticipated outcomes  Possible risks and side effects  This patient would benefit from PT intervention to resume normal activities.   Rehab potential is good.    Frequency:  1 X week, once daily  Duration:  for 12 weeks  Discharge Plan:  Achieve all LTG.  Independent in home treatment program.  Return to previous functional level by discharge.  Reach maximal therapeutic benefit.    Please refer to the daily flowsheet for treatment today, total treatment time and time spent performing 1:1 timed codes.

## 2022-09-29 NOTE — PROGRESS NOTES
[facility]    OUTPATIENT Physical Therapy ORTHOPEDIC EVALUATION  PLAN OF TREATMENT FOR OUTPATIENT REHABILITATION  (COMPLETE FOR INITIAL CLAIMS ONLY)  Patient's Last Name, First Name, M.I.  YOB: 1945  Iam Kaur    Provider s Name:  [facility]   Medical Record No.  [id number]   Start of Care Date:  09/28/22   Onset Date:  09/20/22 (DOS:)    Type:     _X__PT   ___OT Medical Diagnosis:    Encounter Diagnoses   Name Primary?    Acute postoperative pain of left shoulder     S/P reverse total shoulder arthroplasty, left         Treatment Diagnosis:  S/P L TSA reverse        Goals:     09/28/22 0500   Body Part   Goals listed below are for S/P L TSA   Goal #1   Goal #1 reaching   Previous Functional Level No restrictions   Current Functional Level Cannot reach    STG Target Performance Reach ;overhead   Performance level 90 degrees   Rationale for independent personal hygiene;for dressing;for bathing;for meal preparation   Due date 11/09/22   LTG Target Performance Reach;overhead   Performance Level Flex = 120 degrees   Rationale for independent personal hygiene;for dressing;for bathing;for meal preparation;for safe driving, hook seat belt, reach shift lever and turn signal, using both hands on steering wheel   Due date 12/21/22       Therapy Frequency:  1 x week  Predicted Duration of Therapy Intervention:  12 weeks    Frida Villeda, PT                 I CERTIFY THE NEED FOR THESE SERVICES FURNISHED UNDER        THIS PLAN OF TREATMENT AND WHILE UNDER MY CARE     (Physician co-signature of this document indicates review and certification of the therapy plan).                     Certification Date From:  09/28/22   Certification Date To:  12/26/22    Referring Provider:  Iram La    Initial Assessment        See Epic Evaluation SOC Date:  09/28/22

## 2022-10-03 ENCOUNTER — OFFICE VISIT (OUTPATIENT)
Dept: ORTHOPEDICS | Facility: CLINIC | Age: 77
End: 2022-10-03
Payer: COMMERCIAL

## 2022-10-03 DIAGNOSIS — M19.012 ARTHROPATHY OF LEFT SHOULDER: Primary | ICD-10-CM

## 2022-10-03 PROCEDURE — 99024 POSTOP FOLLOW-UP VISIT: CPT | Performed by: ORTHOPAEDIC SURGERY

## 2022-10-03 NOTE — PROGRESS NOTES
CHIEF CONCERN: Status post left reverse total shoulder arthroplasty  DATE OF SURGERY: 9/20/22    HISTORY OF PRESENT ILLNESS: Mr. Kaur is an extremely pleasant 77 year-old male who is 2 weeks status post the above procedure.    EXAM:  Pleasant adult male in NAD  Respirations even and unlabored.  Left upper extremity: Incision clean, dry, and intact. Distally neurovascularly intact without deficits. Shoulder range of motion is manageable at the side.    IMAGING: None new    ASSESSMENT:  1. Two weeks status post above procedure    PLAN:    Range of Motion: Hand, wrist, elbow motion per operative note. Only shoulder motion for self cares.    Sling: Continue sling except for bathing/dressing/rehab    Pain medication: NSAIDs and Tylenol PRN    Follow up: in 4 weeks.  Will discontinue sling at that time and initiate AROM. Plan to advance to strengthening at 3 months

## 2022-10-03 NOTE — NURSING NOTE
Reason For Visit:   Chief Complaint   Patient presents with     Surgical Followup     2 wks s/p left shoulder removal of deep implant, left reverse total shoulder arthroplasty DOS: 9/20/22 - doing well.        PCP: Barak Cameron  Ref: Dr. Kelley     ?  No  Occupation Retired.  Currently working? No.  Work status?  Retired.  Date of injury: Gradual onset  Type of injury: Gradual onset.  Date of surgery: Left arthroscopic repair 1965; right arthroscopic repair 1963; s/p left shoulder removal of deep implant, left reverse total shoulder arthroplasty DOS: 9/20/22  Smoker: No  Request smoking cessation information: No     Left hand dominant       There were no vitals taken for this visit.    Alyssa Pearce, ATC

## 2022-10-03 NOTE — LETTER
10/3/2022         RE: Iam Kaur  [address]  [facility] [address]        Dear Colleague,    Thank you for referring your patient, Iam Kaur, to the [facility]. Please see a copy of my visit note below.    CHIEF CONCERN: Status post left reverse total shoulder arthroplasty  DATE OF SURGERY: 9/20/22    HISTORY OF PRESENT ILLNESS: Mr. Kaur is an extremely pleasant 77 year-old male who is 2 weeks status post the above procedure.    EXAM:  Pleasant adult male in NAD  Respirations even and unlabored.  Left upper extremity: Incision clean, dry, and intact. Distally neurovascularly intact without deficits. Shoulder range of motion is manageable at the side.    IMAGING: None new    ASSESSMENT:  1. Two weeks status post above procedure    PLAN:    Range of Motion: Hand, wrist, elbow motion per operative note. Only shoulder motion for self cares.    Sling: Continue sling except for bathing/dressing/rehab    Pain medication: NSAIDs and Tylenol PRN    Follow up: in 4 weeks.  Will discontinue sling at that time and initiate AROM. Plan to advance to strengthening at 3 months          Again, thank you for allowing me to participate in the care of your patient.        Sincerely,        Iram La MD

## 2022-10-05 ENCOUNTER — THERAPY VISIT (OUTPATIENT)
Dept: PHYSICAL THERAPY | Facility: CLINIC | Age: 77
End: 2022-10-05
Payer: COMMERCIAL

## 2022-10-05 DIAGNOSIS — G89.18 ACUTE POSTOPERATIVE PAIN OF LEFT SHOULDER: Primary | ICD-10-CM

## 2022-10-05 DIAGNOSIS — Z96.612 S/P REVERSE TOTAL SHOULDER ARTHROPLASTY, LEFT: ICD-10-CM

## 2022-10-05 DIAGNOSIS — M25.512 ACUTE POSTOPERATIVE PAIN OF LEFT SHOULDER: Primary | ICD-10-CM

## 2022-10-05 PROCEDURE — 97110 THERAPEUTIC EXERCISES: CPT | Mod: GP | Performed by: PHYSICAL THERAPIST

## 2022-10-09 ENCOUNTER — HEALTH MAINTENANCE LETTER (OUTPATIENT)
Age: 77
End: 2022-10-09

## 2022-10-12 ENCOUNTER — THERAPY VISIT (OUTPATIENT)
Dept: PHYSICAL THERAPY | Facility: CLINIC | Age: 77
End: 2022-10-12
Payer: COMMERCIAL

## 2022-10-12 DIAGNOSIS — G89.18 ACUTE POSTOPERATIVE PAIN OF LEFT SHOULDER: Primary | ICD-10-CM

## 2022-10-12 DIAGNOSIS — Z96.612 S/P REVERSE TOTAL SHOULDER ARTHROPLASTY, LEFT: ICD-10-CM

## 2022-10-12 DIAGNOSIS — M25.512 ACUTE POSTOPERATIVE PAIN OF LEFT SHOULDER: Primary | ICD-10-CM

## 2022-10-12 PROCEDURE — 97110 THERAPEUTIC EXERCISES: CPT | Mod: GP | Performed by: PHYSICAL THERAPIST

## 2022-10-19 ENCOUNTER — THERAPY VISIT (OUTPATIENT)
Dept: PHYSICAL THERAPY | Facility: CLINIC | Age: 77
End: 2022-10-19
Payer: COMMERCIAL

## 2022-10-19 DIAGNOSIS — Z96.612 S/P REVERSE TOTAL SHOULDER ARTHROPLASTY, LEFT: ICD-10-CM

## 2022-10-19 DIAGNOSIS — M25.512 ACUTE POSTOPERATIVE PAIN OF LEFT SHOULDER: Primary | ICD-10-CM

## 2022-10-19 DIAGNOSIS — G89.18 ACUTE POSTOPERATIVE PAIN OF LEFT SHOULDER: Primary | ICD-10-CM

## 2022-10-19 PROCEDURE — 97112 NEUROMUSCULAR REEDUCATION: CPT | Mod: GP | Performed by: PHYSICAL THERAPIST

## 2022-10-19 PROCEDURE — 97110 THERAPEUTIC EXERCISES: CPT | Mod: GP | Performed by: PHYSICAL THERAPIST

## 2022-10-26 ENCOUNTER — THERAPY VISIT (OUTPATIENT)
Dept: PHYSICAL THERAPY | Facility: CLINIC | Age: 77
End: 2022-10-26
Payer: COMMERCIAL

## 2022-10-26 DIAGNOSIS — M25.512 ACUTE POSTOPERATIVE PAIN OF LEFT SHOULDER: Primary | ICD-10-CM

## 2022-10-26 DIAGNOSIS — Z96.612 S/P REVERSE TOTAL SHOULDER ARTHROPLASTY, LEFT: ICD-10-CM

## 2022-10-26 DIAGNOSIS — G89.18 ACUTE POSTOPERATIVE PAIN OF LEFT SHOULDER: Primary | ICD-10-CM

## 2022-10-26 PROCEDURE — 97112 NEUROMUSCULAR REEDUCATION: CPT | Mod: GP | Performed by: PHYSICAL THERAPIST

## 2022-10-26 PROCEDURE — 97110 THERAPEUTIC EXERCISES: CPT | Mod: GP | Performed by: PHYSICAL THERAPIST

## 2022-10-26 NOTE — PROGRESS NOTES
Subjective:  HPI  Physical Exam                    Objective:  System    Physical Exam    General     ROS    Assessment/Plan:    PROGRESS  REPORT    Progress reporting period is from 9/28/22 to 10/26/22.       SUBJECTIVE  Subjective changes noted by patient:  Filemon reports minimal to no pain left shoulder. Easier to use with dressing and  ADLs. Would like to depart for [place] Nov 19, 2022.     Current Pain level: 0/10.     Initial Pain level: 4/10.   Changes in function:  Yes (See Goal flowsheet attached for changes in current functional level)  Adverse reaction to treatment or activity: None    OBJECTIVE  Changes noted in objective findings:   Began AROM at 4 weeks post op. At 5 weeks S/P L Reverse TSA, AROM Flex = 90, ABD = 60, IR/Ext = L buttock and ER/Flex = L occiput. PROM Flex = 125, ABD = 90 and ER = 30 degrees.     ASSESSMENT/PLAN  Updated problem list and treatment plan: Diagnosis 1:  S/P L Reverse TSA    Pain -  manual therapy, self management, education and home program  Decreased ROM/flexibility - manual therapy, therapeutic exercise, therapeutic activity and home program  Decreased joint mobility - manual therapy, therapeutic exercise, therapeutic activity and home program  Decreased strength - therapeutic exercise, therapeutic activities and home program  Inflammation - self management/home program  Impaired muscle performance - neuro re-education and home program  Decreased function - therapeutic activities and home program  STG/LTGs have been met or progress has been made towards goals:  Yes (See Goal flow sheet completed today.)  Assessment of Progress: The patient's condition is improving.  Patient is meeting short term goals and is progressing towards long term goals.  Self Management Plans:  Patient has been instructed in a home treatment program.  Patient  has been instructed in self management of symptoms.  The family/caregiver has been instructed in home continuation of care.  I have  re-evaluated this patient and find that the nature, scope, duration and intensity of the therapy is appropriate for the medical condition of the patient.  Iam continues to require the following intervention to meet STG and LTG's:  PT    Recommendations:  This patient would benefit from continued therapy.     Frequency:  1 X week, once daily  Duration:  for 4 weeks then 1 x week every other week for 4 weeks.        Please refer to the daily flowsheet for treatment today, total treatment time and time spent performing 1:1 timed codes.

## 2022-10-31 ENCOUNTER — OFFICE VISIT (OUTPATIENT)
Dept: ORTHOPEDICS | Facility: CLINIC | Age: 77
End: 2022-10-31
Payer: COMMERCIAL

## 2022-10-31 DIAGNOSIS — M75.122 NONTRAUMATIC COMPLETE TEAR OF LEFT ROTATOR CUFF: Primary | ICD-10-CM

## 2022-10-31 PROCEDURE — 99024 POSTOP FOLLOW-UP VISIT: CPT | Performed by: ORTHOPAEDIC SURGERY

## 2022-10-31 NOTE — PROGRESS NOTES
CHIEF CONCERN: Status post left reverse total shoulder arthroplasty  DATE OF SURGERY: 9/20/22    HISTORY OF PRESENT ILLNESS: Mr. Kaur is an extremely pleasant 77 year-old male who is 6 weeks status post the above procedure. He states he is doing rather well.     EXAM:  Pleasant adult male in NAD  Respirations even and unlabored.  Left upper extremity:  Distally neurovascularly intact without deficits. Shoulder range of motion is active FE to 90 and ER to 20.    IMAGING: None new    ASSESSMENT:  1. Two weeks status post above procedure    PLAN:    Range of Motion: Hand, wrist, elbow motion per operative note. Only shoulder motion for self cares.    Sling: Continue sling except for bathing/dressing/rehab    Pain medication: NSAIDs and Tylenol PRN    Follow up: in 4 weeks.  Will discontinue sling at that time and initiate AROM. Plan to advance to strengthening at 3 months

## 2022-10-31 NOTE — NURSING NOTE
Reason For Visit:   Chief Complaint   Patient presents with     Surgical Followup     6 week s/p left shoulder removal of deep implant, left reverse total shoulder arthroplasty DOS: 9/20/22 - doing good       PCP: Barak Cameron  Ref: Dr. Kelley     ?  No  Occupation Retired.  Currently working? No.  Work status?  Retired.  Date of injury: Gradual onset  Type of injury: Gradual onset.  Date of surgery: Left arthroscopic repair 1965; right arthroscopic repair 1963; s/p left shoulder removal of deep implant, left reverse total shoulder arthroplasty DOS: 9/20/22  Smoker: No  Request smoking cessation information: No    Left hand dominant    SANE score  Affected shoulder: Left  Right shoulder SANE: 90  Left shoulder SANE: 60-70    There were no vitals taken for this visit.    ZEKE HaqueT

## 2022-10-31 NOTE — LETTER
10/31/2022         RE: Iam Kaur  [address]  [place] [facility] [address]        Dear Colleague,    Thank you for referring your patient, Iam Kaur, to the [facility]. Please see a copy of my visit note below.    CHIEF CONCERN: Status post left reverse total shoulder arthroplasty  DATE OF SURGERY: 9/20/22    HISTORY OF PRESENT ILLNESS: Mr. Kaur is an extremely pleasant 77 year-old male who is 6 weeks status post the above procedure. He states he is doing rather well.     EXAM:  Pleasant adult male in NAD  Respirations even and unlabored.  Left upper extremity:  Distally neurovascularly intact without deficits. Shoulder range of motion is active FE to 90 and ER to 20.    IMAGING: None new    ASSESSMENT:  1. Two weeks status post above procedure    PLAN:    Range of Motion: Hand, wrist, elbow motion per operative note. Only shoulder motion for self cares.    Sling: Continue sling except for bathing/dressing/rehab    Pain medication: NSAIDs and Tylenol PRN    Follow up: in 4 weeks.  Will discontinue sling at that time and initiate AROM. Plan to advance to strengthening at 3 months          Again, thank you for allowing me to participate in the care of your patient.        Sincerely,        Iram La MD

## 2022-11-01 ENCOUNTER — THERAPY VISIT (OUTPATIENT)
Dept: PHYSICAL THERAPY | Facility: CLINIC | Age: 77
End: 2022-11-01
Payer: COMMERCIAL

## 2022-11-01 DIAGNOSIS — M25.512 ACUTE POSTOPERATIVE PAIN OF LEFT SHOULDER: Primary | ICD-10-CM

## 2022-11-01 DIAGNOSIS — G89.18 ACUTE POSTOPERATIVE PAIN OF LEFT SHOULDER: Primary | ICD-10-CM

## 2022-11-01 DIAGNOSIS — Z96.612 S/P REVERSE TOTAL SHOULDER ARTHROPLASTY, LEFT: ICD-10-CM

## 2022-11-01 PROCEDURE — 97112 NEUROMUSCULAR REEDUCATION: CPT | Mod: GP | Performed by: PHYSICAL THERAPIST

## 2022-11-01 PROCEDURE — 97110 THERAPEUTIC EXERCISES: CPT | Mod: GP | Performed by: PHYSICAL THERAPIST

## 2022-11-15 ENCOUNTER — THERAPY VISIT (OUTPATIENT)
Dept: PHYSICAL THERAPY | Facility: CLINIC | Age: 77
End: 2022-11-15
Payer: COMMERCIAL

## 2022-11-15 DIAGNOSIS — Z96.612 S/P REVERSE TOTAL SHOULDER ARTHROPLASTY, LEFT: ICD-10-CM

## 2022-11-15 DIAGNOSIS — G89.18 ACUTE POSTOPERATIVE PAIN OF LEFT SHOULDER: Primary | ICD-10-CM

## 2022-11-15 DIAGNOSIS — M25.512 ACUTE POSTOPERATIVE PAIN OF LEFT SHOULDER: Primary | ICD-10-CM

## 2022-11-15 PROCEDURE — 97110 THERAPEUTIC EXERCISES: CPT | Mod: GP | Performed by: PHYSICAL THERAPIST

## 2022-11-15 PROCEDURE — 97112 NEUROMUSCULAR REEDUCATION: CPT | Mod: GP | Performed by: PHYSICAL THERAPIST

## 2022-11-15 NOTE — PROGRESS NOTES
Subjective:  HPI  Physical Exam                    Objective:  System    Physical Exam    General     ROS    Assessment/Plan:    DISCHARGE REPORT    Progress reporting period is from 9/28/22 to 11/15/22.       SUBJECTIVE  Subjective changes noted by patient:  Filemon reports that he experiences minimal pain left shoulder and is pleased with the result. He is departing for [place] for the winter months next Friday and plans to continues with outlined HEP.       Current Pain level: 0/10.     Initial Pain level: 4/10.   Changes in function:  Yes (See Goal flowsheet attached for changes in current functional level)  Adverse reaction to treatment or activity: None    OBJECTIVE  Changes noted in objective findings:   At 8 weeks S/P L Reverse TSA: AROM L Flex = 105, ABD = 90, IR/Ext = L buttock and ER/Flex = Occiput. He is performing all ADLs independently and his HEP was advanced to include strengthening with light weights (Red and Yellow TB and 8-16 oz weights).      ASSESSMENT/PLAN  Updated problem list and treatment plan: Diagnosis 1:  S/P L Reverse TSA  Pain -  manual therapy, self management, education and home program  Decreased ROM/flexibility - manual therapy, therapeutic exercise and home program  Decreased joint mobility - manual therapy, therapeutic exercise and home program  Decreased strength - therapeutic exercise, therapeutic activities and home program  Inflammation - self management/home program  Impaired muscle performance - neuro re-education  Decreased function - therapeutic activities and home program  Impaired posture - neuro re-education  STG/LTGs have been met or progress has been made towards goals:  Yes (See Goal flow sheet completed today.)  Assessment of Progress: The patient's condition is improving.  Patient is meeting short term goals and is progressing towards long term goals.  Self Management Plans:  Patient is independent in a home treatment program.  Patient is independent in self management  of symptoms.  The family/caregiver has been instructed in home continuation of care.  I have re-evaluated this patient and find that the nature, scope, duration and intensity of the therapy is appropriate for the medical condition of the patient.  Iam continues to require the following intervention to meet STG and LTG's:  PT    Recommendations:  This patient is ready to be discharged from therapy and continue their home treatment program.    Please refer to the daily flowsheet for treatment today, total treatment time and time spent performing 1:1 timed codes.

## 2022-11-26 ENCOUNTER — HEALTH MAINTENANCE LETTER (OUTPATIENT)
Age: 77
End: 2022-11-26

## 2022-12-01 NOTE — PLAN OF CARE
[facility]      OUTPATIENT OCCUPATIONAL THERAPY  EVALUATION  PLAN OF TREATMENT FOR OUTPATIENT REHABILITATION  (COMPLETE FOR INITIAL CLAIMS ONLY)  Patient's Last Name, First Name, M.I.  YOB: 1945  VincentIam  W                          Provider's Name  [facility] Medical Record No.  [id number]                               Onset Date:  09/21/22   Start of Care Date:  09/21/22     Type:     ___PT   _X_OT   ___SLP Medical Diagnosis:  s/p L reverse TSA                        OT Diagnosis:  decreased I with ADL   Visits from SOC:  1   _________________________________________________________________________________  Plan of Treatment/Functional Goals    Planned Interventions: ADL retraining, IADL retraining, bed mobility training, transfer training, home program guidelines   Goals: See Occupational Therapy Goals on Care Plan in [facility] electronic health record.    Therapy Frequency: One time eval and treatment  Predicted Duration of Therapy Intervention: 09/21/22  _________________________________________________________________________________    I CERTIFY THE NEED FOR THESE SERVICES FURNISHED UNDER        THIS PLAN OF TREATMENT AND WHILE UNDER MY CARE     (Physician co-signature of this document indicates review and certification of the therapy plan).               ,      Referring Physician: Aristeo Kelley MD            Initial Assessment        See Occupational Therapy evaluation dated 09/21/22 in Epic electronic health record.

## 2023-03-25 ENCOUNTER — HEALTH MAINTENANCE LETTER (OUTPATIENT)
Age: 78
End: 2023-03-25

## 2023-05-11 ENCOUNTER — OFFICE VISIT (OUTPATIENT)
Dept: ORTHOPEDICS | Facility: CLINIC | Age: 78
End: 2023-05-11
Payer: COMMERCIAL

## 2023-05-11 ENCOUNTER — ANCILLARY PROCEDURE (OUTPATIENT)
Dept: GENERAL RADIOLOGY | Facility: CLINIC | Age: 78
End: 2023-05-11
Attending: ORTHOPAEDIC SURGERY
Payer: COMMERCIAL

## 2023-05-11 DIAGNOSIS — Z96.612 STATUS POST REVERSE ARTHROPLASTY OF LEFT SHOULDER: Primary | ICD-10-CM

## 2023-05-11 DIAGNOSIS — G89.29 CHRONIC RIGHT SHOULDER PAIN: ICD-10-CM

## 2023-05-11 DIAGNOSIS — M25.511 CHRONIC RIGHT SHOULDER PAIN: ICD-10-CM

## 2023-05-11 PROCEDURE — 73030 X-RAY EXAM OF SHOULDER: CPT | Mod: LT | Performed by: RADIOLOGY

## 2023-05-11 PROCEDURE — 99213 OFFICE O/P EST LOW 20 MIN: CPT | Performed by: ORTHOPAEDIC SURGERY

## 2023-05-11 NOTE — NURSING NOTE
Reason For Visit:   Chief Complaint   Patient presents with     Follow Up     s/p left shoulder removal of deep implant, left reverse total shoulder arthroplasty DOS: 9/20/22       PCP: Barak Cameron  Ref: Dr. Kelley     ?  No  Occupation Retired.  Currently working? No.  Work status?  Retired.  Date of injury: Gradual onset  Type of injury: Gradual onset.  Date of surgery: Left arthroscopic repair 1965; right arthroscopic repair 1963; s/p left shoulder removal of deep implant, left reverse total shoulder arthroplasty DOS: 9/20/22  Smoker: No  Request smoking cessation information: No     Left hand dominant     SANE score  Affected shoulder: Left  Right shoulder SANE: 80  Left shoulder SANE: 95  There were no vitals taken for this visit.    Lisa Curiel, EMT

## 2023-05-11 NOTE — PROGRESS NOTES
CHIEF CONCERN: Status post left reverse total shoulder arthroplasty  DATE OF SURGERY: 9/20/22    HISTORY OF PRESENT ILLNESS: Mr. Kaur is an extremely pleasant 77 year-old male who is 9 months status post the above procedure. He reports that he is doing very well overall, and that he has returned to golfing.     EXAM:  Pleasant adult male in NAD  Respirations even and unlabored.  Left upper extremity:  Distally neurovascularly intact without deficits. Shoulder range of motion is active FE to 155, ER to 30, and IR to back pocket.    IMAGING:  Left shoulder XRs from today were reviewed. Left reverse total shoulder components are in good position and no evidence of lucency. Stable compared to postoperative films.    ASSESSMENT:  1. Nine months status post above procedure    PLAN:    Activities: Resume normal activities as tolerated. Limit weight lifting to below 15 lbs.     Pain medication: NSAIDs and Tylenol PRN    Follow up: Annually or PRN.    By signing my name below, I, Tracy Espinoza, attest that this documentation has been prepared under the direction of Iram La MD.  Electronically Signed: Jyothi Flor.    Provider Disclosure:  I agree with above History, Physical exam and Plan.  I have reviewed the content of the documentation and have edited it as needed. I have personally performed the services documented here and the documentation accurately represents those services and the decisions I have made.      Iram La MD

## 2023-05-11 NOTE — LETTER
5/11/2023         RE: Iam Kaur  [address]  [place] [address]        Dear Colleague,    Thank you for referring your patient, Iam Kaur, to the [facility]. Please see a copy of my visit note below.    CHIEF CONCERN: Status post left reverse total shoulder arthroplasty  DATE OF SURGERY: 9/20/22    HISTORY OF PRESENT ILLNESS: Mr. Kaur is an extremely pleasant 77 year-old male who is 9 months status post the above procedure. He reports that he is doing very well overall, and that he has returned to golfing.     EXAM:  Pleasant adult male in NAD  Respirations even and unlabored.  Left upper extremity:  Distally neurovascularly intact without deficits. Shoulder range of motion is active FE to 155, ER to 30, and IR to back pocket.    IMAGING:  Left shoulder XRs from today were reviewed. Left reverse total shoulder components are in good position and no evidence of lucency. Stable compared to postoperative films.    ASSESSMENT:  1. Nine months status post above procedure    PLAN:  Activities: Resume normal activities as tolerated. Limit weight lifting to below 15 lbs.   Pain medication: NSAIDs and Tylenol PRN  Follow up: Annually or PRN.    By signing my name below, I, Tracy Espinoza, attest that this documentation has been prepared under the direction of Iram La MD.  Electronically Signed: Jyothi Flor.    Provider Disclosure:  I agree with above History, Physical exam and Plan.  I have reviewed the content of the documentation and have edited it as needed. I have personally performed the services documented here and the documentation accurately represents those services and the decisions I have made.      Iram La MD      Again, thank you for allowing me to participate in the care of your patient.        Sincerely,        Iram La MD     [Fatigue] : fatigue [Anxiety] : anxiety [Negative] : Heme/Lymph [Fever] : no fever [Chills] : no chills [Depression] : no depression

## 2023-07-20 ENCOUNTER — OFFICE VISIT (OUTPATIENT)
Dept: FAMILY MEDICINE | Facility: OTHER | Age: 78
End: 2023-07-20
Payer: COMMERCIAL

## 2023-07-20 VITALS
WEIGHT: 209.5 LBS | TEMPERATURE: 97.1 F | DIASTOLIC BLOOD PRESSURE: 74 MMHG | OXYGEN SATURATION: 95 % | HEIGHT: 68 IN | BODY MASS INDEX: 31.75 KG/M2 | RESPIRATION RATE: 17 BRPM | SYSTOLIC BLOOD PRESSURE: 130 MMHG | HEART RATE: 80 BPM

## 2023-07-20 DIAGNOSIS — R26.89 IMBALANCE: ICD-10-CM

## 2023-07-20 DIAGNOSIS — G47.33 OBSTRUCTIVE SLEEP APNEA SYNDROME: ICD-10-CM

## 2023-07-20 DIAGNOSIS — Z00.00 MEDICARE ANNUAL WELLNESS VISIT, SUBSEQUENT: Primary | ICD-10-CM

## 2023-07-20 DIAGNOSIS — I10 ESSENTIAL HYPERTENSION WITH GOAL BLOOD PRESSURE LESS THAN 140/90: ICD-10-CM

## 2023-07-20 DIAGNOSIS — E78.5 HYPERLIPIDEMIA LDL GOAL <100: ICD-10-CM

## 2023-07-20 DIAGNOSIS — E11.9 TYPE 2 DIABETES MELLITUS WITHOUT COMPLICATION, WITHOUT LONG-TERM CURRENT USE OF INSULIN (H): ICD-10-CM

## 2023-07-20 DIAGNOSIS — Z78.9 ALCOHOL USE: ICD-10-CM

## 2023-07-20 DIAGNOSIS — K58.0 IRRITABLE BOWEL SYNDROME WITH DIARRHEA: ICD-10-CM

## 2023-07-20 DIAGNOSIS — C20 MALIGNANT NEOPLASM OF RECTUM (H): ICD-10-CM

## 2023-07-20 LAB
ANION GAP SERPL CALCULATED.3IONS-SCNC: 10 MMOL/L (ref 7–15)
BUN SERPL-MCNC: 20.1 MG/DL (ref 8–23)
CALCIUM SERPL-MCNC: 9.3 MG/DL (ref 8.8–10.2)
CHLORIDE SERPL-SCNC: 103 MMOL/L (ref 98–107)
CHOLEST SERPL-MCNC: 163 MG/DL
CREAT SERPL-MCNC: 0.88 MG/DL (ref 0.67–1.17)
CREAT UR-MCNC: 102.5 MG/DL
DEPRECATED HCO3 PLAS-SCNC: 24 MMOL/L (ref 22–29)
GFR SERPL CREATININE-BSD FRML MDRD: 87 ML/MIN/1.73M2
GLUCOSE SERPL-MCNC: 125 MG/DL (ref 70–99)
HBA1C MFR BLD: 5.6 % (ref 0–5.6)
HDLC SERPL-MCNC: 50 MG/DL
LDLC SERPL CALC-MCNC: 97 MG/DL
MICROALBUMIN UR-MCNC: <12 MG/L
MICROALBUMIN/CREAT UR: NORMAL MG/G{CREAT}
NONHDLC SERPL-MCNC: 113 MG/DL
POTASSIUM SERPL-SCNC: 4.5 MMOL/L (ref 3.4–5.3)
SODIUM SERPL-SCNC: 137 MMOL/L (ref 136–145)
TRIGL SERPL-MCNC: 81 MG/DL

## 2023-07-20 PROCEDURE — 82570 ASSAY OF URINE CREATININE: CPT | Performed by: PHYSICIAN ASSISTANT

## 2023-07-20 PROCEDURE — G0439 PPPS, SUBSEQ VISIT: HCPCS | Performed by: PHYSICIAN ASSISTANT

## 2023-07-20 PROCEDURE — 82043 UR ALBUMIN QUANTITATIVE: CPT | Performed by: PHYSICIAN ASSISTANT

## 2023-07-20 PROCEDURE — 99214 OFFICE O/P EST MOD 30 MIN: CPT | Mod: 25 | Performed by: PHYSICIAN ASSISTANT

## 2023-07-20 PROCEDURE — 99207 PR FOOT EXAM NO CHARGE: CPT | Performed by: PHYSICIAN ASSISTANT

## 2023-07-20 PROCEDURE — 36415 COLL VENOUS BLD VENIPUNCTURE: CPT | Performed by: PHYSICIAN ASSISTANT

## 2023-07-20 PROCEDURE — 83036 HEMOGLOBIN GLYCOSYLATED A1C: CPT | Performed by: PHYSICIAN ASSISTANT

## 2023-07-20 PROCEDURE — 80061 LIPID PANEL: CPT | Performed by: PHYSICIAN ASSISTANT

## 2023-07-20 PROCEDURE — 80048 BASIC METABOLIC PNL TOTAL CA: CPT | Performed by: PHYSICIAN ASSISTANT

## 2023-07-20 RX ORDER — ATORVASTATIN CALCIUM 10 MG/1
10 TABLET, FILM COATED ORAL DAILY
Qty: 90 TABLET | Refills: 3 | Status: SHIPPED | OUTPATIENT
Start: 2023-07-20 | End: 2024-07-25

## 2023-07-20 RX ORDER — LISINOPRIL 10 MG/1
10 TABLET ORAL DAILY
Qty: 90 TABLET | Refills: 3 | Status: SHIPPED | OUTPATIENT
Start: 2023-07-20 | End: 2024-07-25

## 2023-07-20 ASSESSMENT — ENCOUNTER SYMPTOMS
DIZZINESS: 0
SHORTNESS OF BREATH: 0
JOINT SWELLING: 1
COUGH: 0
CONSTIPATION: 0
SORE THROAT: 0
CHILLS: 0
DIARRHEA: 0
WEAKNESS: 0
FEVER: 0
PARESTHESIAS: 1
HEMATURIA: 0
DYSURIA: 0
HEMATOCHEZIA: 0
MYALGIAS: 0
ARTHRALGIAS: 1
FREQUENCY: 0
ABDOMINAL PAIN: 0
PALPITATIONS: 0
HEARTBURN: 0
NERVOUS/ANXIOUS: 0
HEADACHES: 0
EYE PAIN: 0
NAUSEA: 0

## 2023-07-20 ASSESSMENT — PAIN SCALES - GENERAL: PAINLEVEL: NO PAIN (0)

## 2023-07-20 ASSESSMENT — ACTIVITIES OF DAILY LIVING (ADL): CURRENT_FUNCTION: NO ASSISTANCE NEEDED

## 2023-07-20 NOTE — PROGRESS NOTES
"SUBJECTIVE:   Filemon is a 78 year old who presents for Preventive Visit.      7/20/2023     8:06 AM   Additional Questions   Roomed by Katherine   Accompanied by Self         7/20/2023     8:06 AM   Patient Reported Additional Medications   Patient reports taking the following new medications none     Are you in the first 12 months of your Medicare coverage?  No    Healthy Habits:     In general, how would you rate your overall health?  Good    Frequency of exercise:  4-5 days/week    Duration of exercise:  30-45 minutes    Do you usually eat at least 4 servings of fruit and vegetables a day, include whole grains    & fiber and avoid regularly eating high fat or \"junk\" foods?  Yes    Taking medications regularly:  Yes    Medication side effects:  None    Ability to successfully perform activities of daily living:  No assistance needed    Home Safety:  No safety concerns identified    Hearing Impairment:  No hearing concerns    In the past 6 months, have you been bothered by leaking of urine?  No    In general, how would you rate your overall mental or emotional health?  Good    Additional concerns today:  Yes       He has felt more unsure of his balance over the past year, especially when walking on uneven ground, wobbly surfaces or narrow surfaces such as a dock. He will use a walking stick during these times which helps. He denies any dizziness or lightheadedness and has no imbalance when walking on normal surfaces.    He still has a few mixed drinks every night before bed. He takes melatonin to help with sleep.    He feels his diabetes remains well controlled.     He continues to experience chronic diarrhea since his previous radiation for rectal cancer. He uses Imodium multiple days per week which helps. He is also thinking about starting a probiotic.     Have you ever done Advance Care Planning? (For example, a Health Directive, POLST, or a discussion with a medical provider or your loved ones about your wishes): " Yes, advance care planning is on file.       Fall risk  Fallen 2 or more times in the past year?: No  Any fall with injury in the past year?: No    Cognitive Screening   1) Repeat 3 items (Leader, Season, Table)    2) Clock draw: NORMAL  3) 3 item recall: Recalls 2 objects   Results: NORMAL clock, 1-2 items recalled: COGNITIVE IMPAIRMENT LESS LIKELY    Mini-CogTM Copyright BALBIR Carpenter. Licensed by the author for use in [facility]; reprinted with permission (bry@[place]). All rights reserved.      Do you have sleep apnea, excessive snoring or daytime drowsiness?: yes    Reviewed and updated as needed this visit by clinical staff   Tobacco  Allergies  Meds  Problems  Med Hx  Surg Hx  Fam Hx          Reviewed and updated as needed this visit by Provider   Tobacco  Allergies  Meds  Problems  Med Hx  Surg Hx  Fam Hx         Social History     Tobacco Use     Smoking status: Never     Smokeless tobacco: Never     Tobacco comments:     No smokers in home   Substance Use Topics     Alcohol use: Yes     Comment: 3-4 drinks per night           7/20/2023     7:57 AM   Alcohol Use   Prescreen: >3 drinks/day or >7 drinks/week? No     Do you have a current opioid prescription? No  Do you use any other controlled substances or medications that are not prescribed by a provider? None       Current providers sharing in care for this patient include:   Patient Care Team:  Barak Cameron PA-C as PCP - General (Physician Assistant)  Barak Cameron PA-C as Assigned PCP  Laly Poon RN as Diabetes Educator (Diabetes Education)  Iram La MD as Assigned Musculoskeletal Provider    The following health maintenance items are reviewed in Epic and correct as of today:  Health Maintenance   Topic Date Due     LIPID  10/20/2022     MICROALBUMIN  10/20/2022     EYE EXAM  06/01/2023     ANNUAL REVIEW OF HM ORDERS  07/13/2023     MEDICARE ANNUAL WELLNESS VISIT  07/13/2023     INFLUENZA  "VACCINE (1) 09/01/2023     COVID-19 Vaccine (6 - Moderna series) 09/12/2023     BMP  09/22/2023     A1C  10/20/2023     DIABETIC FOOT EXAM  07/20/2024     FALL RISK ASSESSMENT  07/20/2024     ADVANCE CARE PLANNING  07/20/2028     DTAP/TDAP/TD IMMUNIZATION (3 - Td or Tdap) 08/08/2028     HEPATITIS C SCREENING  Completed     PHQ-2 (once per calendar year)  Completed     Pneumococcal Vaccine: 65+ Years  Completed     ZOSTER IMMUNIZATION  Completed     IPV IMMUNIZATION  Aged Out     MENINGITIS IMMUNIZATION  Aged Out     COLORECTAL CANCER SCREENING  Discontinued       Review of Systems   Constitutional: Negative for chills and fever.   HENT: Negative for congestion, ear pain, hearing loss and sore throat.    Eyes: Negative for pain and visual disturbance.   Respiratory: Negative for cough and shortness of breath.    Cardiovascular: Negative for chest pain, palpitations and peripheral edema.   Gastrointestinal: Negative for abdominal pain, constipation, diarrhea, heartburn, hematochezia and nausea.   Genitourinary: Positive for impotence. Negative for dysuria, frequency, genital sores, hematuria, penile discharge and urgency.   Musculoskeletal: Positive for arthralgias and joint swelling. Negative for myalgias.   Skin: Negative for rash.   Neurological: Positive for paresthesias. Negative for dizziness, weakness and headaches.   Psychiatric/Behavioral: Negative for mood changes. The patient is not nervous/anxious.        OBJECTIVE:   /74   Pulse 80   Temp 97.1  F (36.2  C) (Temporal)   Resp 17   Ht 1.715 m (5' 7.5\")   Wt 95 kg (209 lb 8 oz)   SpO2 95%   BMI 32.33 kg/m   Estimated body mass index is 32.33 kg/m  as calculated from the following:    Height as of this encounter: 1.715 m (5' 7.5\").    Weight as of this encounter: 95 kg (209 lb 8 oz).  Physical Exam  GENERAL: healthy, alert and no distress  EYES: Eyes grossly normal to inspection, PERRL and conjunctivae and sclerae normal  HENT: ear canals and TM's " normal, nose and mouth without ulcers or lesions  NECK: no adenopathy, no asymmetry, masses, or scars and thyroid normal to palpation  RESP: lungs clear to auscultation - no rales, rhonchi or wheezes  CV: regular rate and rhythm, normal S1 S2, no S3 or S4, no murmur, click or rub, no peripheral edema and peripheral pulses strong  ABDOMEN: soft, nontender, no hepatosplenomegaly, no masses and bowel sounds normal  MS: no gross musculoskeletal defects noted, no edema. FROM to all extremities. No spinal tenderness.   SKIN: no suspicious lesions or rashes   NEURO: Normal strength and tone, mentation intact and speech normal. Cranial nerves II-XII are grossly intact. DTRs are 2+/4 throughout and symmetric. Gait is stable.  PSYCH: mentation appears normal, affect normal/bright  Diabetic foot exam: normal DP and PT pulses. Dystrophic toenails bilaterally, no ulcerative lesions, normal sensory exam and normal monofilament exam      Lab Results   Component Value Date    A1C 5.6 07/20/2023    A1C 5.8 07/13/2022    A1C 5.4 10/20/2021    A1C 5.5 05/17/2021    A1C 5.6 08/05/2020    A1C 7.1 05/08/2019    A1C 5.8 12/01/2017    A1C 6.1 07/17/2015       ASSESSMENT / PLAN:       ICD-10-CM    1. Medicare annual wellness visit, subsequent  Z00.00       2. Type 2 diabetes mellitus without complication, without long-term current use of insulin (H)  E11.9 Hemoglobin A1c     Albumin Random Urine Quantitative with Creat Ratio     Basic metabolic panel  (Ca, Cl, CO2, Creat, Gluc, K, Na, BUN)     Basic metabolic panel  (Ca, Cl, CO2, Creat, Gluc, K, Na, BUN)     Albumin Random Urine Quantitative with Creat Ratio     Hemoglobin A1c     FOOT EXAM     metFORMIN (GLUCOPHAGE) 500 MG tablet     DISCONTINUED: metFORMIN (GLUCOPHAGE) 500 MG tablet      3. Essential hypertension with goal blood pressure less than 140/90  I10 lisinopril (ZESTRIL) 10 MG tablet      4. Hyperlipidemia LDL goal <100  E78.5 atorvastatin (LIPITOR) 10 MG tablet      5. Irritable  "bowel syndrome with diarrhea  K58.0       6. Obstructive sleep apnea syndrome  G47.33       7. Alcohol use  Z78.9       8. Imbalance  R26.89       9. Malignant neoplasm of rectum (H)  C20           1. Medicare wellness visit completed.    2. His diabetes remains under excellent control and he is losing weight which is great. Continue metformin and plan on recheck next Spring when he is back from [place].    3. Blood pressure remains well controlled on lisinopril.     4. Continue Lipitor.    5. Continue with Imodium as needed and he will start a daily probiotic.    6. Recommend more consistent CPAP use.    7. He has slightly cut back to just 2-3 mixed drinks per night. I ideally would recommend he avoid drinking but he is not interested in quitting. Will continue to monitor.    8. He still has imbalance on uneven and narrow walkways but neuro exam is normal. Continue with walking stick as needed.    9. Distant history and is in remission. Stable colonoscopy last year. Consider discontinuing further follow up given his age.     Follow-up next Spring.          Patient has been advised of split billing requirements and indicates understanding: Yes      COUNSELING:  Reviewed preventive health counseling, as reflected in patient instructions       Regular exercise       Healthy diet/nutrition      BMI:   Estimated body mass index is 32.33 kg/m  as calculated from the following:    Height as of this encounter: 1.715 m (5' 7.5\").    Weight as of this encounter: 95 kg (209 lb 8 oz).   Weight management plan: Discussed healthy diet and exercise guidelines      He reports that he has never smoked. He has never used smokeless tobacco.      Appropriate preventive services were discussed with this patient, including applicable screening as appropriate for cardiovascular disease, diabetes, osteopenia/osteoporosis, and glaucoma.  As appropriate for age/gender, discussed screening for colorectal cancer, prostate cancer, breast " cancer, and cervical cancer. Checklist reviewing preventive services available has been given to the patient.    Reviewed patients plan of care and provided an AVS. The Basic Care Plan (routine screening as documented in Health Maintenance) for Iam meets the Care Plan requirement. This Care Plan has been established and reviewed with the Patient.    CASEY Pisano [facility]    Identified Health Risks:    I have reviewed Opioid Use Disorder and Substance Use Disorder risk factors and made any needed referrals.

## 2023-08-15 ENCOUNTER — HOSPITAL ENCOUNTER (EMERGENCY)
Facility: CLINIC | Age: 78
Discharge: HOME | End: 2023-08-15
Attending: EMERGENCY MEDICINE | Admitting: EMERGENCY MEDICINE
Payer: COMMERCIAL

## 2023-08-15 ENCOUNTER — APPOINTMENT (OUTPATIENT)
Dept: GENERAL RADIOLOGY | Facility: CLINIC | Age: 78
End: 2023-08-15
Attending: EMERGENCY MEDICINE
Payer: COMMERCIAL

## 2023-08-15 VITALS
SYSTOLIC BLOOD PRESSURE: 158 MMHG | TEMPERATURE: 98 F | RESPIRATION RATE: 18 BRPM | OXYGEN SATURATION: 98 % | HEART RATE: 92 BPM | DIASTOLIC BLOOD PRESSURE: 90 MMHG

## 2023-08-15 DIAGNOSIS — M25.511 ACUTE PAIN OF RIGHT SHOULDER: ICD-10-CM

## 2023-08-15 PROCEDURE — 73030 X-RAY EXAM OF SHOULDER: CPT | Mod: RT

## 2023-08-15 PROCEDURE — 99283 EMERGENCY DEPT VISIT LOW MDM: CPT | Performed by: EMERGENCY MEDICINE

## 2023-08-15 NOTE — DISCHARGE INSTRUCTIONS
Use sling as needed for the next 3 to 4 days.  Make sure to take the shoulder out for some gentle range of motion exercises at least twice a day.  May use heat or ice.  May also use Tylenol up to 1000 mg 4 times a day

## 2023-08-15 NOTE — ED PROVIDER NOTES
History     Chief Complaint   Patient presents with    Shoulder Pain     HPI  Iam Kaur is a 78 year old male who presents with right shoulder pain.  He woke up with this this morning.  There is been no trauma directly to it.  Remote history of dislocation during high school wrestling.  He was doing some excessive work at the cabin with his arm this last week his wife reports.    Allergies:  Allergies   Allergen Reactions    Naproxen Itching       Problem List:    Patient Active Problem List    Diagnosis Date Noted    Nontraumatic complete tear of left rotator cuff 08/06/2022     Priority: Medium    Type 2 diabetes mellitus without complication, without long-term current use of insulin (H) 05/20/2019     Priority: Medium    Radiation proctitis 05/08/2019     Priority: Medium    Primary osteoarthritis of both shoulders 05/08/2019     Priority: Medium    Alcohol use 11/30/2016     Priority: Medium    Lumbosacral neuritis 09/28/2013     Priority: Medium     L5/S1 with moderate foraminal stenosis on the left       Facet arthritis of lumbar region 09/28/2013     Priority: Medium    Spondylosis of lumbar joint 04/22/2013     Priority: Medium    Trochanteric bursitis 04/22/2013     Priority: Medium    Pain in shoulder 04/22/2013     Priority: Medium    HTN, goal below 140/90 02/27/2013     Priority: Medium    Advanced directives, counseling/discussion 10/31/2012     Priority: Medium     Discussed advance care planning with patient; information given to patient to review. 10/31/2012         Impaired fasting glucose 05/04/2011     Priority: Medium    Essential hypertension 05/04/2011     Priority: Medium    Obesity 05/04/2011     Priority: Medium    Hyperlipidemia LDL goal <130 05/04/2011     Priority: Medium    Sleep apnea      Priority: Medium     severe, recommended CPAP      Tear of medial cartilage or meniscus of knee, current 09/14/2009     Priority: Medium    Irritable bowel syndrome      Priority: Medium      Regular diarrhea      Malignant neoplasm of rectum (H) 08/07/2001     Priority: Medium        Past Medical History:    Past Medical History:   Diagnosis Date    Chest pain, unspecified 06/22/1999    Closed dislocation of acromioclavicular (joint) 1962 & 1990    Irritable bowel syndrome     Malignant neoplasm of colon, unspecified site 2000    Pneumonia, organism unspecified(486) 1977    Sleep apnea 11/09       Past Surgical History:    Past Surgical History:   Procedure Laterality Date    C EXCIS RECTAL LESION,TRANSANAL  05/09/2000    Transanal excision of rectal polyp, tubulovillous adenoma with severe dysplasia    COLONOSCOPY  03/05/07    repeat in 3 yrs    COLONOSCOPY  06/09/10    repeat in 3 yrs    COLONOSCOPY  6/11/13    colonoscopy, polypectomy, recheck in 3 years    COLONOSCOPY N/A 9/14/2018    Procedure: COMBINED COLONOSCOPY, SINGLE OR MULTIPLE BIOPSY/POLYPECTOMY BY BIOPSY;;  Surgeon: Rosendo Delgado MD;  Location: [facility] OR    COLONOSCOPY N/A 8/24/2022    Procedure: COLONOSCOPY, FLEXIBLE, WITH LESION REMOVAL USING SNARE;  Surgeon: Armando Acharya DO;  Location: [facility] OR    COLONOSCOPY WITH CO2 INSUFFLATION N/A 9/14/2018    Procedure: COLONOSCOPY WITH CO2 INSUFFLATION;  Colonoscopy  Loose stools  BMI 37.17  Pharmacy: [facility] [place] - Fax [phone]  Referred by Dr. Delgado;  Surgeon: Rosendo Delgado MD;  Location: [facility] OR    COLONOSCOPY WITH CO2 INSUFFLATION N/A 8/24/2022    Procedure: COLONOSCOPY, WITH CO2 INSUFFLATION;  Surgeon: Armando Acharya DO;  Location: [facility] OR    ESOPHAGOSCOPY, GASTROSCOPY, DUODENOSCOPY (EGD), COMBINED  11/30/2010    COMBINED ESOPHAGOSCOPY, GASTROSCOPY, DUODENOSCOPY (EGD), BIOPSY SINGLE OR MULTIPLE performed by SHAGUFTA ROLDAN at  GI    HC REMOVAL OF TONSILS,<10 Y/O      Unsure of age at time of surgery    HC REPAIR ROTATOR CUFF,ACUTE  1962, 1966    Bilateral shoulder surgery for chronic dislocation    INJECT EPIDURAL LUMBAR  [facility]/14/2014    [facility]    REMOVAL OF SPERM DUCT(S)  1975    Vasectomy    REVERSE ARTHROPLASTY SHOULDER Left 9/20/2022    Procedure: Left reverse total shoulder arthroplasty;  Surgeon: Iram La MD;  Location:  OR    [facility] COLONOSCOPY THRU STOMA, DIAGNOSTIC  2001       Family History:    Family History   Problem Relation Age of Onset    Alzheimer Disease Father     Cerebrovascular Disease Father         x 3-4 episodes    Arthritis Mother         RA    Breast Cancer Sister     Heart Disease Sister     Heart Disease Sister     Other Cancer Brother     Diabetes Paternal Grandmother     Heart Disease Maternal Grandmother     Heart Disease Maternal Grandfather     Hypertension Brother        Social History:  Marital Status:   [2]  Social History     Tobacco Use    Smoking status: Never    Smokeless tobacco: Never    Tobacco comments:     No smokers in home   Vaping Use    Vaping Use: Never used   Substance Use Topics    Alcohol use: Yes     Comment: 3-4 drinks per night    Drug use: No     Comment: caffeine 3-4 daily        Medications:    alcohol swab prep pads  aspirin (ASA) 81 MG EC tablet  atorvastatin (LIPITOR) 10 MG tablet  blood glucose (NO BRAND SPECIFIED) test strip  blood glucose calibration (NO BRAND SPECIFIED) solution  blood glucose monitoring (NO BRAND SPECIFIED) meter device kit  lisinopril (ZESTRIL) 10 MG tablet  metFORMIN (GLUCOPHAGE) 500 MG tablet  multivitamin w/minerals (THERA-VIT-M) tablet  thin (NO BRAND SPECIFIED) lancets          Review of Systems  All other systems are reviewed and are negative    Physical Exam   BP: (!) 158/90  Pulse: 92  Temp: 98  F (36.7  C)  Resp: 18  SpO2: 98 %      Physical Exam  Vitals reviewed.   Constitutional:       General: He is not in acute distress.     Appearance: He is not diaphoretic.   HENT:      Head: Normocephalic and atraumatic.   Eyes:      General: No scleral icterus.        Right eye: No discharge.         Left eye: No discharge.       Conjunctiva/sclera: Conjunctivae normal.   Pulmonary:      Effort: Pulmonary effort is normal.      Breath sounds: No stridor.   Musculoskeletal:      Cervical back: Normal range of motion.      Comments: Right shoulder reveals significantly decreased range of motion.  No erythema.  No gross deformity.  Distal CMS intact   Skin:     General: Skin is warm and dry.      Findings: No rash.   Neurological:      Mental Status: He is alert.      Comments: Normal speech and mentation   Psychiatric:         Judgment: Judgment normal.         ED Course                 Procedures              Results for orders placed or performed during the hospital encounter of 08/15/23 (from the past 24 hour(s))   XR Shoulder Right 3 Views    Narrative    EXAM: XR SHOULDER RIGHT G/E 3 VIEWS  LOCATION: [facility]  DATE: 8/15/2023    INDICATION: Shoulder pain.  COMPARISON: None.      Impression    IMPRESSION: No acute fracture or dislocation. Fixation screw projecting over the coracoid process. Severe degenerative arthritis glenohumeral joint with marked bony hypertrophy and chronic osseous proliferation inferiorly. Degenerative changes in the   visualized cervical and thoracic spine.       Medications - No data to display    Assessments & Plan (with Medical Decision Making)  78-year-old male who awoke with some shoulder pain.  No acute trauma.  He was concerned about recurrent dislocation.  X-ray reveals no fracture or dislocation.  Significant degenerative changes noted.  History of significant use recently which may have aggravated it.  Have recommended some rest.  Sling as needed.  Range of motion exercises.  Tylenol as needed for pain.  Refer to sports medicine if not improving over the next week to 10 days.     I have reviewed the nursing notes.    I have reviewed the findings, diagnosis, plan and need for follow up with the patient.          New Prescriptions    No medications on file       Final  diagnoses:   Acute pain of right shoulder       8/15/2023   [facility] EMERGENCY DEPT       Ramirez Wooten MD  08/15/23 2423

## 2023-10-28 ENCOUNTER — HEALTH MAINTENANCE LETTER (OUTPATIENT)
Age: 78
End: 2023-10-28

## 2024-02-19 NOTE — PROGRESS NOTES
Assessment & Plan         ICD-10-CM    1. Knee injury, right, initial encounter  S89.91XA XR Knee Right 3 Views     MR Knee Right w/o Contrast   2. Acute pain of right knee  M25.561 XR Knee Right 3 Views     MR Knee Right w/o Contrast   3. Type 2 diabetes mellitus without complication, without long-term current use of insulin (H)  E11.9 Basic metabolic panel  (Ca, Cl, CO2, Creat, Gluc, K, Na, BUN)     Hemoglobin A1c     Albumin Random Urine Quantitative with Creat Ratio     metFORMIN (GLUCOPHAGE) 500 MG tablet     Albumin Random Urine Quantitative with Creat Ratio     Hemoglobin A1c     Basic metabolic panel  (Ca, Cl, CO2, Creat, Gluc, K, Na, BUN)   4. Essential hypertension with goal blood pressure less than 140/90  I10 Basic metabolic panel  (Ca, Cl, CO2, Creat, Gluc, K, Na, BUN)     Basic metabolic panel  (Ca, Cl, CO2, Creat, Gluc, K, Na, BUN)   5. Hyperlipidemia LDL goal <100  E78.5 Lipid panel reflex to direct LDL Fasting     Lipid panel reflex to direct LDL Fasting   6. History of rectal cancer  Z85.048        1-2. Recent right knee injury with slightly improving pain although pain still lingers and feels weak. X-ray completed and reveals medial joint osteoarthritis but I am concerned about a meniscal or ligamentous injury given his continued symptoms and exam findings so will order an MRI for further evaluation. Continue with ibuprofen as needed and I recommend a soft knee sleeve/brace to wear when walking. Will consider physical therapy and/or orthopedic referral when MRI is completed.    3. Diabetes remains well controlled. Continue current dose of metformin and plan on follow up in 6 months.    4. BP is stable. Continue lisinopril.    5. Updated lipid panel ordered. Continue Lipitor.    6. Distant history of rectal cancer. Had been undergoing colonoscopies every 3 years and is due to again but would like to wait until next year which is feel is reasonable. Last colonoscopy was normal.    Barak MALIK  "CASEY [facility] JOSE M Colbert is a 76 year old who presents for the following health issues     HPI     Musculoskeletal problem/pain  Onset/Duration: 1.5 months ago  Description  Location: knee - right  Joint Swelling: no  Redness: no  Pain: YES  Warmth: no  Intensity:  mild, moderate  Progression of Symptoms:  same  Accompanying signs and symptoms:   Fevers: no  Numbness/tingling/weakness: YES-weakness  History  Trauma to the area: YES-fell   Recent illness:  no  Previous similar problem: no  Previous evaluation:  no  Precipitating or alleviating factors:  Aggravating factors include: standing, walking, climbing stairs, overuse and cold or damp weather  Therapies tried and outcome: stretching and Ibuprofen    He feel in his boat 6 weeks ago and then 2 weeks later, he tripped on a stump. He injured his knee during both episodes and pain and right knee/leg weakness have persisted every since.     Review of Systems   Constitutional, cardiovascular, pulmonary, musculoskeletal, neuro, gi and gu systems are negative, except as otherwise noted.      Objective    /72   Pulse 78   Temp 97.3  F (36.3  C) (Temporal)   Resp 18   Ht 1.676 m (5' 6\")   Wt 99 kg (218 lb 3.2 oz)   SpO2 97%   BMI 35.22 kg/m    Body mass index is 35.22 kg/m .  Physical Exam   GENERAL: healthy, alert and no distress  RESP: lungs clear to auscultation - no rales, rhonchi or wheezes  CV: regular rate and rhythm, normal S1 S2, no S3 or S4, no murmur, click or rub, no peripheral edema   MS: no gross musculoskeletal defects noted. Tenderness over the right knee medial joint line and lateral suprapatellar area. Increased pain upon knee flexion and knee extension. Positive lateral Deonte's.   NEURO: Normal strength and tone, mentation intact and speech normal. Gait is mildly antalgic.  PSYCH: mentation appears normal, affect normal/bright    Results for orders placed or performed in visit on 10/20/21   XR " Knee Right 3 Views     Status: None    Narrative    XR KNEE RIGHT 3 VIEWS 10/20/2021 2:52 PM     HISTORY: right medial and lateral knee pain after 2 separate falls 6  weeks ago; Knee injury, right, initial encounter; Acute pain of right  knee      Impression    IMPRESSION: Medial compartment moderate joint space narrowing. Mild  nonspecific joint effusion. No apparent fracture.    BRITTNEY MARTINEZ MD         SYSTEM ID:  [id number]   Results for orders placed or performed in visit on 10/20/21   Hemoglobin A1c     Status: Normal   Result Value Ref Range    Hemoglobin A1C 5.4 0.0 - 5.6 %              96

## 2024-03-16 ENCOUNTER — HEALTH MAINTENANCE LETTER (OUTPATIENT)
Age: 79
End: 2024-03-16

## 2024-05-09 ENCOUNTER — OFFICE VISIT (OUTPATIENT)
Dept: FAMILY MEDICINE | Facility: OTHER | Age: 79
End: 2024-05-09
Payer: COMMERCIAL

## 2024-05-09 VITALS
HEIGHT: 68 IN | WEIGHT: 215.5 LBS | RESPIRATION RATE: 16 BRPM | TEMPERATURE: 98.1 F | HEART RATE: 82 BPM | OXYGEN SATURATION: 95 % | BODY MASS INDEX: 32.66 KG/M2 | DIASTOLIC BLOOD PRESSURE: 82 MMHG | SYSTOLIC BLOOD PRESSURE: 130 MMHG

## 2024-05-09 DIAGNOSIS — K58.0 IRRITABLE BOWEL SYNDROME WITH DIARRHEA: Primary | ICD-10-CM

## 2024-05-09 DIAGNOSIS — E78.5 HYPERLIPIDEMIA LDL GOAL <100: ICD-10-CM

## 2024-05-09 DIAGNOSIS — F10.90 HEAVY ALCOHOL USE: ICD-10-CM

## 2024-05-09 DIAGNOSIS — I10 ESSENTIAL HYPERTENSION WITH GOAL BLOOD PRESSURE LESS THAN 140/90: ICD-10-CM

## 2024-05-09 DIAGNOSIS — E11.9 TYPE 2 DIABETES MELLITUS WITHOUT COMPLICATION, WITHOUT LONG-TERM CURRENT USE OF INSULIN (H): ICD-10-CM

## 2024-05-09 DIAGNOSIS — F34.1 DYSTHYMIA: ICD-10-CM

## 2024-05-09 DIAGNOSIS — R19.5 DARK STOOLS: ICD-10-CM

## 2024-05-09 DIAGNOSIS — R35.1 NOCTURIA: ICD-10-CM

## 2024-05-09 DIAGNOSIS — C20 MALIGNANT NEOPLASM OF RECTUM (H): ICD-10-CM

## 2024-05-09 LAB
ALBUMIN SERPL BCG-MCNC: 4.3 G/DL (ref 3.5–5.2)
ALBUMIN UR-MCNC: NEGATIVE MG/DL
ALP SERPL-CCNC: 100 U/L (ref 40–150)
ALT SERPL W P-5'-P-CCNC: 26 U/L (ref 0–70)
ANION GAP SERPL CALCULATED.3IONS-SCNC: 12 MMOL/L (ref 7–15)
APPEARANCE UR: CLEAR
AST SERPL W P-5'-P-CCNC: 20 U/L (ref 0–45)
BILIRUB SERPL-MCNC: 0.3 MG/DL
BILIRUB UR QL STRIP: NEGATIVE
BUN SERPL-MCNC: 23.8 MG/DL (ref 8–23)
CALCIUM SERPL-MCNC: 9.5 MG/DL (ref 8.8–10.2)
CHLORIDE SERPL-SCNC: 105 MMOL/L (ref 98–107)
CHOLEST SERPL-MCNC: 170 MG/DL
COLOR UR AUTO: YELLOW
CREAT SERPL-MCNC: 0.93 MG/DL (ref 0.67–1.17)
DEPRECATED HCO3 PLAS-SCNC: 22 MMOL/L (ref 22–29)
EGFRCR SERPLBLD CKD-EPI 2021: 83 ML/MIN/1.73M2
FASTING STATUS PATIENT QL REPORTED: YES
FASTING STATUS PATIENT QL REPORTED: YES
GLUCOSE SERPL-MCNC: 116 MG/DL (ref 70–99)
GLUCOSE UR STRIP-MCNC: NEGATIVE MG/DL
HBA1C MFR BLD: 5.6 % (ref 0–5.6)
HDLC SERPL-MCNC: 55 MG/DL
HGB UR QL STRIP: NEGATIVE
KETONES UR STRIP-MCNC: NEGATIVE MG/DL
LDLC SERPL CALC-MCNC: 98 MG/DL
LEUKOCYTE ESTERASE UR QL STRIP: NEGATIVE
NITRATE UR QL: NEGATIVE
NONHDLC SERPL-MCNC: 115 MG/DL
PH UR STRIP: 5.5 [PH] (ref 5–7)
POTASSIUM SERPL-SCNC: 4.7 MMOL/L (ref 3.4–5.3)
PROT SERPL-MCNC: 7.3 G/DL (ref 6.4–8.3)
SODIUM SERPL-SCNC: 139 MMOL/L (ref 135–145)
SP GR UR STRIP: 1.02 (ref 1–1.03)
TRIGL SERPL-MCNC: 87 MG/DL
UROBILINOGEN UR STRIP-ACNC: 0.2 E.U./DL

## 2024-05-09 PROCEDURE — 81003 URINALYSIS AUTO W/O SCOPE: CPT | Performed by: PHYSICIAN ASSISTANT

## 2024-05-09 PROCEDURE — G2211 COMPLEX E/M VISIT ADD ON: HCPCS | Performed by: PHYSICIAN ASSISTANT

## 2024-05-09 PROCEDURE — 80061 LIPID PANEL: CPT | Performed by: PHYSICIAN ASSISTANT

## 2024-05-09 PROCEDURE — 99214 OFFICE O/P EST MOD 30 MIN: CPT | Performed by: PHYSICIAN ASSISTANT

## 2024-05-09 PROCEDURE — 36415 COLL VENOUS BLD VENIPUNCTURE: CPT | Performed by: PHYSICIAN ASSISTANT

## 2024-05-09 PROCEDURE — 80053 COMPREHEN METABOLIC PANEL: CPT | Performed by: PHYSICIAN ASSISTANT

## 2024-05-09 PROCEDURE — 83036 HEMOGLOBIN GLYCOSYLATED A1C: CPT | Performed by: PHYSICIAN ASSISTANT

## 2024-05-09 RX ORDER — RESPIRATORY SYNCYTIAL VIRUS VACCINE 120MCG/0.5
0.5 KIT INTRAMUSCULAR ONCE
Qty: 1 EACH | Refills: 0 | Status: CANCELLED | OUTPATIENT
Start: 2024-05-09 | End: 2024-05-09

## 2024-05-09 NOTE — PROGRESS NOTES
Assessment & Plan       ICD-10-CM    1. Irritable bowel syndrome with diarrhea  K58.0       2. Dark stools  R19.5 Fecal colorectal cancer screen FIT     Fecal colorectal cancer screen FIT      3. Heavy alcohol use  F10.90       4. Type 2 diabetes mellitus without complication, without long-term current use of insulin (H)  E11.9 Hemoglobin A1c     Hemoglobin A1c      5. Nocturia  R35.1 UA Macroscopic with reflex to Microscopic and Culture - Lab Collect     UA Macroscopic with reflex to Microscopic and Culture - Lab Collect      6. Essential hypertension with goal blood pressure less than 140/90  I10 Comprehensive metabolic panel (BMP + Alb, Alk Phos, ALT, AST, Total. Bili, TP)     Comprehensive metabolic panel (BMP + Alb, Alk Phos, ALT, AST, Total. Bili, TP)     CANCELED: Basic metabolic panel  (Ca, Cl, CO2, Creat, Gluc, K, Na, BUN)      7. Hyperlipidemia LDL goal <100  E78.5 Lipid panel reflex to direct LDL Fasting     Lipid panel reflex to direct LDL Fasting      8. Dysthymia  F34.1       9. Malignant neoplasm of rectum (H)  C20           1-3, 9. History of IBS with chronic, frequent stools, mostly formed, but occasional diarrhea. Okay to continue Pepto and Imodium. Cut back on the fiber from 3 gummies to 2 gummies daily as too much can cause loose stools. His black stools are very likely due to the Pepto but will check a FIT test stool sample given his colon cancer history. His last colonoscopy in 2022 was normal. I discussed the importance of cutting back on his alcohol use, no more than 1-2 drinks a few days per week, not 3-4 drinks nightly, as this is likely contributing to his frequent stools and balance issues. It can also be a depressant and make his mood worse.    4. His A1c today is stable at 5.6. He has already cut back on metformin to 1 tablet with dinner so will discontinue this completely given the excellent diabetic control and the fact that this can contribute to frequent stools. Will recheck  "during annual exam in 2 months.    5. UA today is normal. I recommend he stay hydrated but avoid drinking within 2 hours of going to bed.    6. Blood pressure is stable on lisinopril.    7. Updated lipid panel ordered. Continue atorvastatin.    8. He has felt more \"down\" lately but relates a lot of it to his GI symptoms. As this improves, his mood should improve. Will continue to monitor closely.    Follow-up in 2 months.     Mohan Colbert is a 79 year old, presenting for the following health issues:  Bowel movements        5/9/2024    10:18 AM   Additional Questions   Roomed by Gita DING   Accompanied by June-Wife         5/9/2024    10:18 AM   Patient Reported Additional Medications   Patient reports taking the following new medications Aspirin he is taking every other day, Probiotic, Preservision (make those changes on med list please), and Fiber pills     History of Present Illness       Reason for visit:  Runny stools    He eats 0-1 servings of fruits and vegetables daily.He consumes 0 sweetened beverage(s) daily.He exercises with enough effort to increase his heart rate 30 to 60 minutes per day.  He exercises with enough effort to increase his heart rate 3 or less days per week.   He is taking medications regularly.    Runny stools, has to rush to the bathroom and wake up early to make sure he is cleaned out if he is going anywhere. He was seen by Dr. Delgado a few years ago for frequent stools and was given fiber which has certainly helped over the year. He is also using daily Imodium and Pepto which help, but he still has 6-8 stools most days. The stools have been \"black\" for awhile as well but he denies any bright red blood. He denies abdominal pain, nausea or vomiting. The frequent stools are making him feel more \"down\" at times but no thoughts of self harm. He is still drinking at least 4 mixed drinks per night with one shot of liquor per drink.    Diarrhea  Onset/Duration: 4-5 years ago and just got " "worse when out in [place] around November, been bad ever since colon cancer  Description:       Consistency of stool: runny, formed, and black stools he reports they are black colored all the time       Blood in stool: No       Number of loose stools past 24 hours: not loose  Progression of Symptoms: improving and intermittent (still has lots of BM's throughout the day but the diarrheal pills help the loose stool problem so as far as that improving)  Accompanying signs and symptoms:       Fever: No       Nausea/Vomiting: No       Abdominal pain: No       Weight loss: No       Episodes of constipation: No, not really. They just came back to [place] a couple days ago and it was only \"harder to go\" one time so far but he hasn't had any constipation  History   Ill contacts: No  Recent use of antibiotics: No  Recent travels: YES , just came back from [place] (got back last Tuesday)  Recent medication-new or changes(Rx or OTC): going down on Metformin himself (about 2-3 weeks ago) but that was after this problem happened because he was trying to see if that would help  Precipitating or alleviating factors: diarrheal pills  Therapies tried and outcome: PeptoBismol, diarrheal pills, tried decreasing Metformin (brought that down 2-3 weeks ago), trying to eat certain foods/watching what he is eating, does take Fiber pills normally         Review of Systems  Constitutional, HEENT, cardiovascular, pulmonary, psych, gi and gu systems are negative, except as otherwise noted.      Objective    /82   Pulse 82   Temp 98.1  F (36.7  C) (Temporal)   Resp 16   Ht 1.715 m (5' 7.5\")   Wt 97.8 kg (215 lb 8 oz)   SpO2 95%   BMI 33.25 kg/m    Body mass index is 33.25 kg/m .  Physical Exam   GENERAL: alert and no distress  RESP: lungs clear to auscultation - no rales, rhonchi or wheezes  CV: regular rate and rhythm, normal S1 S2, no S3 or S4, no murmur, click or rub, no peripheral edema  ABDOMEN: soft, nontender, no hepatosplenomegaly, no " masses and bowel sounds normal  MS: no gross musculoskeletal defects noted, no edema  NEURO: Normal strength and tone, mentation intact and speech normal. Gait is stable.   PSYCH: mentation appears normal, affect normal/bright    Lab Results   Component Value Date    A1C 5.6 05/09/2024    A1C 5.6 07/20/2023    A1C 5.8 07/13/2022    A1C 5.4 10/20/2021    A1C 5.5 05/17/2021    A1C 5.6 08/05/2020    A1C 7.1 05/08/2019    A1C 5.8 12/01/2017    A1C 6.1 07/17/2015     Results for orders placed or performed in visit on 05/09/24   UA Macroscopic with reflex to Microscopic and Culture - Lab Collect     Status: Normal    Specimen: Urine, NOS   Result Value Ref Range    Color Urine Yellow Colorless, Straw, Light Yellow, Yellow    Appearance Urine Clear Clear    Glucose Urine Negative Negative mg/dL    Bilirubin Urine Negative Negative    Ketones Urine Negative Negative mg/dL    Specific Gravity Urine 1.025 1.003 - 1.035    Blood Urine Negative Negative    pH Urine 5.5 5.0 - 7.0    Protein Albumin Urine Negative Negative mg/dL    Urobilinogen Urine 0.2 0.2, 1.0 E.U./dL    Nitrite Urine Negative Negative    Leukocyte Esterase Urine Negative Negative    Narrative    Microscopic not indicated   Hemoglobin A1c     Status: Normal   Result Value Ref Range    Hemoglobin A1C 5.6 0.0 - 5.6 %               Signed Electronically by: Barak Cameron PA-C

## 2024-05-09 NOTE — PATIENT INSTRUCTIONS
Cut back on the alcohol as this is likely contributing to your diarrhea and balance issues.  Increase your water intake.    Cut back on the fiber.  Okay to continue with Imodium and Pepto.    Will check some labs today.    Stop metformin and will recheck during your annual exam.    Follow-up in 2 months.

## 2024-05-12 PROCEDURE — 82274 ASSAY TEST FOR BLOOD FECAL: CPT | Performed by: PHYSICIAN ASSISTANT

## 2024-05-14 LAB — HEMOCCULT STL QL IA: NEGATIVE

## 2024-07-25 ENCOUNTER — OFFICE VISIT (OUTPATIENT)
Dept: FAMILY MEDICINE | Facility: OTHER | Age: 79
End: 2024-07-25
Payer: COMMERCIAL

## 2024-07-25 VITALS
BODY MASS INDEX: 34.72 KG/M2 | HEIGHT: 66 IN | TEMPERATURE: 97.5 F | SYSTOLIC BLOOD PRESSURE: 130 MMHG | WEIGHT: 216 LBS | OXYGEN SATURATION: 98 % | DIASTOLIC BLOOD PRESSURE: 72 MMHG | RESPIRATION RATE: 20 BRPM | HEART RATE: 72 BPM

## 2024-07-25 DIAGNOSIS — K58.0 IRRITABLE BOWEL SYNDROME WITH DIARRHEA: ICD-10-CM

## 2024-07-25 DIAGNOSIS — Z00.00 MEDICARE ANNUAL WELLNESS VISIT, SUBSEQUENT: Primary | ICD-10-CM

## 2024-07-25 DIAGNOSIS — F10.90 HEAVY ALCOHOL USE: ICD-10-CM

## 2024-07-25 DIAGNOSIS — E11.9 TYPE 2 DIABETES MELLITUS WITHOUT COMPLICATION, WITHOUT LONG-TERM CURRENT USE OF INSULIN (H): ICD-10-CM

## 2024-07-25 DIAGNOSIS — I10 ESSENTIAL HYPERTENSION WITH GOAL BLOOD PRESSURE LESS THAN 140/90: ICD-10-CM

## 2024-07-25 DIAGNOSIS — E78.5 HYPERLIPIDEMIA LDL GOAL <100: ICD-10-CM

## 2024-07-25 LAB
CREAT UR-MCNC: 51.6 MG/DL
MICROALBUMIN UR-MCNC: <12 MG/L
MICROALBUMIN/CREAT UR: NORMAL MG/G{CREAT}

## 2024-07-25 PROCEDURE — 99214 OFFICE O/P EST MOD 30 MIN: CPT | Mod: 25 | Performed by: PHYSICIAN ASSISTANT

## 2024-07-25 PROCEDURE — G0439 PPPS, SUBSEQ VISIT: HCPCS | Performed by: PHYSICIAN ASSISTANT

## 2024-07-25 PROCEDURE — 99207 PR FOOT EXAM NO CHARGE: CPT | Performed by: PHYSICIAN ASSISTANT

## 2024-07-25 PROCEDURE — 82570 ASSAY OF URINE CREATININE: CPT | Performed by: PHYSICIAN ASSISTANT

## 2024-07-25 PROCEDURE — 82043 UR ALBUMIN QUANTITATIVE: CPT | Performed by: PHYSICIAN ASSISTANT

## 2024-07-25 RX ORDER — ATORVASTATIN CALCIUM 10 MG/1
10 TABLET, FILM COATED ORAL DAILY
Qty: 90 TABLET | Refills: 3 | Status: SHIPPED | OUTPATIENT
Start: 2024-07-25

## 2024-07-25 RX ORDER — LISINOPRIL 10 MG/1
10 TABLET ORAL DAILY
Qty: 90 TABLET | Refills: 3 | Status: SHIPPED | OUTPATIENT
Start: 2024-07-25

## 2024-07-25 SDOH — HEALTH STABILITY: PHYSICAL HEALTH: ON AVERAGE, HOW MANY MINUTES DO YOU ENGAGE IN EXERCISE AT THIS LEVEL?: 20 MIN

## 2024-07-25 SDOH — HEALTH STABILITY: PHYSICAL HEALTH: ON AVERAGE, HOW MANY DAYS PER WEEK DO YOU ENGAGE IN MODERATE TO STRENUOUS EXERCISE (LIKE A BRISK WALK)?: 3 DAYS

## 2024-07-25 ASSESSMENT — PAIN SCALES - GENERAL: PAINLEVEL: NO PAIN (0)

## 2024-07-25 ASSESSMENT — SOCIAL DETERMINANTS OF HEALTH (SDOH): HOW OFTEN DO YOU GET TOGETHER WITH FRIENDS OR RELATIVES?: ONCE A WEEK

## 2024-07-25 ASSESSMENT — PATIENT HEALTH QUESTIONNAIRE - PHQ9
SUM OF ALL RESPONSES TO PHQ QUESTIONS 1-9: 0
SUM OF ALL RESPONSES TO PHQ QUESTIONS 1-9: 0
10. IF YOU CHECKED OFF ANY PROBLEMS, HOW DIFFICULT HAVE THESE PROBLEMS MADE IT FOR YOU TO DO YOUR WORK, TAKE CARE OF THINGS AT HOME, OR GET ALONG WITH OTHER PEOPLE: NOT DIFFICULT AT ALL

## 2024-07-25 NOTE — PATIENT INSTRUCTIONS
Patient Education     Continue to cut back on the drinking.    Will refer you to GI for the diarrhea. Continue Imodium.    Follow-up in November.  Preventive Care Advice   This is general advice given by our system to help you stay healthy. However, your care team may have specific advice just for you. Please talk to your care team about your preventive care needs.  Nutrition  Eat 5 or more servings of fruits and vegetables each day.  Try wheat bread, brown rice and whole grain pasta (instead of white bread, rice, and pasta).  Get enough calcium and vitamin D. Check the label on foods and aim for 100% of the RDA (recommended daily allowance).  Lifestyle  Exercise at least 150 minutes each week  (30 minutes a day, 5 days a week).  Do muscle strengthening activities 2 days a week. These help control your weight and prevent disease.  No smoking.  Wear sunscreen to prevent skin cancer.  Have a dental exam and cleaning every 6 months.  Yearly exams  See your health care team every year to talk about:  Any changes in your health.  Any medicines your care team has prescribed.  Preventive care, family planning, and ways to prevent chronic diseases.  Shots (vaccines)   HPV shots (up to age 26), if you've never had them before.  Hepatitis B shots (up to age 59), if you've never had them before.  COVID-19 shot: Get this shot when it's due.  Flu shot: Get a flu shot every year.  Tetanus shot: Get a tetanus shot every 10 years.  Pneumococcal, hepatitis A, and RSV shots: Ask your care team if you need these based on your risk.  Shingles shot (for age 50 and up)  General health tests  Diabetes screening:  Starting at age 35, Get screened for diabetes at least every 3 years.  If you are younger than age 35, ask your care team if you should be screened for diabetes.  Cholesterol test: At age 39, start having a cholesterol test every 5 years, or more often if advised.  Bone density scan (DEXA): At age 50, ask your care team if you  should have this scan for osteoporosis (brittle bones).  Hepatitis C: Get tested at least once in your life.  STIs (sexually transmitted infections)  Before age 24: Ask your care team if you should be screened for STIs.  After age 24: Get screened for STIs if you're at risk. You are at risk for STIs (including HIV) if:  You are sexually active with more than one person.  You don't use condoms every time.  You or a partner was diagnosed with a sexually transmitted infection.  If you are at risk for HIV, ask about PrEP medicine to prevent HIV.  Get tested for HIV at least once in your life, whether you are at risk for HIV or not.  Cancer screening tests  Cervical cancer screening: If you have a cervix, begin getting regular cervical cancer screening tests starting at age 21.  Breast cancer scan (mammogram): If you've ever had breasts, begin having regular mammograms starting at age 40. This is a scan to check for breast cancer.  Colon cancer screening: It is important to start screening for colon cancer at age 45.  Have a colonoscopy test every 10 years (or more often if you're at risk) Or, ask your provider about stool tests like a FIT test every year or Cologuard test every 3 years.  To learn more about your testing options, visit:   .  For help making a decision, visit:   https://bit.ly/se87039.  Prostate cancer screening test: If you have a prostate, ask your care team if a prostate cancer screening test (PSA) at age 55 is right for you.  Lung cancer screening: If you are a current or former smoker ages 50 to 80, ask your care team if ongoing lung cancer screenings are right for you.  For informational purposes only. Not to replace the advice of your health care provider. Copyright   2023 [employer]. All rights reserved. Clinically reviewed by the [facility]. [place] 135095 - REV 01/24.  Learning About Activities of Daily Living  What are activities of daily living?      Activities of daily living (ADLs) are the basic self-care tasks you do every day. These include eating, bathing, dressing, and moving around.  As you age, and if you have health problems, you may find that it's harder to do some of these tasks. If so, your doctor can suggest ideas that may help.  To measure what kind of help you may need, your doctor will ask how well you are able to do ADLs. Let your doctor know if there are any tasks that you are having trouble doing. This is an important first step to getting help. And when you have the help you need, you can stay as independent as possible.  How will a doctor assess your ADLs?  Asking about ADLs is part of a routine health checkup your doctor will likely do as you age. Your health check might be done in a doctor's office, in your home, or at a hospital. The goal is to find out if you are having any problems that could make it hard to care for yourself or that make it unsafe for you to be on your own.  To measure your ADLs, your doctor will ask how hard it is for you to do routine tasks. Your doctor may also want to know if you have changed the way you do a task because of a health problem. Your doctor may watch how you:  Walk back and forth.  Keep your balance while you stand or walk.  Move from sitting to standing or from a bed to a chair.  Button or unbutton a shirt or sweater.  Remove and put on your shoes.  It's common to feel a little worried or anxious if you find you can't do all the things you used to be able to do. Talking with your doctor about ADLs is a way to make sure you're as safe as possible and able to care for yourself as well as you can. You may want to bring a caregiver, friend, or family member to your checkup. They can help you talk to your doctor.  Follow-up care is a key part of your treatment and safety. Be sure to make and go to all appointments, and call your doctor if you are having problems. It's also a good idea to know your test  "results and keep a list of the medicines you take.  Current as of: October 24, 2023               Content Version: 14.0    7247-5438 [employer].   Care instructions adapted under license by your healthcare professional. If you have questions about a medical condition or this instruction, always ask your healthcare professional. [employer] disclaims any warranty or liability for your use of this information.      9 Ways to Cut Back on Drinking  Maybe you've found yourself drinking more alcohol than you'd prefer. If you want to cut back, here are some ideas to try.    Think before you drink.  Do you really want a drink, or is it just a habit? If you're used to having a drink at a certain time, try doing something else then.     Look for substitutes.  Find some no-alcohol drinks that you enjoy, like flavored seltzer water, tea with honey, or tonic with a slice of lime. Or try alcohol-free beer or \"virgin\" cocktails (without the alcohol).     Drink more water.  Use water to quench your thirst. Drink a glass of water before you have any alcohol. Have another glass along with every drink or between drinks.     Shrink your drink.  For example, have a bottle of beer instead of a pint. Use a smaller glass for wine. Choose drinks with lower alcohol content (ABV%). Or use less liquor and more mixer in cocktails.     Slow down.  It's easy to drink quickly and without thinking about it. Pay attention, and make each drink last longer.     Do the math.  Total up how much you spend on alcohol each month. How much is that a year? If you cut back, what could you do with the money you save?     Take a break.  Choose a day or two each week when you won't drink at all. Notice how you feel on those days, physically and emotionally. How did you sleep? Do you feel better? Over time, add more break days.     Count calories.  Would you like to lose some weight? For some people that's a good motivator for cutting " "back. Figure out how many calories are in each drink. How many does that add up to in a day? In a week? In a month?     Practice saying no.  Be ready when someone offers you a drink. Try: \"Thanks, I've had enough.\" Or \"Thanks, but I'm cutting back.\" Or \"No, thanks. I feel better when I drink less.\"   Current as of: November 15, 2023               Content Version: 14.0    7465-7012 [employer]   Care instructions adapted under license by your healthcare professional. If you have questions about a medical condition or this instruction, always ask your healthcare professional. [employer] disclaims any warranty or liability for your use of this information.       "

## 2024-07-25 NOTE — PROGRESS NOTES
"Preventive Care Visit  [facility]  Barak Cameron PA-C, Family Medicine  Jul 25, 2024    Assessment & Plan       ICD-10-CM    1. Medicare annual wellness visit, subsequent  Z00.00       2. Type 2 diabetes mellitus without complication, without long-term current use of insulin (H)  E11.9 Albumin Random Urine Quantitative with Creat Ratio     FOOT EXAM     Albumin Random Urine Quantitative with Creat Ratio      3. Hyperlipidemia LDL goal <100  E78.5 atorvastatin (LIPITOR) 10 MG tablet      4. Essential hypertension with goal blood pressure less than 140/90  I10 lisinopril (ZESTRIL) 10 MG tablet      5. Irritable bowel syndrome with diarrhea  K58.0 Adult GI  Referral - Consult Only      6. Heavy alcohol use  F10.90           1. Medicare wellness visit completed.    2. His A1c in May was 5.6 and he is doing great off the metformin without any blood sugar readings greater than 130. He will continue with a low carb diet and active lifestyle and will plan on recheck in 6 months.    3. Continue Lipitor.    4. BP is stable on lisinopril.    5. Continued IBS symptoms, somewhat improved with Imodium but still an issue. Will refer to GI for further evaluation. Continue with good hydration.    6. He has slightly cut back on his alcohol use but is still drinking 3 mixed drinks nightly. I highly recommend he cut back more and ideally quit but he has not interest in quitting at this time.    Recheck in 6 months.         Patient has been advised of split billing requirements and indicates understanding: Yes        BMI  Estimated body mass index is 34.71 kg/m  as calculated from the following:    Height as of this encounter: 1.68 m (5' 6.14\").    Weight as of this encounter: 98 kg (216 lb).     Counseling  Appropriate preventive services were addressed with this patient via screening, questionnaire, or discussion as appropriate for fall prevention, nutrition, physical activity, Tobacco-use " cessation, weight loss and cognition.  Checklist reviewing preventive services available has been given to the patient.  Reviewed patient's diet, addressing concerns and/or questions.   He is at risk for lack of exercise and has been provided with information to increase physical activity for the benefit of his well-being.   He is at risk for psychosocial distress and has been provided with information to reduce risk.   The patient reports drinking more than 3 alcoholic drinks per day and/or more than 7 drhnks per week. The patient was counseled and given information about possible harmful effects of excessive alcohol intake.Patient reported safety concerns were addressed today.      Mohan Colbert is a 79 year old, presenting for the following:  Physical and Diabetes        7/25/2024     9:57 AM   Additional Questions   Roomed by Brisa CALIXTO       Health Care Directive  Patient has a Health Care Directive on file  Advance care planning document is on file but is outdated.  Patient encouraged to update.    HPI    He is still dealing with frequent diarrhea throughout the day but Imodium does seem to help. He is interested in seeing a GI specialist.     He states he cut back to 3 mixed drinks nightly and sometimes has less.     Diabetes Follow-up    How often are you checking your blood sugar? A few times a month  What time of day are you checking your blood sugars (select all that apply)?  Before meals  Have you had any blood sugars above 200?  No  Have you had any blood sugars below 70?  No  What symptoms do you notice when your blood sugar is low?  None  What concerns do you have today about your diabetes? None   Do you have any of these symptoms? (Select all that apply)  No numbness or tingling in feet.  No redness, sores or blisters on feet.  No complaints of excessive thirst.  No reports of blurry vision.  No significant changes to weight.  Have you had a diabetic eye exam in the last 12 months? No        BP  Readings from Last 2 Encounters:   07/25/24 130/72   05/09/24 130/82     Hemoglobin A1C (%)   Date Value   05/09/2024 5.6   07/20/2023 5.6   05/17/2021 5.5   08/05/2020 5.6     LDL Cholesterol Calculated (mg/dL)   Date Value   05/09/2024 98   07/20/2023 97   08/05/2020 92   05/08/2019 89           7/25/2024   General Health   How would you rate your overall physical health? Good   Feel stress (tense, anxious, or unable to sleep) Only a little      (!) STRESS CONCERN      7/25/2024   Nutrition   Diet: Regular (no restrictions)    I don't know       Multiple values from one day are sorted in reverse-chronological order         7/25/2024   Exercise   Days per week of moderate/strenous exercise 3 days   Average minutes spent exercising at this level 20 min            7/25/2024   Social Factors   Frequency of gathering with friends or relatives Once a week   Worry food won't last until get money to buy more No   Food not last or not have enough money for food? No   Do you have housing? (Housing is defined as stable permanent housing and does not include staying ouside in a car, in a tent, in an abandoned building, in an overnight shelter, or couch-surfing.) Yes   Are you worried about losing your housing? No   Lack of transportation? No   Unable to get utilities (heat,electricity)? No            7/25/2024   Fall Risk   Fallen 2 or more times in the past year? No    No   Trouble with walking or balance? No    Yes       Multiple values from one day are sorted in reverse-chronological order          7/25/2024   Activities of Daily Living- Home Safety   Needs help with the following daily activites None of the above   Safety concerns in the home No grab bars in the bathroom            7/25/2024   Dental   Dentist two times every year? Yes            7/25/2024   Hearing Screening   Hearing concerns? None of the above            7/25/2024   Driving Risk Screening   Patient/family members have concerns about driving No             7/25/2024   General Alertness/Fatigue Screening   Have you been more tired than usual lately? (!) DECLINE            7/25/2024   Urinary Incontinence Screening   Bothered by leaking urine in past 6 months No            7/25/2024   TB Screening   Were you born outside of the [place]? No          Today's PHQ-9 Score:       7/25/2024     9:43 AM   PHQ-9 SCORE   PHQ-9 Total Score MyChart 0   PHQ-9 Total Score 0         7/25/2024   Substance Use   Alcohol more than 3/day or more than 7/wk Yes   How often do you have a drink containing alcohol 2 to 3 times a week   How many alcohol drinks on typical day 3 or 4   How often do you have 5+ drinks at one occasion Less than monthly   Audit 2/3 Score 2   How often not able to stop drinking once started Never   How often failed to do what normally expected Never   How often needed first drink in am after a heavy drinking session Never   How often feeling of guilt or remorse after drinking Never   How often unable to remember what happened the night before Less than monthly   Have you or someone else been injured because of your drinking No   Has anyone been concerned or suggested you cut down on drinking Yes, during the last year   TOTAL SCORE - AUDIT 10   Do you have a current opioid prescription? No   How severe/bad is pain from 1 to 10? 1/10   Do you use any other substances recreationally? No        Social History     Tobacco Use    Smoking status: Never    Smokeless tobacco: Never    Tobacco comments:     No smokers in home   Vaping Use    Vaping status: Never Used   Substance Use Topics    Alcohol use: Yes     Comment: 3-4 drinks per night    Drug use: No     Comment: caffeine 3-4 daily       Reviewed and updated as needed this visit by Provider   Tobacco  Allergies  Meds  Problems  Med Hx  Surg Hx  Fam Hx            Current providers sharing in care for this patient include:  Patient Care Team:  Barak Cameron PA-C as PCP - General (Physician  "Assistant)  Barak Cameron PA-C as Assigned PCP  Laly Poon, RN as Diabetes Educator (Diabetes Education)  Iram La MD as Assigned Musculoskeletal Provider    The following health maintenance items are reviewed in Epic and correct as of today:  Health Maintenance   Topic Date Due    DEPRESSION ACTION PLAN  Never done    RSV VACCINE (Pregnancy & 60+) (1 - 1-dose 60+ series) Never done    EYE EXAM  06/01/2023    COVID-19 Vaccine (6 - 2023-24 season) 09/01/2023    MICROALBUMIN  07/20/2024    A1C  08/09/2024    INFLUENZA VACCINE (1) 09/01/2024    PHQ-9  01/25/2025    BMP  05/09/2025    LIPID  05/09/2025    ANNUAL REVIEW OF HM ORDERS  05/09/2025    MEDICARE ANNUAL WELLNESS VISIT  07/25/2025    DIABETIC FOOT EXAM  07/25/2025    FALL RISK ASSESSMENT  07/25/2025    ADVANCE CARE PLANNING  07/20/2028    DTAP/TDAP/TD IMMUNIZATION (3 - Td or Tdap) 08/08/2028    Pneumococcal Vaccine: 65+ Years  Completed    ZOSTER IMMUNIZATION  Completed    IPV IMMUNIZATION  Aged Out    HPV IMMUNIZATION  Aged Out    MENINGITIS IMMUNIZATION  Aged Out    RSV MONOCLONAL ANTIBODY  Aged Out    COLORECTAL CANCER SCREENING  Discontinued         Review of Systems  Constitutional, HEENT, cardiovascular, pulmonary, GI, , musculoskeletal, neuro, skin, endocrine and psych systems are negative, except as otherwise noted.     Objective    Exam  /72   Pulse 72   Temp 97.5  F (36.4  C) (Temporal)   Resp 20   Ht 1.68 m (5' 6.14\")   Wt 98 kg (216 lb)   SpO2 98%   BMI 34.71 kg/m     Estimated body mass index is 34.71 kg/m  as calculated from the following:    Height as of this encounter: 1.68 m (5' 6.14\").    Weight as of this encounter: 98 kg (216 lb).    Physical Exam  GENERAL: healthy, alert and no distress  EYES: Eyes grossly normal to inspection, PERRL and conjunctivae and sclerae normal  HENT: ear canals and TM's normal, nose and mouth without ulcers or lesions  NECK: no adenopathy, no asymmetry, masses, or scars " and thyroid normal to palpation  RESP: lungs clear to auscultation - no rales, rhonchi or wheezes  CV: regular rate and rhythm, normal S1 S2, no S3 or S4, no murmur, click or rub, no peripheral edema and peripheral pulses strong  ABDOMEN: soft, nontender, no hepatosplenomegaly, no masses and bowel sounds normal  MS: no gross musculoskeletal defects noted, no edema. FROM to all extremities. No spinal tenderness.   SKIN: no suspicious lesions or rashes  NEURO: Normal strength and tone, mentation intact and speech normal. Cranial nerves II-XII are grossly intact. DTRs are 2+/4 throughout and symmetric. Gait is stable.   PSYCH: mentation appears normal, affect normal/bright  Diabetic foot exam: normal DP and PT pulses, yellowed, dystrophic toenails bilaterally, no ulcerative lesions, normal sensory exam, and normal monofilament exam          7/25/2024   Mini Cog   Clock Draw Score 0 Abnormal   3 Item Recall 0 objects recalled   Mini Cog Total Score 0             Signed Electronically by: Barak Cameron PA-C    Answers submitted by the patient for this visit:  Patient Health Questionnaire (Submitted on 7/25/2024)  If you checked off any problems, how difficult have these problems made it for you to do your work, take care of things at home, or get along with other people?: Not difficult at all  PHQ9 TOTAL SCORE: 0

## 2024-10-07 ENCOUNTER — TRANSFERRED RECORDS (OUTPATIENT)
Dept: HEALTH INFORMATION MANAGEMENT | Facility: CLINIC | Age: 79
End: 2024-10-07
Payer: COMMERCIAL

## 2024-10-07 LAB — RETINOPATHY: NEGATIVE

## 2024-10-08 ENCOUNTER — OFFICE VISIT (OUTPATIENT)
Dept: GASTROENTEROLOGY | Facility: CLINIC | Age: 79
End: 2024-10-08
Attending: PHYSICIAN ASSISTANT
Payer: COMMERCIAL

## 2024-10-08 VITALS
WEIGHT: 219.9 LBS | BODY MASS INDEX: 35.34 KG/M2 | DIASTOLIC BLOOD PRESSURE: 91 MMHG | HEART RATE: 83 BPM | HEIGHT: 66 IN | SYSTOLIC BLOOD PRESSURE: 138 MMHG | OXYGEN SATURATION: 98 %

## 2024-10-08 DIAGNOSIS — K58.0 IRRITABLE BOWEL SYNDROME WITH DIARRHEA: ICD-10-CM

## 2024-10-08 PROCEDURE — 99203 OFFICE O/P NEW LOW 30 MIN: CPT | Performed by: PHYSICIAN ASSISTANT

## 2024-10-08 RX ORDER — MONTELUKAST SODIUM 4 MG/1
1 TABLET, CHEWABLE ORAL DAILY
Qty: 90 TABLET | Refills: 0 | Status: SHIPPED | OUTPATIENT
Start: 2024-10-08

## 2024-10-08 RX ORDER — IBUPROFEN 200 MG
200 TABLET ORAL PRN
COMMUNITY

## 2024-10-08 ASSESSMENT — PAIN SCALES - GENERAL: PAINLEVEL: NO PAIN (0)

## 2024-10-08 NOTE — NURSING NOTE
"Chief Complaint   Patient presents with    New Patient     New consult for IBS with diarrhea.  Last seen per Dr. Rosendo Delgado on 08/07/2018     He requests these members of his care team be copied on today's visit information:  PCP:   Barak Cameron PA-C  [address] [place] [address] [address]  [place] [address]    Vitals:    10/08/24 1547   BP: (!) 138/91   BP Location: Left arm   Patient Position: Sitting   Cuff Size: Adult Large   Pulse: 83   SpO2: 98%   Weight: 99.7 kg (219 lb 14.4 oz)   Height: 1.68 m (5' 6.14\")     Body mass index is 35.34 kg/m .    Medications were reconciled.        Elizabeth Sands CMA    "

## 2024-10-08 NOTE — LETTER
10/8/2024      Iam Kaur  [address] [address]  [place] [address]      Dear Colleague,    Thank you for referring your patient, Iam Kaur, to the [facility]. Please see a copy of my visit note below.    NEW PATIENT GI CLINIC VISIT    CC/REFERRING MD:  Barak Cameron  REASON FOR CONSULTATION:   Barak Cameron for   Chief Complaint   Patient presents with     New Patient     New consult for IBS with diarrhea.  Last seen per Dr. Rosendo Delgado on 08/07/2018       ASSESSMENT/PLAN: Patient here for follow-up of longstanding functional diarrhea.  Reviewed previous workup, no specific concerning findings for inflammation or malabsorption.  Regular alcohol use may be contributing.  Otherwise no clear dietary trigger.  He has seen some improvement with supplemental fiber and using Imodium as needed.  At this point, I think regular use of a bile acid resin would be reasonable.  He will start 1 tablet of colestipol daily and we will titrate upwards as needed.  Continue on current fiber supplement as well.  For now, we will defer on any additional testing, given previous normal results.  I will follow-up with patient over the [facility] in a couple of weeks to see if things are going and he was encouraged to reach out to me sooner with any questions or concerns.    1. Irritable bowel syndrome with diarrhea  - Adult GI  Referral - Consult Only  - colestipol (COLESTID) 1 g tablet; Take 1 tablet (1 g) by mouth daily.  Dispense: 90 tablet; Refill: 0        RTC 3-4 months    Thank you for this consultation.  It was a pleasure to participate in the care of this patient; please contact us with any further questions.      17 minutes spent on the date of the encounter doing chart review, patient visit, and documentation    This note was created with voice recognition software, and while reviewed for accuracy, typos may remain.     Margarito Krishnan PA-C  Division of  "Gastroenterology, Hepatology and Nutrition  [facility]      HPI  Iam Kaur is a 79 year old male with a past medical history significant for type 2 diabetes, hypertension, hyperlipidemia, and history of rectal cancer that is seen as a new patient in the GI clinic today for follow-up.  To review, he initially met with my partner, Dr. Brody Delgado, about 6 years ago for chronic diarrhea.  At the time, his workup included assessment of inflammatory markers and celiac serologies, both of which returned normal.  He ultimately had diagnostic colonoscopy to assess for microscopic colitis, radiation proctitis, IBD, which was ultimately normal.  He was asked to use fiber supplement to treat his symptoms.  He had some improvement with this, but still was experiencing multiple loose to watery, nonbloody stools daily.  Elimination of metformin and increasing his fiber supplement have helped a bit, now having semiformed stool but still having upwards of 6-8 BMs in the morning.  He has continued to drink alcohol roughly 3 drinks per day, though cutting down from previous higher amounts did not seem to help much.  No associated abdominal pain or cramping or rectal pain.  Has not seen any blood in the stool.  Colonoscopy was updated in 2022, notable for subcentimeter TA, no other concerning findings.  He continues to use Imodium as needed if he is going to go golfing or leave the house.  This seems to provide temporary relief.    ROS:    10 point ROS neg other than the symptoms noted above in the HPI.    PREVIOUS ENDOSCOPY:  Reviewed, see HPI    PERTINENT RELEVANT IMAGING OR LABS:  Reviewed    ALLERGIES:     Allergies   Allergen Reactions     Aleve [Naproxen]      \"Got goofy\" and itchy     Naproxen Itching       PERTINENT MEDICATIONS:    Current Outpatient Medications:      aspirin (ASA) 81 MG EC tablet, Take 2 tablets (162 mg) by mouth daily (Patient taking differently: Take " 162 mg by mouth daily. Yes, but not daily due to bruising), Disp: 60 tablet, Rfl: 0     Aspirin Effervescent (CLAY-SELTZER ORIGINAL PO), Take by mouth daily., Disp: , Rfl:      atorvastatin (LIPITOR) 10 MG tablet, Take 1 tablet (10 mg) by mouth daily, Disp: 90 tablet, Rfl: 3     colestipol (COLESTID) 1 g tablet, Take 1 tablet (1 g) by mouth daily., Disp: 90 tablet, Rfl: 0     FIBER PO, Take by mouth 2 times daily., Disp: , Rfl:      ibuprofen (ADVIL/MOTRIN) 200 MG tablet, Take 200 mg by mouth as needed for pain., Disp: , Rfl:      lisinopril (ZESTRIL) 10 MG tablet, Take 1 tablet (10 mg) by mouth daily, Disp: 90 tablet, Rfl: 3     Loperamide-Simethicone (IMODIUM ADVANCED PO), Take by mouth daily., Disp: , Rfl:      MELATONIN PO, Take by mouth at bedtime., Disp: , Rfl:      multivitamin w/minerals (THERA-VIT-M) tablet, Take 1 tablet by mouth daily, Disp: , Rfl:      Probiotic Product (PROBIOTIC PO), Take by mouth daily., Disp: , Rfl:      alcohol swab prep pads, Use to swab area of injection/shazia as directed. (Patient not taking: Reported on 10/8/2024), Disp: 100 each, Rfl: 3     blood glucose (NO BRAND SPECIFIED) test strip, Use to test blood sugar 3 times daily or as directed. To accompany: Blood Glucose Monitor Brands: per insurance. (Patient not taking: Reported on 10/8/2024), Disp: 100 strip, Rfl: 6     blood glucose calibration (NO BRAND SPECIFIED) solution, To accompany: Blood Glucose Monitor Brands: per insurance. (Patient not taking: Reported on 10/8/2024), Disp: 1 Bottle, Rfl: 3     blood glucose monitoring (NO BRAND SPECIFIED) meter device kit, Use to test blood sugar 3 times daily or as directed. Blood Glucose Monitor Brands: per insurance. (Patient not taking: Reported on 10/8/2024), Disp: 1 kit, Rfl: 0     thin (NO BRAND SPECIFIED) lancets, Use to check glucose 3 times daily. To accompany: Blood Glucose Monitor Brands: per insurance. (Patient not taking: Reported on 10/8/2024), Disp: 100 each, Rfl:  6    Current Facility-Administered Medications:      3 mL bupivacaine (MARCAINE) preservative free injection 0.5% (20 mL vial), 3 mL, , , Carlos Rodriguez PA-C, 3 mL at 06/04/21 1411     triamcinolone (KENALOG-40) injection 80 mg, 80 mg, , , Carlos Rodriguez PA-C, 80 mg at 06/04/21 1411    PROBLEM LIST  Patient Active Problem List    Diagnosis Date Noted     Nontraumatic complete tear of left rotator cuff 08/06/2022     Priority: Medium     Type 2 diabetes mellitus without complication, without long-term current use of insulin (H) 05/20/2019     Priority: Medium     Radiation proctitis 05/08/2019     Priority: Medium     Primary osteoarthritis of both shoulders 05/08/2019     Priority: Medium     Alcohol use 11/30/2016     Priority: Medium     Lumbosacral neuritis 09/28/2013     Priority: Medium     L5/S1 with moderate foraminal stenosis on the left        Facet arthritis of lumbar region 09/28/2013     Priority: Medium     Spondylosis of lumbar joint 04/22/2013     Priority: Medium     Trochanteric bursitis 04/22/2013     Priority: Medium     Pain in shoulder 04/22/2013     Priority: Medium     HTN, goal below 140/90 02/27/2013     Priority: Medium     Impaired fasting glucose 05/04/2011     Priority: Medium     Essential hypertension 05/04/2011     Priority: Medium     Obesity 05/04/2011     Priority: Medium     Hyperlipidemia LDL goal <130 05/04/2011     Priority: Medium     Sleep apnea      Priority: Medium     severe, recommended CPAP       Tear of medial cartilage or meniscus of knee, current 09/14/2009     Priority: Medium     Irritable bowel syndrome      Priority: Medium     Regular diarrhea       Malignant neoplasm of rectum (H) 08/07/2001     Priority: Medium       PERTINENT PAST MEDICAL HISTORY:  Past Medical History:   Diagnosis Date     Chest pain, unspecified 06/22/1999    Atypical chest pain, felt to be stress related, negative GXT     Closed dislocation of acromioclavicular (joint) 1962 & 1990     Chronic dislocations of shoulder - two surgeries with good results     Irritable bowel syndrome      Malignant neoplasm of colon, unspecified site 2000    Colon cancer/ rectal, Dr. Tee     Pneumonia, organism unspecified(486) 1977    Hospitalized 3 to 4 days     Sleep apnea 11/09    severe, recommended CPAP       PREVIOUS SURGERIES:  Past Surgical History:   Procedure Laterality Date     C EXCIS RECTAL LESION,TRANSANAL  05/09/2000    Transanal excision of rectal polyp, tubulovillous adenoma with severe dysplasia     COLONOSCOPY  03/05/07    repeat in 3 yrs     COLONOSCOPY  06/09/10    repeat in 3 yrs     COLONOSCOPY  6/11/13    colonoscopy, polypectomy, recheck in 3 years     COLONOSCOPY N/A 9/14/2018    Procedure: COMBINED COLONOSCOPY, SINGLE OR MULTIPLE BIOPSY/POLYPECTOMY BY BIOPSY;;  Surgeon: Rosendo Delgado MD;  Location: [facility] OR     COLONOSCOPY N/A 8/24/2022    Procedure: COLONOSCOPY, FLEXIBLE, WITH LESION REMOVAL USING SNARE;  Surgeon: Armando Acharya DO;  Location: [facility] OR     COLONOSCOPY WITH CO2 INSUFFLATION N/A 9/14/2018    Procedure: COLONOSCOPY WITH CO2 INSUFFLATION;  Colonoscopy  Loose stools  BMI 37.17  Pharmacy: [facility] [place] - Fax [phone]  Referred by Dr. Delgado;  Surgeon: Rosendo Delgado MD;  Location: [facility] OR     COLONOSCOPY WITH CO2 INSUFFLATION N/A 8/24/2022    Procedure: COLONOSCOPY, WITH CO2 INSUFFLATION;  Surgeon: Armando Acharya DO;  Location: [facility] OR     ESOPHAGOSCOPY, GASTROSCOPY, DUODENOSCOPY (EGD), COMBINED  11/30/2010    COMBINED ESOPHAGOSCOPY, GASTROSCOPY, DUODENOSCOPY (EGD), BIOPSY SINGLE OR MULTIPLE performed by SHAGUFTA ROLDAN at  GI     HC REMOVAL OF TONSILS,<10 Y/O      Unsure of age at time of surgery     HC REPAIR ROTATOR CUFF,ACUTE  1962, 1966    Bilateral shoulder surgery for chronic dislocation     INJECT EPIDURAL LUMBAR  10/14/2014    [facility]     REMOVAL OF SPERM DUCT(S)  1975    Vasectomy     REVERSE ARTHROPLASTY  SHOULDER Left 9/20/2022    Procedure: Left reverse total shoulder arthroplasty;  Surgeon: Iram La MD;  Location:  OR     [facility] COLONOSCOPY THRU STOMA, DIAGNOSTIC  2001       SOCIAL HISTORY:  Social History     Socioeconomic History     Marital status:      Spouse name: June     Number of children: 2     Years of education: 16     Highest education level: Not on file   Occupational History     Occupation:      Comment: [employer]   Tobacco Use     Smoking status: Never     Smokeless tobacco: Never     Tobacco comments:     No smokers in home   Vaping Use     Vaping status: Never Used   Substance and Sexual Activity     Alcohol use: Yes     Comment: 3-4 drinks per night     Drug use: No     Comment: caffeine 3-4 daily     Sexual activity: Yes     Partners: Female     Birth control/protection: Surgical     Comment: vasectomy   Other Topics Concern      Service No     Blood Transfusions No     Caffeine Concern No     Comment: 6 cups/day     Occupational Exposure No     Hobby Hazards No     Comment: golfs, fishes, hunts     Sleep Concern Yes     Comment: doesn't sleep under stress well     Stress Concern Yes     Comment: work     Weight Concern Yes     Special Diet No     Back Care No     Exercise Yes     Comment: 3/week     Bike Helmet No     Seat Belt Yes     Self-Exams No     Parent/sibling w/ CABG, MI or angioplasty before 65F 55M? Not Asked   Social History Narrative     Not on file     Social Determinants of Health     Financial Resource Strain: Low Risk  (7/25/2024)    Financial Resource Strain      Within the past 12 months, have you or your family members you live with been unable to get utilities (heat, electricity) when it was really needed?: No   Food Insecurity: Low Risk  (7/25/2024)    Food Insecurity      Within the past 12 months, did you worry that your food would run out before you got money to buy more?: No      Within the past 12 months, did  the food you bought just not last and you didn t have money to get more?: No   Transportation Needs: Low Risk  (7/25/2024)    Transportation Needs      Within the past 12 months, has lack of transportation kept you from medical appointments, getting your medicines, non-medical meetings or appointments, work, or from getting things that you need?: No   Physical Activity: Insufficiently Active (7/25/2024)    Exercise Vital Sign      Days of Exercise per Week: 3 days      Minutes of Exercise per Session: 20 min   Stress: No Stress Concern Present (7/25/2024)    [employer] - Occupational Stress Questionnaire      Feeling of Stress : Only a little   Social Connections: Unknown (7/25/2024)    Social Connection and Isolation Panel [NHANES]      Frequency of Communication with Friends and Family: Not on file      Frequency of Social Gatherings with Friends and Family: Once a week      Attends Holiness Services: Not on file      Active Member of Clubs or Organizations: Not on file      Attends Club or Organization Meetings: Not on file      Marital Status: Not on file   Interpersonal Safety: Low Risk  (7/25/2024)    Interpersonal Safety      Do you feel physically and emotionally safe where you currently live?: Yes      Within the past 12 months, have you been hit, slapped, kicked or otherwise physically hurt by someone?: No      Within the past 12 months, have you been humiliated or emotionally abused in other ways by your partner or ex-partner?: No   Housing Stability: Low Risk  (7/25/2024)    Housing Stability      Do you have housing? : Yes      Are you worried about losing your housing?: No       FAMILY HISTORY:  Family History   Problem Relation Age of Onset     Alzheimer Disease Father      Cerebrovascular Disease Father         x 3-4 episodes     Arthritis Mother         RA     Breast Cancer Sister      Heart Disease Sister      Heart Disease Sister      Other Cancer Brother       "Diabetes Paternal Grandmother      Heart Disease Maternal Grandmother      Heart Disease Maternal Grandfather      Hypertension Brother        Past/family/social history reviewed and no changes    PHYSICAL EXAMINATION:  Constitutional: aaox3, cooperative, pleasant, not dyspneic/diaphoretic, no acute distress  Vitals reviewed: BP (!) 138/91 (BP Location: Left arm, Patient Position: Sitting, Cuff Size: Adult Large)   Pulse 83   Ht 1.68 m (5' 6.14\")   Wt 99.7 kg (219 lb 14.4 oz)   SpO2 98%   BMI 35.34 kg/m    Wt:   Wt Readings from Last 2 Encounters:   10/08/24 99.7 kg (219 lb 14.4 oz)   07/25/24 98 kg (216 lb)      Eyes: Sclera anicteric/injected  CV: No edema  Respiratory: Unlabored breathing  Skin: warm, perfused, no jaundice  Psych: Normal affect  MSK: Normal gait                      Again, thank you for allowing me to participate in the care of your patient.        Sincerely,        Margarito Krishnan PA-C  "

## 2024-10-08 NOTE — PROGRESS NOTES
NEW PATIENT GI CLINIC VISIT    CC/REFERRING MD:  Barak Cameron  REASON FOR CONSULTATION:   Barak Cameron for   Chief Complaint   Patient presents with    New Patient     New consult for IBS with diarrhea.  Last seen per Dr. Rosendo Delgado on 08/07/2018       ASSESSMENT/PLAN: Patient here for follow-up of longstanding functional diarrhea.  Reviewed previous workup, no specific concerning findings for inflammation or malabsorption.  Regular alcohol use may be contributing.  Otherwise no clear dietary trigger.  He has seen some improvement with supplemental fiber and using Imodium as needed.  At this point, I think regular use of a bile acid resin would be reasonable.  He will start 1 tablet of colestipol daily and we will titrate upwards as needed.  Continue on current fiber supplement as well.  For now, we will defer on any additional testing, given previous normal results.  I will follow-up with patient over the MyChart in a couple of weeks to see if things are going and he was encouraged to reach out to me sooner with any questions or concerns.    1. Irritable bowel syndrome with diarrhea  - Adult GI  Referral - Consult Only  - colestipol (COLESTID) 1 g tablet; Take 1 tablet (1 g) by mouth daily.  Dispense: 90 tablet; Refill: 0        RTC 3-4 months    Thank you for this consultation.  It was a pleasure to participate in the care of this patient; please contact us with any further questions.      17 minutes spent on the date of the encounter doing chart review, patient visit, and documentation    This note was created with voice recognition software, and while reviewed for accuracy, typos may remain.     Margarito Krishnan PA-C  Division of Gastroenterology, Hepatology and Nutrition  [facility]  Iam Kaur is a 79 year old male with a past medical history significant for type 2 diabetes, hypertension, hyperlipidemia, and history of  "rectal cancer that is seen as a new patient in the GI clinic today for follow-up.  To review, he initially met with my partner, Dr. Brody Delgado, about 6 years ago for chronic diarrhea.  At the time, his workup included assessment of inflammatory markers and celiac serologies, both of which returned normal.  He ultimately had diagnostic colonoscopy to assess for microscopic colitis, radiation proctitis, IBD, which was ultimately normal.  He was asked to use fiber supplement to treat his symptoms.  He had some improvement with this, but still was experiencing multiple loose to watery, nonbloody stools daily.  Elimination of metformin and increasing his fiber supplement have helped a bit, now having semiformed stool but still having upwards of 6-8 BMs in the morning.  He has continued to drink alcohol roughly 3 drinks per day, though cutting down from previous higher amounts did not seem to help much.  No associated abdominal pain or cramping or rectal pain.  Has not seen any blood in the stool.  Colonoscopy was updated in 2022, notable for subcentimeter TA, no other concerning findings.  He continues to use Imodium as needed if he is going to go golfing or leave the house.  This seems to provide temporary relief.    ROS:    10 point ROS neg other than the symptoms noted above in the HPI.    PREVIOUS ENDOSCOPY:  Reviewed, see HPI    PERTINENT RELEVANT IMAGING OR LABS:  Reviewed    ALLERGIES:     Allergies   Allergen Reactions    Aleve [Naproxen]      \"Got goofy\" and itchy    Naproxen Itching       PERTINENT MEDICATIONS:    Current Outpatient Medications:     aspirin (ASA) 81 MG EC tablet, Take 2 tablets (162 mg) by mouth daily (Patient taking differently: Take 162 mg by mouth daily. Yes, but not daily due to bruising), Disp: 60 tablet, Rfl: 0    Aspirin Effervescent (CLAY-SELTZER ORIGINAL PO), Take by mouth daily., Disp: , Rfl:     atorvastatin (LIPITOR) 10 MG tablet, Take 1 tablet (10 mg) by mouth daily, Disp: 90 " tablet, Rfl: 3    colestipol (COLESTID) 1 g tablet, Take 1 tablet (1 g) by mouth daily., Disp: 90 tablet, Rfl: 0    FIBER PO, Take by mouth 2 times daily., Disp: , Rfl:     ibuprofen (ADVIL/MOTRIN) 200 MG tablet, Take 200 mg by mouth as needed for pain., Disp: , Rfl:     lisinopril (ZESTRIL) 10 MG tablet, Take 1 tablet (10 mg) by mouth daily, Disp: 90 tablet, Rfl: 3    Loperamide-Simethicone (IMODIUM ADVANCED PO), Take by mouth daily., Disp: , Rfl:     MELATONIN PO, Take by mouth at bedtime., Disp: , Rfl:     multivitamin w/minerals (THERA-VIT-M) tablet, Take 1 tablet by mouth daily, Disp: , Rfl:     Probiotic Product (PROBIOTIC PO), Take by mouth daily., Disp: , Rfl:     alcohol swab prep pads, Use to swab area of injection/shazia as directed. (Patient not taking: Reported on 10/8/2024), Disp: 100 each, Rfl: 3    blood glucose (NO BRAND SPECIFIED) test strip, Use to test blood sugar 3 times daily or as directed. To accompany: Blood Glucose Monitor Brands: per insurance. (Patient not taking: Reported on 10/8/2024), Disp: 100 strip, Rfl: 6    blood glucose calibration (NO BRAND SPECIFIED) solution, To accompany: Blood Glucose Monitor Brands: per insurance. (Patient not taking: Reported on 10/8/2024), Disp: 1 Bottle, Rfl: 3    blood glucose monitoring (NO BRAND SPECIFIED) meter device kit, Use to test blood sugar 3 times daily or as directed. Blood Glucose Monitor Brands: per insurance. (Patient not taking: Reported on 10/8/2024), Disp: 1 kit, Rfl: 0    thin (NO BRAND SPECIFIED) lancets, Use to check glucose 3 times daily. To accompany: Blood Glucose Monitor Brands: per insurance. (Patient not taking: Reported on 10/8/2024), Disp: 100 each, Rfl: 6    Current Facility-Administered Medications:     3 mL bupivacaine (MARCAINE) preservative free injection 0.5% (20 mL vial), 3 mL, , , Carlos Rodriguez PA-C, 3 mL at 06/04/21 1411    triamcinolone (KENALOG-40) injection 80 mg, 80 mg, , , Carlos Rodriguez PA-C, 80 mg at 06/04/21  1411    PROBLEM LIST  Patient Active Problem List    Diagnosis Date Noted    Nontraumatic complete tear of left rotator cuff 08/06/2022     Priority: Medium    Type 2 diabetes mellitus without complication, without long-term current use of insulin (H) 05/20/2019     Priority: Medium    Radiation proctitis 05/08/2019     Priority: Medium    Primary osteoarthritis of both shoulders 05/08/2019     Priority: Medium    Alcohol use 11/30/2016     Priority: Medium    Lumbosacral neuritis 09/28/2013     Priority: Medium     L5/S1 with moderate foraminal stenosis on the left       Facet arthritis of lumbar region 09/28/2013     Priority: Medium    Spondylosis of lumbar joint 04/22/2013     Priority: Medium    Trochanteric bursitis 04/22/2013     Priority: Medium    Pain in shoulder 04/22/2013     Priority: Medium    HTN, goal below 140/90 02/27/2013     Priority: Medium    Impaired fasting glucose 05/04/2011     Priority: Medium    Essential hypertension 05/04/2011     Priority: Medium    Obesity 05/04/2011     Priority: Medium    Hyperlipidemia LDL goal <130 05/04/2011     Priority: Medium    Sleep apnea      Priority: Medium     severe, recommended CPAP      Tear of medial cartilage or meniscus of knee, current 09/14/2009     Priority: Medium    Irritable bowel syndrome      Priority: Medium     Regular diarrhea      Malignant neoplasm of rectum (H) 08/07/2001     Priority: Medium       PERTINENT PAST MEDICAL HISTORY:  Past Medical History:   Diagnosis Date    Chest pain, unspecified 06/22/1999    Atypical chest pain, felt to be stress related, negative GXT    Closed dislocation of acromioclavicular (joint) 1962 & 1990    Chronic dislocations of shoulder - two surgeries with good results    Irritable bowel syndrome     Malignant neoplasm of colon, unspecified site 2000    Colon cancer/ rectal, Dr. Tee    Pneumonia, organism unspecified(486) 1977    Hospitalized 3 to 4 days    Sleep apnea 11/09    severe, recommended  CPAP       PREVIOUS SURGERIES:  Past Surgical History:   Procedure Laterality Date    C EXCIS RECTAL LESION,TRANSANAL  05/09/2000    Transanal excision of rectal polyp, tubulovillous adenoma with severe dysplasia    COLONOSCOPY  03/05/07    repeat in 3 yrs    COLONOSCOPY  06/09/10    repeat in 3 yrs    COLONOSCOPY  6/11/13    colonoscopy, polypectomy, recheck in 3 years    COLONOSCOPY N/A 9/14/2018    Procedure: COMBINED COLONOSCOPY, SINGLE OR MULTIPLE BIOPSY/POLYPECTOMY BY BIOPSY;;  Surgeon: Rosendo Delgado MD;  Location: [facility] OR    COLONOSCOPY N/A 8/24/2022    Procedure: COLONOSCOPY, FLEXIBLE, WITH LESION REMOVAL USING SNARE;  Surgeon: Armando Acharya DO;  Location: [facility] OR    COLONOSCOPY WITH CO2 INSUFFLATION N/A 9/14/2018    Procedure: COLONOSCOPY WITH CO2 INSUFFLATION;  Colonoscopy  Loose stools  BMI 37.17  Pharmacy: [facility] - Fax [phone]  Referred by Dr. Delgado;  Surgeon: Rosendo Delgado MD;  Location: [facility] OR    COLONOSCOPY WITH CO2 INSUFFLATION N/A 8/24/2022    Procedure: COLONOSCOPY, WITH CO2 INSUFFLATION;  Surgeon: Armando Acharya DO;  Location: [facility] OR    ESOPHAGOSCOPY, GASTROSCOPY, DUODENOSCOPY (EGD), COMBINED  11/30/2010    COMBINED ESOPHAGOSCOPY, GASTROSCOPY, DUODENOSCOPY (EGD), BIOPSY SINGLE OR MULTIPLE performed by SHAGUFTA ROLDAN at  GI    HC REMOVAL OF TONSILS,<10 Y/O      Unsure of age at time of surgery    HC REPAIR ROTATOR CUFF,ACUTE  1962, 1966    Bilateral shoulder surgery for chronic dislocation    INJECT EPIDURAL LUMBAR  10/14/2014    [facility]    REMOVAL OF SPERM DUCT(S)  1975    Vasectomy    REVERSE ARTHROPLASTY SHOULDER Left 9/20/2022    Procedure: Left reverse total shoulder arthroplasty;  Surgeon: Iram La MD;  Location:  OR    [facility] COLONOSCOPY THRU STOMA, DIAGNOSTIC  2001       SOCIAL HISTORY:  Social History     Socioeconomic History    Marital status:      Spouse name: June    Number of children: 2     Years of education: 16    Highest education level: Not on file   Occupational History    Occupation:      Comment: [employer]   Tobacco Use    Smoking status: Never    Smokeless tobacco: Never    Tobacco comments:     No smokers in home   Vaping Use    Vaping status: Never Used   Substance and Sexual Activity    Alcohol use: Yes     Comment: 3-4 drinks per night    Drug use: No     Comment: caffeine 3-4 daily    Sexual activity: Yes     Partners: Female     Birth control/protection: Surgical     Comment: vasectomy   Other Topics Concern     Service No    Blood Transfusions No    Caffeine Concern No     Comment: 6 cups/day    Occupational Exposure No    Hobby Hazards No     Comment: golfs, fishes, hunts    Sleep Concern Yes     Comment: doesn't sleep under stress well    Stress Concern Yes     Comment: work    Weight Concern Yes    Special Diet No    Back Care No    Exercise Yes     Comment: 3/week    Bike Helmet No    Seat Belt Yes    Self-Exams No    Parent/sibling w/ CABG, MI or angioplasty before 65F 55M? Not Asked   Social History Narrative    Not on file     Social Determinants of Health     Financial Resource Strain: Low Risk  (7/25/2024)    Financial Resource Strain     Within the past 12 months, have you or your family members you live with been unable to get utilities (heat, electricity) when it was really needed?: No   Food Insecurity: Low Risk  (7/25/2024)    Food Insecurity     Within the past 12 months, did you worry that your food would run out before you got money to buy more?: No     Within the past 12 months, did the food you bought just not last and you didn t have money to get more?: No   Transportation Needs: Low Risk  (7/25/2024)    Transportation Needs     Within the past 12 months, has lack of transportation kept you from medical appointments, getting your medicines, non-medical meetings or appointments, work, or from getting things that you need?: No    Physical Activity: Insufficiently Active (7/25/2024)    Exercise Vital Sign     Days of Exercise per Week: 3 days     Minutes of Exercise per Session: 20 min   Stress: No Stress Concern Present (7/25/2024)    [place] [employer] - Occupational Stress Questionnaire     Feeling of Stress : Only a little   Social Connections: Unknown (7/25/2024)    Social Connection and Isolation Panel [NHANES]     Frequency of Communication with Friends and Family: Not on file     Frequency of Social Gatherings with Friends and Family: Once a week     Attends L[employer]-day Services: Not on file     Active Member of Clubs or Organizations: Not on file     Attends Club or Organization Meetings: Not on file     Marital Status: Not on file   Interpersonal Safety: Low Risk  (7/25/2024)    Interpersonal Safety     Do you feel physically and emotionally safe where you currently live?: Yes     Within the past 12 months, have you been hit, slapped, kicked or otherwise physically hurt by someone?: No     Within the past 12 months, have you been humiliated or emotionally abused in other ways by your partner or ex-partner?: No   Housing Stability: Low Risk  (7/25/2024)    Housing Stability     Do you have housing? : Yes     Are you worried about losing your housing?: No       FAMILY HISTORY:  Family History   Problem Relation Age of Onset    Alzheimer Disease Father     Cerebrovascular Disease Father         x 3-4 episodes    Arthritis Mother         RA    Breast Cancer Sister     Heart Disease Sister     Heart Disease Sister     Other Cancer Brother     Diabetes Paternal Grandmother     Heart Disease Maternal Grandmother     Heart Disease Maternal Grandfather     Hypertension Brother        Past/family/social history reviewed and no changes    PHYSICAL EXAMINATION:  Constitutional: aaox3, cooperative, pleasant, not dyspneic/diaphoretic, no acute distress  Vitals reviewed: BP (!) 138/91 (BP Location: Left arm, Patient Position:  "Sitting, Cuff Size: Adult Large)   Pulse 83   Ht 1.68 m (5' 6.14\")   Wt 99.7 kg (219 lb 14.4 oz)   SpO2 98%   BMI 35.34 kg/m    Wt:   Wt Readings from Last 2 Encounters:   10/08/24 99.7 kg (219 lb 14.4 oz)   07/25/24 98 kg (216 lb)      Eyes: Sclera anicteric/injected  CV: No edema  Respiratory: Unlabored breathing  Skin: warm, perfused, no jaundice  Psych: Normal affect  MSK: Normal gait                    "

## 2024-10-12 ENCOUNTER — HEALTH MAINTENANCE LETTER (OUTPATIENT)
Age: 79
End: 2024-10-12

## 2024-11-20 ENCOUNTER — OFFICE VISIT (OUTPATIENT)
Dept: FAMILY MEDICINE | Facility: OTHER | Age: 79
End: 2024-11-20
Payer: COMMERCIAL

## 2024-11-20 ENCOUNTER — MYC MEDICAL ADVICE (OUTPATIENT)
Dept: FAMILY MEDICINE | Facility: OTHER | Age: 79
End: 2024-11-20

## 2024-11-20 VITALS
DIASTOLIC BLOOD PRESSURE: 78 MMHG | HEART RATE: 91 BPM | BODY MASS INDEX: 34.87 KG/M2 | SYSTOLIC BLOOD PRESSURE: 128 MMHG | OXYGEN SATURATION: 97 % | TEMPERATURE: 97.4 F | WEIGHT: 217 LBS | HEIGHT: 66 IN | RESPIRATION RATE: 20 BRPM

## 2024-11-20 DIAGNOSIS — C20 MALIGNANT NEOPLASM OF RECTUM (H): ICD-10-CM

## 2024-11-20 DIAGNOSIS — K58.0 IRRITABLE BOWEL SYNDROME WITH DIARRHEA: ICD-10-CM

## 2024-11-20 DIAGNOSIS — I10 ESSENTIAL HYPERTENSION WITH GOAL BLOOD PRESSURE LESS THAN 140/90: ICD-10-CM

## 2024-11-20 DIAGNOSIS — E11.9 TYPE 2 DIABETES MELLITUS WITHOUT COMPLICATION, WITHOUT LONG-TERM CURRENT USE OF INSULIN (H): Primary | ICD-10-CM

## 2024-11-20 DIAGNOSIS — M15.0 PRIMARY OSTEOARTHRITIS INVOLVING MULTIPLE JOINTS: ICD-10-CM

## 2024-11-20 LAB
EST. AVERAGE GLUCOSE BLD GHB EST-MCNC: 120 MG/DL
HBA1C MFR BLD: 5.8 % (ref 0–5.6)

## 2024-11-20 PROCEDURE — 36415 COLL VENOUS BLD VENIPUNCTURE: CPT | Performed by: PHYSICIAN ASSISTANT

## 2024-11-20 PROCEDURE — 83036 HEMOGLOBIN GLYCOSYLATED A1C: CPT | Performed by: PHYSICIAN ASSISTANT

## 2024-11-20 ASSESSMENT — PAIN SCALES - GENERAL: PAINLEVEL_OUTOF10: MILD PAIN (2)

## 2024-11-20 ASSESSMENT — PATIENT HEALTH QUESTIONNAIRE - PHQ9
10. IF YOU CHECKED OFF ANY PROBLEMS, HOW DIFFICULT HAVE THESE PROBLEMS MADE IT FOR YOU TO DO YOUR WORK, TAKE CARE OF THINGS AT HOME, OR GET ALONG WITH OTHER PEOPLE: NOT DIFFICULT AT ALL
SUM OF ALL RESPONSES TO PHQ QUESTIONS 1-9: 0
SUM OF ALL RESPONSES TO PHQ QUESTIONS 1-9: 0

## 2024-11-20 NOTE — PROGRESS NOTES
Assessment & Plan       ICD-10-CM    1. Type 2 diabetes mellitus without complication, without long-term current use of insulin (H)  E11.9 HEMOGLOBIN A1C     HEMOGLOBIN A1C      2. Primary osteoarthritis involving multiple joints  M15.0       3. Essential hypertension with goal blood pressure less than 140/90  I10       4. Malignant neoplasm of rectum (H)  C20       5. Irritable bowel syndrome with diarrhea  K58.0           1. His diabetic control remains excellent with an A1c of 5.8 today. He will continue with a healthy diet and active lifestyle. He has cut back on the alcohol too which is great. Will plan on recheck next July for his annual exam.    2. I recommend home stretching a long with a daily glucosamine supplement. Continue ibuprofen or Tylenol as needed.    3. Blood pressure is stable.    4. Colonoscopy stable in 2022. No further colonoscopies are warranted.     5. I am happy to fill his colestipol when needed.    The longitudinal plan of care for the diagnosis(es)/condition(s) as documented were addressed during this visit. Due to the added complexity in care, I will continue to support Filemon in the subsequent management and with ongoing continuity of care.    Subjective   Filemon is a 79 year old, presenting for the following health issues:  Diabetes        11/20/2024    10:12 AM   Additional Questions   Roomed by Brisa CALIXTO     History of Present Illness       Diabetes:   He presents for follow up of diabetes.  He is checking home blood glucose a few times a month.   He checks blood glucose before and after meals.  Blood glucose is never over 200 and never under 70.  When his blood glucose is low, the patient is asymptomatic for confusion, blurred vision, lethargy and reports not feeling dizzy, shaky, or weak.   He has no concerns regarding his diabetes at this time.   He is not experiencing numbness or burning in feet, excessive thirst, blurry vision, weight changes or redness, sores or blisters on feet.   "         He eats 0-1 servings of fruits and vegetables daily.He consumes 0 sweetened beverage(s) daily.He exercises with enough effort to increase his heart rate 10 to 19 minutes per day.  He exercises with enough effort to increase his heart rate 3 or less days per week.   He is taking medications regularly.       He has cut back on his alcohol intake to 2 mixed drinks per night. He is also not eating sugar as he is focusing on more vegetables and low fat foods. He is trying to be more active as well.    He describes continued multiple joint pain due to arthritis in his shoulders, hands and knees. He takes ibuprofen intermittently with some benefit.     Review of Systems  Constitutional, HEENT, cardiovascular, pulmonary, gi and gu systems are negative, except as otherwise noted.      Objective    /78   Pulse 91   Temp 97.4  F (36.3  C) (Temporal)   Resp 20   Ht 1.68 m (5' 6.14\")   Wt 98.4 kg (217 lb)   SpO2 97%   BMI 34.87 kg/m    Body mass index is 34.87 kg/m .  Physical Exam   GENERAL: alert and no distress  RESP: lungs clear to auscultation - no rales, rhonchi or wheezes  CV: regular rate and rhythm, normal S1 S2, no S3 or S4, no murmur, click or rub, no peripheral edema  NEURO: Normal strength and tone, mentation intact and speech normal. Gait is stable.  PSYCH: mentation appears normal, affect normal/bright    Lab Results   Component Value Date    A1C 5.8 11/20/2024    A1C 5.6 05/09/2024    A1C 5.6 07/20/2023    A1C 5.8 07/13/2022    A1C 5.4 10/20/2021    A1C 5.5 05/17/2021    A1C 5.6 08/05/2020    A1C 7.1 05/08/2019    A1C 5.8 12/01/2017    A1C 6.1 07/17/2015             Signed Electronically by: Barak Cameron PA-C    "

## 2024-12-30 DIAGNOSIS — K58.0 IRRITABLE BOWEL SYNDROME WITH DIARRHEA: ICD-10-CM

## 2024-12-30 RX ORDER — COLESTIPOL HYDROCHLORIDE 1 G/1
1 TABLET ORAL DAILY
Qty: 90 TABLET | Refills: 0 | Status: SHIPPED | OUTPATIENT
Start: 2024-12-30

## 2024-12-30 NOTE — TELEPHONE ENCOUNTER
Per patient, he stated [facility] would refill this prescription for him despite being prescribed by another provider.

## 2025-02-22 ENCOUNTER — HEALTH MAINTENANCE LETTER (OUTPATIENT)
Age: 80
End: 2025-02-22

## 2025-03-04 DIAGNOSIS — K58.0 IRRITABLE BOWEL SYNDROME WITH DIARRHEA: ICD-10-CM

## 2025-03-04 RX ORDER — COLESTIPOL HYDROCHLORIDE 1 G/1
1 TABLET ORAL DAILY
Qty: 90 TABLET | Refills: 1 | Status: SHIPPED | OUTPATIENT
Start: 2025-03-04

## 2025-03-06 DIAGNOSIS — E78.5 HYPERLIPIDEMIA LDL GOAL <100: ICD-10-CM

## 2025-03-06 RX ORDER — ATORVASTATIN CALCIUM 10 MG/1
10 TABLET, FILM COATED ORAL DAILY
Qty: 90 TABLET | Refills: 0 | Status: SHIPPED | OUTPATIENT
Start: 2025-03-06

## 2025-05-24 ENCOUNTER — HEALTH MAINTENANCE LETTER (OUTPATIENT)
Age: 80
End: 2025-05-24

## 2025-06-13 DIAGNOSIS — E78.5 HYPERLIPIDEMIA LDL GOAL <100: ICD-10-CM

## 2025-06-16 RX ORDER — ATORVASTATIN CALCIUM 10 MG/1
10 TABLET, FILM COATED ORAL DAILY
Qty: 90 TABLET | Refills: 0 | Status: SHIPPED | OUTPATIENT
Start: 2025-06-16

## 2025-07-28 ENCOUNTER — OFFICE VISIT (OUTPATIENT)
Dept: FAMILY MEDICINE | Facility: OTHER | Age: 80
End: 2025-07-28
Payer: COMMERCIAL

## 2025-07-28 VITALS
RESPIRATION RATE: 21 BRPM | OXYGEN SATURATION: 97 % | HEIGHT: 66 IN | WEIGHT: 218 LBS | SYSTOLIC BLOOD PRESSURE: 142 MMHG | TEMPERATURE: 97.9 F | HEART RATE: 90 BPM | BODY MASS INDEX: 35.03 KG/M2 | DIASTOLIC BLOOD PRESSURE: 86 MMHG

## 2025-07-28 DIAGNOSIS — R26.81 UNSTEADINESS: ICD-10-CM

## 2025-07-28 DIAGNOSIS — F10.90 ALCOHOL USE: ICD-10-CM

## 2025-07-28 DIAGNOSIS — Z00.00 MEDICARE ANNUAL WELLNESS VISIT, SUBSEQUENT: Primary | ICD-10-CM

## 2025-07-28 DIAGNOSIS — E11.9 TYPE 2 DIABETES MELLITUS WITHOUT COMPLICATION, WITHOUT LONG-TERM CURRENT USE OF INSULIN (H): ICD-10-CM

## 2025-07-28 DIAGNOSIS — E66.01 CLASS 2 SEVERE OBESITY WITH BODY MASS INDEX (BMI) OF 35 TO 39.9 WITH SERIOUS COMORBIDITY (H): ICD-10-CM

## 2025-07-28 DIAGNOSIS — K58.0 IRRITABLE BOWEL SYNDROME WITH DIARRHEA: ICD-10-CM

## 2025-07-28 DIAGNOSIS — R23.3 EASY BRUISING: ICD-10-CM

## 2025-07-28 DIAGNOSIS — E66.812 CLASS 2 SEVERE OBESITY WITH BODY MASS INDEX (BMI) OF 35 TO 39.9 WITH SERIOUS COMORBIDITY (H): ICD-10-CM

## 2025-07-28 DIAGNOSIS — C20 MALIGNANT NEOPLASM OF RECTUM (H): ICD-10-CM

## 2025-07-28 DIAGNOSIS — I10 ESSENTIAL HYPERTENSION WITH GOAL BLOOD PRESSURE LESS THAN 140/90: ICD-10-CM

## 2025-07-28 DIAGNOSIS — E78.5 HYPERLIPIDEMIA LDL GOAL <100: ICD-10-CM

## 2025-07-28 LAB
ALBUMIN SERPL BCG-MCNC: 4 G/DL (ref 3.5–5.2)
ALP SERPL-CCNC: 95 U/L (ref 40–150)
ALT SERPL W P-5'-P-CCNC: 34 U/L (ref 0–70)
ANION GAP SERPL CALCULATED.3IONS-SCNC: 10 MMOL/L (ref 7–15)
AST SERPL W P-5'-P-CCNC: 29 U/L (ref 0–45)
BILIRUB SERPL-MCNC: 0.5 MG/DL
BUN SERPL-MCNC: 18 MG/DL (ref 8–23)
CALCIUM SERPL-MCNC: 9.3 MG/DL (ref 8.8–10.4)
CHLORIDE SERPL-SCNC: 103 MMOL/L (ref 98–107)
CHOLEST SERPL-MCNC: 160 MG/DL
CREAT SERPL-MCNC: 1.01 MG/DL (ref 0.67–1.17)
CREAT UR-MCNC: 105.9 MG/DL
EGFRCR SERPLBLD CKD-EPI 2021: 75 ML/MIN/1.73M2
ERYTHROCYTE [DISTWIDTH] IN BLOOD BY AUTOMATED COUNT: 11.7 % (ref 10–15)
EST. AVERAGE GLUCOSE BLD GHB EST-MCNC: 117 MG/DL
FASTING STATUS PATIENT QL REPORTED: YES
FASTING STATUS PATIENT QL REPORTED: YES
GLUCOSE SERPL-MCNC: 115 MG/DL (ref 70–99)
HBA1C MFR BLD: 5.7 % (ref 0–5.6)
HCO3 SERPL-SCNC: 25 MMOL/L (ref 22–29)
HCT VFR BLD AUTO: 39.6 % (ref 40–53)
HDLC SERPL-MCNC: 52 MG/DL
HGB BLD-MCNC: 13.5 G/DL (ref 13.3–17.7)
LDLC SERPL CALC-MCNC: 94 MG/DL
MCH RBC QN AUTO: 32.7 PG (ref 26.5–33)
MCHC RBC AUTO-ENTMCNC: 34.1 G/DL (ref 31.5–36.5)
MCV RBC AUTO: 96 FL (ref 78–100)
MICROALBUMIN UR-MCNC: <12 MG/L
MICROALBUMIN/CREAT UR: NORMAL MG/G{CREAT}
NONHDLC SERPL-MCNC: 108 MG/DL
PLATELET # BLD AUTO: 272 10E3/UL (ref 150–450)
POTASSIUM SERPL-SCNC: 4.4 MMOL/L (ref 3.4–5.3)
PROT SERPL-MCNC: 7.1 G/DL (ref 6.4–8.3)
RBC # BLD AUTO: 4.13 10E6/UL (ref 4.4–5.9)
SODIUM SERPL-SCNC: 138 MMOL/L (ref 135–145)
TRIGL SERPL-MCNC: 72 MG/DL
WBC # BLD AUTO: 7.5 10E3/UL (ref 4–11)

## 2025-07-28 PROCEDURE — 99214 OFFICE O/P EST MOD 30 MIN: CPT | Performed by: PHYSICIAN ASSISTANT

## 2025-07-28 PROCEDURE — G0438 PPPS, INITIAL VISIT: HCPCS | Mod: 25 | Performed by: PHYSICIAN ASSISTANT

## 2025-07-28 PROCEDURE — 82043 UR ALBUMIN QUANTITATIVE: CPT | Performed by: PHYSICIAN ASSISTANT

## 2025-07-28 PROCEDURE — 3044F HG A1C LEVEL LT 7.0%: CPT | Performed by: PHYSICIAN ASSISTANT

## 2025-07-28 PROCEDURE — 85027 COMPLETE CBC AUTOMATED: CPT | Performed by: PHYSICIAN ASSISTANT

## 2025-07-28 PROCEDURE — 3079F DIAST BP 80-89 MM HG: CPT | Performed by: PHYSICIAN ASSISTANT

## 2025-07-28 PROCEDURE — 82570 ASSAY OF URINE CREATININE: CPT | Performed by: PHYSICIAN ASSISTANT

## 2025-07-28 PROCEDURE — 99207 PR FOOT EXAM NO CHARGE: CPT | Performed by: PHYSICIAN ASSISTANT

## 2025-07-28 PROCEDURE — 1126F AMNT PAIN NOTED NONE PRSNT: CPT | Performed by: PHYSICIAN ASSISTANT

## 2025-07-28 PROCEDURE — 80053 COMPREHEN METABOLIC PANEL: CPT | Performed by: PHYSICIAN ASSISTANT

## 2025-07-28 PROCEDURE — 80061 LIPID PANEL: CPT | Performed by: PHYSICIAN ASSISTANT

## 2025-07-28 PROCEDURE — 36415 COLL VENOUS BLD VENIPUNCTURE: CPT | Performed by: PHYSICIAN ASSISTANT

## 2025-07-28 PROCEDURE — G2211 COMPLEX E/M VISIT ADD ON: HCPCS | Performed by: PHYSICIAN ASSISTANT

## 2025-07-28 PROCEDURE — 3077F SYST BP >= 140 MM HG: CPT | Performed by: PHYSICIAN ASSISTANT

## 2025-07-28 PROCEDURE — 83036 HEMOGLOBIN GLYCOSYLATED A1C: CPT | Performed by: PHYSICIAN ASSISTANT

## 2025-07-28 RX ORDER — COLESTIPOL HYDROCHLORIDE 1 G/1
1 TABLET ORAL DAILY
Qty: 90 TABLET | Refills: 3 | Status: SHIPPED | OUTPATIENT
Start: 2025-07-28

## 2025-07-28 RX ORDER — ATORVASTATIN CALCIUM 10 MG/1
10 TABLET, FILM COATED ORAL DAILY
Qty: 90 TABLET | Refills: 3 | Status: SHIPPED | OUTPATIENT
Start: 2025-07-28

## 2025-07-28 RX ORDER — LISINOPRIL 10 MG/1
10 TABLET ORAL DAILY
Qty: 90 TABLET | Refills: 3 | Status: SHIPPED | OUTPATIENT
Start: 2025-07-28

## 2025-07-28 SDOH — HEALTH STABILITY: PHYSICAL HEALTH: ON AVERAGE, HOW MANY DAYS PER WEEK DO YOU ENGAGE IN MODERATE TO STRENUOUS EXERCISE (LIKE A BRISK WALK)?: 3 DAYS

## 2025-07-28 SDOH — HEALTH STABILITY: PHYSICAL HEALTH: ON AVERAGE, HOW MANY MINUTES DO YOU ENGAGE IN EXERCISE AT THIS LEVEL?: 50 MIN

## 2025-07-28 ASSESSMENT — PATIENT HEALTH QUESTIONNAIRE - PHQ9
SUM OF ALL RESPONSES TO PHQ QUESTIONS 1-9: 5
SUM OF ALL RESPONSES TO PHQ QUESTIONS 1-9: 5
10. IF YOU CHECKED OFF ANY PROBLEMS, HOW DIFFICULT HAVE THESE PROBLEMS MADE IT FOR YOU TO DO YOUR WORK, TAKE CARE OF THINGS AT HOME, OR GET ALONG WITH OTHER PEOPLE: NOT DIFFICULT AT ALL

## 2025-07-28 ASSESSMENT — PAIN SCALES - GENERAL: PAINLEVEL_OUTOF10: NO PAIN (0)

## 2025-07-28 ASSESSMENT — SOCIAL DETERMINANTS OF HEALTH (SDOH): HOW OFTEN DO YOU GET TOGETHER WITH FRIENDS OR RELATIVES?: NEVER

## 2025-07-28 NOTE — PROGRESS NOTES
Preventive Care Visit  [facility]  Barak Cameron PA-C, Family Medicine  Jul 28, 2025    Assessment & Plan       ICD-10-CM    1. Medicare annual wellness visit, subsequent  Z00.00       2. Hyperlipidemia LDL goal <100  E78.5 Lipid panel reflex to direct LDL Fasting     Lipid panel reflex to direct LDL Fasting     atorvastatin (LIPITOR) 10 MG tablet      3. Type 2 diabetes mellitus without complication, without long-term current use of insulin (H)  E11.9 Hemoglobin A1c     Comprehensive metabolic panel (BMP + Alb, Alk Phos, ALT, AST, Total. Bili, TP)     Albumin Random Urine Quantitative with Creat Ratio     FOOT EXAM     Hemoglobin A1c     Comprehensive metabolic panel (BMP + Alb, Alk Phos, ALT, AST, Total. Bili, TP)     Albumin Random Urine Quantitative with Creat Ratio      4. Class 2 severe obesity with body mass index (BMI) of 35 to 39.9 with serious comorbidity (H)  E66.812     E66.01       5. Essential hypertension with goal blood pressure less than 140/90  I10 lisinopril (ZESTRIL) 10 MG tablet      6. Alcohol use  F10.90       7. Irritable bowel syndrome with diarrhea  K58.0 colestipol (COLESTID) 1 g tablet      8. Unsteadiness  R26.81       9. Easy bruising  R23.3 CBC with platelets     CBC with platelets      10. Malignant neoplasm of rectum (H)  C20             1. Medicare wellness visit completed.    2. Updated fasting lipid panel ordered. Continue atorvastatin.    3-4. His diabetic control remains excellent with a hemoglobin A1c of 5.7 today. He will continue to work on a well balanced diet and more routine walking to help with diabetic control and weight loss. Recheck in 6 months.    5. Blood pressure slightly elevated during both readings today. I recommend close home monitoring and if consistently above 140/90, he will let me know. Continue lisinopril.    6, 8-9. I recommend he cut back on the alcohol to at most 1 shot daily or just a few drinks per week. This can help with  "his easy bruising, unsteadiness and mentation. Will check platelets today and he will avoid frequent use of ibuprofen.    7. Continue Imodium and colestipol.    10. Distant history of rectal cancer. Stable colonoscopy in 2022.    The longitudinal plan of care for the diagnosis(es)/condition(s) as documented were addressed during this visit. Due to the added complexity in care, I will continue to support Filemon in the subsequent management and with ongoing continuity of care.    Follow-up in 6 months.      Patient has been advised of split billing requirements and indicates understanding: Yes    BMI  Estimated body mass index is 35.67 kg/m  as calculated from the following:    Height as of this encounter: 1.665 m (5' 5.55\").    Weight as of this encounter: 98.9 kg (218 lb).   Weight management plan: Discussed healthy diet and exercise guidelines    Counseling  Appropriate preventive services were addressed with this patient via screening, questionnaire, or discussion as appropriate for fall prevention, nutrition, physical activity, Tobacco-use cessation, social engagement, weight loss and cognition.  Checklist reviewing preventive services available has been given to the patient.  Reviewed patient's diet, addressing concerns and/or questions.   He is at risk for lack of exercise and has been provided with information to increase physical activity for the benefit of his well-being.   Patient is at risk for social isolation and has been provided with information about the benefit of social connection.   The patient reports drinking more than 3 alcoholic drinks per day and/or more than 7 drhnks per week. The patient was counseled and given information about possible harmful effects of excessive alcohol intake.    Subjective   Filemon is a 80 year old, presenting for the following:  Physical        7/28/2025    10:27 AM   Additional Questions   Roomed by Brisa DIAMOND   Accompanied by Self         HPI      He is still drinking 3-4 " shots of liquor with water per night. It is just a habit at this point. He describes easy bruising over the past 6 months and continues to notice some unsteadiness when walking. He uses a cane when needed and has been taking the stairs more to strengthen his legs.     Advance Care Planning    Advance care planning document is on file but is outdated.  Patient encouraged to update or provider to update POLST.        7/28/2025   General Health   How would you rate your overall physical health? Good   Feel stress (tense, anxious, or unable to sleep) Only a little   (!) STRESS CONCERN      7/28/2025   Nutrition   Diet: Regular (no restrictions)         7/28/2025   Exercise   Days per week of moderate/strenous exercise 3 days   Average minutes spent exercising at this level 50 min         7/28/2025   Social Factors   Frequency of gathering with friends or relatives Never   Worry food won't last until get money to buy more No   Food not last or not have enough money for food? No   Do you have housing? (Housing is defined as stable permanent housing and does not include staying outside in a car, in a tent, in an abandoned building, in an overnight shelter, or couch-surfing.) Yes   Are you worried about losing your housing? No   Lack of transportation? No   Unable to get utilities (heat,electricity)? No   (!) SOCIAL CONNECTIONS CONCERN      7/28/2025   Fall Risk   Fallen 2 or more times in the past year? No    Trouble with walking or balance? Yes        Proxy-reported         7/28/2025   Activities of Daily Living- Home Safety   Needs help with the following daily activites None of the above   Safety concerns in the home None of the above         7/28/2025   Dental   Dentist two times every year? Yes         7/28/2025   Hearing Screening   Hearing concerns? None of the above         7/28/2025   Driving Risk Screening   Patient/family members have concerns about driving No         7/28/2025   General Alertness/Fatigue  Screening   Have you been more tired than usual lately? No         7/28/2025   Urinary Incontinence Screening   Bothered by leaking urine in past 6 months No       Today's PHQ-9 Score:       7/28/2025    10:19 AM   PHQ-9 SCORE   PHQ-9 Total Score MyChart 5 (Mild depression)   PHQ-9 Total Score 5        Proxy-reported         7/28/2025   Substance Use   Alcohol more than 3/day or more than 7/wk Yes   How often do you have a drink containing alcohol 4 or more times a week   How many alcohol drinks on typical day 3 or 4   How often do you have 5+ drinks at one occasion Never   Audit 2/3 Score 1   How often not able to stop drinking once started Never   How often failed to do what normally expected Never   How often needed first drink in am after a heavy drinking session Never   How often feeling of guilt or remorse after drinking Never   How often unable to remember what happened the night before Never   Have you or someone else been injured because of your drinking No   Has anyone been concerned or suggested you cut down on drinking No   TOTAL SCORE - AUDIT 5   Do you have a current opioid prescription? No   How severe/bad is pain from 1 to 10? 0/10 (No Pain)   Do you use any other substances recreationally? No     Social History     Tobacco Use    Smoking status: Never    Smokeless tobacco: Never    Tobacco comments:     No smokers in home   Vaping Use    Vaping status: Never Used   Substance Use Topics    Alcohol use: Yes     Comment: 3-4 drinks per night    Drug use: No     Comment: caffeine 3-4 daily       Reviewed and updated as needed this visit by Provider   Tobacco  Allergies  Meds  Problems  Med Hx  Surg Hx  Fam Hx            Current providers sharing in care for this patient include:  Patient Care Team:  Barak Cameron PA-C as PCP - General (Physician Assistant)  Barak Cameron PA-C as Assigned PCP  Laly Poon, RN as Diabetes Educator (Diabetes Education)  Margarito Krishnan PA-C as  "Physician Assistant (Gastroenterology)  Margarito Krishnan PA-C as Assigned Gastroenterology Provider    The following health maintenance items are reviewed in Epic and correct as of today:  Health Maintenance   Topic Date Due    DEPRESSION ACTION PLAN  Never done    RSV VACCINE (1 - 1-dose 75+ series) Never done    COVID-19 VACCINE (6 - 2024-25 season) 09/01/2024    BMP  05/09/2025    LIPID  05/09/2025    MICROALBUMIN  07/25/2025    INFLUENZA VACCINE (1) 09/01/2025    EYE EXAM  10/07/2025    A1C  10/28/2025    PHQ-9  01/28/2026    DIABETIC FOOT EXAM  07/25/2026    MEDICARE ANNUAL WELLNESS VISIT  07/28/2026    ANNUAL REVIEW OF HM ORDERS  07/28/2026    FALL RISK ASSESSMENT  07/28/2026    DTAP/TDAP/TD VACCINE (3 - Td or Tdap) 08/08/2028    ADVANCE CARE PLANNING  07/25/2029    PNEUMOCOCCAL VACCINE 50+ YEARS  Completed    HPV VACCINE (No Doses Required) Completed    ZOSTER VACCINE  Completed    MENINGITIS VACCINE  Aged Out    TSH W/FREE T4 REFLEX  Discontinued    COLORECTAL CANCER SCREENING  Discontinued         Review of Systems  Constitutional, HEENT, cardiovascular, pulmonary, GI, , musculoskeletal, neuro, skin, endocrine and psych systems are negative, except as otherwise noted.     Objective    Exam  BP (!) 142/86   Pulse 90   Temp 97.9  F (36.6  C) (Temporal)   Resp 21   Ht 1.665 m (5' 5.55\")   Wt 98.9 kg (218 lb)   SpO2 97%   BMI 35.67 kg/m     Estimated body mass index is 35.67 kg/m  as calculated from the following:    Height as of this encounter: 1.665 m (5' 5.55\").    Weight as of this encounter: 98.9 kg (218 lb).    Physical Exam  GENERAL: healthy, alert and no distress  EYES: Eyes grossly normal to inspection, PERRL and conjunctivae and sclerae normal  HENT: ear canals and TM's normal, nose and mouth without ulcers or lesions  NECK: no adenopathy, no asymmetry, masses, or scars and thyroid normal to palpation  RESP: lungs clear to auscultation - no rales, rhonchi or wheezes  CV: regular rate and " rhythm, normal S1 S2, no S3 or S4, no murmur, click or rub, no peripheral edema and peripheral pulses strong  ABDOMEN: soft, nontender, no hepatosplenomegaly, no masses and bowel sounds normal  MS: no gross musculoskeletal defects noted, no edema. FROM to all extremities. No spinal tenderness.   SKIN: no suspicious lesions or rashes  NEURO: Normal strength and tone, mentation intact and speech normal. Cranial nerves II-XII are grossly intact. DTRs are 2+/4 throughout and symmetric. Gait is stable.   PSYCH: mentation appears normal, affect normal/bright  Diabetic foot exam: normal DP and PT pulses, yellowed, dystrophic nails bilaterally, callused and blister like nodules over lateral 3rd toes bilaterally (chronic) normal sensory exam, and normal monofilament exam, except for decreased monofilament sensation over areas 9 bilaterally            Lab Results   Component Value Date    A1C 5.7 07/28/2025    A1C 5.8 11/20/2024    A1C 5.6 05/09/2024    A1C 5.6 07/20/2023    A1C 5.8 07/13/2022    A1C 5.5 05/17/2021    A1C 5.6 08/05/2020    A1C 7.1 05/08/2019    A1C 5.8 12/01/2017    A1C 6.1 07/17/2015 7/28/2025   Mini Cog   Clock Draw Score 0 Abnormal   3 Item Recall 2 objects recalled   Mini Cog Total Score 2              Signed Electronically by: Barak Cameron PA-C     Clothing

## 2025-07-28 NOTE — PATIENT INSTRUCTIONS
"Patient Education     Your diabetes remains well controlled.    Cut back on the drinking to just a few times per week or just 1 shot daily.    This will help the bruising and balance.    Follow-up in 6 months.  Preventive Care Advice   This is general advice we often give to help people stay healthy. Your care team may have specific advice just for you. Please talk to your care team about your own preventive care needs.  Lifestyle  Exercise at least 150 minutes each week (30 minutes a day, 5 days a week).  Do muscle strengthening activities 2 days a week. These help control your weight and prevent disease.  No smoking.  Wear sunscreen to prevent skin cancer.  Take time with family and friends.  Have your home tested for radon every 2 to 5 years. Radon is a colorless, odorless gas that can harm your lungs. To learn more, go to www.health.[facility].mn.us and search for \"Radon in Homes.\"  Keep guns unloaded and locked up in a safe place like a safe or gun vault, or, use a gun lock and hide the keys. Always lock away bullets separately. To learn more, visit [employer]OST[employer].[place].gov and search for \"safe gun storage.\"  Nutrition  Eat 5 or more servings of fruits and vegetables each day.  Try wheat bread, brown rice and whole grain pasta (instead of white bread, rice, and pasta).  Get enough calcium and vitamin D. Check the label on foods and aim for 100% of the RDA (recommended daily allowance).  Regular exams  Have a dental exam and cleaning every 6 months.  Older adults: Ask your care team how often to have memory testing.  See your health care team every year to talk about:  Any changes in your health.  Any medicines your care team has prescribed.  Preventive care, family planning, and ways to prevent chronic diseases.  Shots (vaccines)   HPV shots (up to age 26), if you've never had them before.  Hepatitis B shots (up to age 59), if you've never had them before.  COVID-19 shot: Get this shot when it's due.  Flu shot: Get a flu shot " every year.  Tetanus shot: Get a tetanus shot every 10 years.  Pneumococcal, hepatitis A, and RSV shots: Ask your care team if you need these based on your risk.  Shingles shot (for age 50 and up).  General health tests  Diabetes screening:  Starting at age 35, Get screened for diabetes at least every 3 years.  If you are younger than age 35, ask your care team if you should be screened for diabetes.  Cholesterol test: At age 39, start having a cholesterol test every 5 years, or more often if advised.  Bone density scan (DEXA): At age 50, ask your care team if you should have this scan for osteoporosis (brittle bones).  Hepatitis C: Get tested at least once in your life.  Abdominal aortic aneurysm screening: Talk to your doctor about having this screening if you:  Have ever smoked; and  Are biologically male; and  Are between the ages of 65 and 75.  STIs (sexually transmitted infections)  Before age 24: Ask your care team if you should be screened for STIs.  After age 24: Get screened for STIs if you're at risk. You are at risk for STIs (including HIV) if:  You are sexually active with more than one person.  You don't use condoms every time.  You or a partner was diagnosed with a sexually transmitted infection.  If you are at risk for HIV, ask about PrEP medicine to prevent HIV.  Get tested for HIV at least once in your life, whether you are at risk for HIV or not.  Cancer screening tests  Cervical cancer screening: If you have a cervix, begin getting regular cervical cancer screening tests at age 21. Most people who have regular screenings with normal results can stop after age 65. Talk about this with your provider.  Breast cancer scan (mammogram): If you've ever had breasts, begin having regular mammograms starting at age 40. This is a scan to check for breast cancer.  Colon cancer screening: It is important to start screening for colon cancer at age 45.  Have a colonoscopy test every 10 years (or more often if  you're at risk) Or, ask your provider about stool tests like a FIT test every year or Cologuard test every 3 years.  To learn more about your testing options, visit: [email or web address]/162147.pdf.  For help making a decision, visit: sol/ta77709.  Prostate cancer screening test: If you have a prostate and are age 55 to 69, ask your provider if you would benefit from a yearly prostate cancer screening test.  Lung cancer screening: If you are a current or former smoker age 50 to 80, ask your care team if ongoing lung cancer screenings are right for you.    For informational purposes only. Not to replace the advice of your health care provider. Copyright   2023 [employer]. All rights reserved. Clinically reviewed by the [facility]. [employer] 238543 - REV 6/25.  Preventing Falls: Care Instructions  Injuries and health problems such as trouble walking or poor eyesight can increase your risk of falling. So can some medicines. But there are things you can do to help prevent falls. You can exercise to get stronger. You can also arrange your home to make it safer.    Talk to your doctor about the medicines you take. Ask if any of them increase the risk of falls and whether they can be changed or stopped.   Try to exercise regularly. It can help improve your strength and balance. This can help lower your risk of falling.         Practice fall safety and prevention.   Wear low-heeled shoes that fit well and give your feet good support. Talk to your doctor if you have foot problems that make this hard.  Carry a cellphone or wear a medical alert device that you can use to call for help.  Use stepladders instead of chairs to reach high objects. Don't climb if you're at risk for falls. Ask for help, if needed.  Wear the correct eyeglasses, if you need them.        Make your home safer.   Remove rugs, cords, clutter, and furniture from walkways.  Keep your house well lit. Use night-lights in  "hallways and bathrooms.  Install and use sturdy handrails on stairways.  Wear nonskid footwear, even inside. Don't walk barefoot or in socks without shoes.        Be safe outside.   Use handrails, curb cuts, and ramps whenever possible.  Keep your hands free by using a shoulder bag or backpack.  Try to walk in well-lit areas. Watch out for uneven ground, changes in pavement, and debris.  Be careful in the winter. Walk on the grass or gravel when sidewalks are slippery. Use de-icer on steps and walkways. Add non-slip devices to shoes.    Put grab bars and nonskid mats in your shower or tub and near the toilet. Try to use a shower chair or bath bench when bathing.   Get into a tub or shower by putting in your weaker leg first. Get out with your strong side first. Have a phone or medical alert device in the bathroom with you.   Where can you learn more?  Go to https://www.[employer].net/patiented  Enter G117 in the search box to learn more about \"Preventing Falls: Care Instructions.\"  Current as of: July 31, 2024  Content Version: 14.5 2024-2025 [employer].   Care instructions adapted under license by your healthcare professional. If you have questions about a medical condition or this instruction, always ask your healthcare professional. [employer] disclaims any warranty or liability for your use of this information.    Relationships for Good Health  Relationships are important for our health and happiness. Social isolation, loneliness and lack of support are bad for your health. Studies show that loneliness can harm health and limit your life span as much as high blood pressure and smoking.   Take some time to reflect on your relationships. Then answer these questions:  Are there people in your life that cause you stress or drain your energy? What can you do to set " limits?  ________________________________________________________________________________________________________________________________________________________________________________________________________________________________________________________________________________________________________________________________________________  Who do you enjoy spending time with? Who can you go to for support?  ________________________________________________________________________________________________________________________________________________________________________________________________________________________________________________________________________________________________________________________________________________  What can you do to improve your relationships with others?  __________________________________________________________________________________________________________________________________________________________________________________________________________________  ______________________________________________________________________________________________________________________________  What do you like most about your relationships with others?  ________________________________________________________________________________________________________________________________________________________________________________________________________________________________________________________________________________________________________________________________________________  My goal: ______________________________________________________________________  I will: ______________________________________________________________________________________________________________________________________________________________________________________________    For informational purposes only. Not to replace the advice of your health care provider. Copyright   2018  [facility]. All rights reserved. Clinically reviewed by [facility]  Team. [employer] 074455 - Rev 06/24.  Recovering From Depression: Care Instructions  Overview    Sticking to your treatment plan is important as you recover from depression. It may take time for your symptoms to get better after you start treatment. Try not to give up if you don't feel better right away. Make sure you keep going to counseling and taking any prescribed medicine if they are part of your treatment plan.  Focus on things that can help you feel better, such as being with friends and family. Try to eat healthy foods, be active, and get enough sleep. Take things slowly as you begin to recover.  Follow-up care is a key part of your treatment and safety. Be sure to make and go to all appointments, and call your doctor if you are having problems. It's also a good idea to know your test results and keep a list of the medicines you take.  How can you care for yourself at home?  Be realistic  If you have a large task to do, break it up into smaller steps you can handle, and just do what you can.  You may want to put off important decisions until your depression has lifted. If you have plans that will have a major impact on your life, such as marriage, divorce, or a job change, try to wait a bit. Talk it over with friends and loved ones who can help you look at the overall picture first.  Reaching out to people for help is important. Do not isolate yourself. Let your family and friends help you. Find someone you can trust and confide in, and talk to that person.  Be patient, and be kind to yourself. Remember that depression is not your fault and is not something you can overcome with willpower alone. Treatment is important for depression, just like for any other illness. Feeling better takes time, and your mood will improve little by little.  Stay active  Stay busy and get outside. Take a walk, or try some other light  exercise.  Talk with your doctor about an exercise program. Exercise can help with mild depression.  Go to a movie or concert. Take part in a Anabaptist activity or other social gathering. Go to a ball game.  Ask a friend to have dinner with you.  Take care of yourself  Eat healthy foods such as fresh fruits and vegetables, whole grains, and lean protein. If you have lost your appetite, eat small snacks rather than large meals.  Avoid using marijuana and other drugs and drinking alcohol. Do not take medicines that have not been prescribed for you. They may interfere with medicines you may be taking for depression, or they may make your depression worse.  Take your medicines exactly as they are prescribed. You may start to feel better within 1 to 3 weeks of taking antidepressant medicine. But it can take as many as 6 to 8 weeks to see more improvement. If you have questions or concerns about your medicines, or if you do not notice any improvement by 3 weeks, talk to your doctor.  Continue to take your medicine after your symptoms improve. Taking your medicine for at least 6 months after you feel better can help keep you from getting depressed again. If this isn't the first time you have been depressed, your doctor may recommend you to take medicine even longer.  If you have any side effects from your medicine, tell your doctor. Many side effects are mild and will go away on their own after you have been taking the medicine for a few weeks. Some may last longer. Talk to your doctor if side effects are bothering you too much. You might be able to try a different medicine.  Continue counseling. It may help prevent depression from returning, especially if you've had multiple episodes of depression. Talk with your counselor if you are having a hard time attending your sessions or you think the sessions aren't working. Don't just stop going.  Get enough sleep. Talk to your doctor if you are having problems sleeping.  Avoid  sleeping pills unless they are prescribed by the doctor treating your depression. Sleeping pills may make you groggy during the day, and they may interact with other medicine you are taking.  If you have any other illnesses, such as diabetes, heart disease, or high blood pressure, make sure to continue with your treatment. Tell your doctor about all of the medicines you take, including those with or without a prescription.  Where to get help 24 hours a day, 7 days a week  If you or someone you know talks about suicide, self-harm, a mental health crisis, a substance use crisis, or any other kind of emotional distress, get help right away. You can:  Call the Suicide and Crisis Lifeline at [employer].  Text HOME to 011979 to access the Crisis Text Line.  Consider saving these numbers in your phone.  Go to velingo for more information or to chat online.  Call 911 anytime you think you may need emergency care. For example, call if:  You feel like hurting yourself or someone else.  Someone you know has depression and is about to attempt or is attempting suicide.  Where to get help 24 hours a day, 7 days a week  If you or someone you know talks about suicide, self-harm, a mental health crisis, a substance use crisis, or any other kind of emotional distress, get help right away. You can:  Call the Suicide and Crisis Lifeline at [employer].  Text HOME to 375764 to access the Crisis Text Line.  Consider saving these numbers in your phone.  Go to velingo for more information or to chat online.  Call your doctor now or seek immediate medical care if:  You hear voices.  Someone you know has depression and:  Starts to give away possessions.  Uses illegal drugs or drinks alcohol heavily.  Talks or writes about death, including writing suicide notes or talking about guns, knives, or pills.  Starts to spend a lot of time alone.  Acts very aggressively or suddenly appears calm.  Watch closely for changes in your health, and be sure  "to contact your doctor if:  You do not get better as expected.  Where can you learn more?  Go to https://www.healthMagnomatics.net/patiented  Enter N529 in the search box to learn more about \"Recovering From Depression: Care Instructions.\"  Current as of: July 31, 2024  Content Version: 14.5 2024-2025 [employer].   Care instructions adapted under license by your healthcare professional. If you have questions about a medical condition or this instruction, always ask your healthcare professional. [employer] disclaims any warranty or liability for your use of this information.    9 Ways to Cut Back on Drinking  Maybe you've found yourself drinking more alcohol than you'd prefer. If you want to cut back, here are some ideas to try.    Think before you drink.  Do you really want a drink, or is it just a habit? If you're used to having a drink at a certain time, try doing something else then.     Look for substitutes.  Find some no-alcohol drinks that you enjoy, like flavored seltzer water, tea with honey, or tonic with a slice of lime. Or try alcohol-free beer or \"virgin\" cocktails (without the alcohol).     Drink more water.  Use water to quench your thirst. Drink a glass of water before you have any alcohol. Have another glass along with every drink or between drinks.     Shrink your drink.  For example, have a bottle of beer instead of a pint. Use a smaller glass for wine. Choose drinks with lower alcohol content (ABV%). Or use less liquor and more mixer in cocktails.     Slow down.  It's easy to drink quickly and without thinking about it. Pay attention, and make each drink last longer.     Do the math.  Total up how much you spend on alcohol each month. How much is that a year? If you cut back, what could you do with the money you save?     Take a break.  Choose a day or two each week when you won't drink at all. Notice how you feel on those days, physically and emotionally. How did you sleep? Do " "you feel better? Over time, add more break days.     Count calories.  Would you like to lose some weight? For some people that's a good motivator for cutting back. Figure out how many calories are in each drink. How many does that add up to in a day? In a week? In a month?     Practice saying no.  Be ready when someone offers you a drink. Try: \"Thanks, I've had enough.\" Or \"Thanks, but I'm cutting back.\" Or \"No, thanks. I feel better when I drink less.\"   Current as of: August 20, 2024  Content Version: 14.5    [id number] [employer].   Care instructions adapted under license by your healthcare professional. If you have questions about a medical condition or this instruction, always ask your healthcare professional. [employer] disclaims any warranty or liability for your use of this information.     "

## (undated) DEVICE — ATTENTION CASE CART PLEASE PICK

## (undated) DEVICE — ESU ELEC NDL 1" E1552

## (undated) DEVICE — SU ETHIBOND 0 CT-1 CR 8X18" CX21D

## (undated) DEVICE — POSITIONER ARMBOARD FOAM 1PAIR LF FP-ARMB1

## (undated) DEVICE — BIT DRILL BIOMET CENTRAL SCR 3.2MM SS 405883

## (undated) DEVICE — SU ETHIBOND 2 V-37 4X30" MX69G

## (undated) DEVICE — THREADED TIP STEINMANN PIN

## (undated) DEVICE — BRUSH SURGICAL SCRUB W/PCMX 4457A

## (undated) DEVICE — SPONGE LAP 18X18" X8435

## (undated) DEVICE — DRAPE U-DRAPE 1015NSD NON-STERILE

## (undated) DEVICE — Device

## (undated) DEVICE — IMM KIT SHOULDER STABILIZATION 7210573

## (undated) DEVICE — PREP DURAPREP 26ML APL 8630

## (undated) DEVICE — SU MONOCRYL 2-0 SH 27" UND Y417H

## (undated) DEVICE — DRSG AQUACEL AG HYDROFIBER  3.5X10" 422605

## (undated) DEVICE — STRAP KNEE/BODY 31143004

## (undated) DEVICE — DECANTER TRANSFER DEVICE 2008S

## (undated) DEVICE — DRAPE POUCH INSTRUMENT 1018

## (undated) DEVICE — GLOVE PROTEXIS W/NEU-THERA 7.5  2D73TE75

## (undated) DEVICE — RESTRAINT LIMB HOLDER ANKLE/WRIST FOAM W/QUICK RELEASE 2533

## (undated) DEVICE — LINEN ORTHO PACK 5446

## (undated) DEVICE — DRAPE U-POUCH 34X29" 1067

## (undated) DEVICE — SYR BULB IRRIG DOVER 60 ML LATEX FREE 67000

## (undated) DEVICE — SOL WATER IRRIG 1000ML BOTTLE 2F7114

## (undated) DEVICE — BIT DRILL BIOMET PERIPHERAL SCR 2.7MM SS 405889

## (undated) DEVICE — SOL NACL 0.9% INJ 1000ML BAG 2B1324X

## (undated) DEVICE — ESU GROUND PAD ADULT W/CORD E7507

## (undated) DEVICE — BONE CLEANING TIP INTERPULSE  0210-010-000

## (undated) DEVICE — DRSG STERI STRIP 1/2X4" R1547

## (undated) DEVICE — GUIDE AUG REAMER ZIM COMPR MINI BUSHING   110031869

## (undated) DEVICE — SOL WATER IRRIG 1000ML BOTTLE 07139-09

## (undated) DEVICE — COVER CAMERA IN-LIGHT DISP LT-C02

## (undated) DEVICE — PACK SET-UP STD 9102

## (undated) DEVICE — DRAIN ROUND W/RESERV KIT JACKSON PRATT 10FR 400ML SU130-402D

## (undated) DEVICE — GOWN XLG DISP 9545

## (undated) DEVICE — LINEN TOWEL PACK X5 5464

## (undated) DEVICE — SOL NACL 0.9% IRRIG 1000ML BOTTLE 2F7124

## (undated) DEVICE — SU MONOCRYL 3-0 PS-1 27" Y936H

## (undated) DEVICE — IMM KIT SHOULDER TMAX MASK FACE 7210559

## (undated) DEVICE — DEVICE RETRIEVER HEWSON 71111579

## (undated) DEVICE — ESU PENCIL W/SMOKE EVAC NEPTUNE STRYKER 0703-046-000

## (undated) DEVICE — DRAPE IOBAN INCISE 23X17" 6650EZ

## (undated) DEVICE — SU SILK 2-0 TIE 12X30" A305H

## (undated) DEVICE — SUCTION MANIFOLD NEPTUNE 2 SYS 4 PORT 0702-020-000

## (undated) DEVICE — SOL HYDROGEN PEROXIDE 3% 4OZ BOTTLE F0010

## (undated) DEVICE — SCREW GUIDE AUGMENT REAM 110040300

## (undated) DEVICE — GLOVE PROTEXIS POWDER FREE 7.5 ORTHOPEDIC 2D73ET75

## (undated) DEVICE — ESU CLEANER TIP 31142717

## (undated) DEVICE — EYE PREP BETADINE 5% SOLUTION 30ML 0065-0411-30

## (undated) DEVICE — SUCTION IRR SYSTEM W/O TIP INTERPULSE HANDPIECE 0210-100-000

## (undated) DEVICE — DRSG TEGADERM 4X4 3/4" 1626W

## (undated) RX ORDER — LIDOCAINE HYDROCHLORIDE 20 MG/ML
INJECTION, SOLUTION EPIDURAL; INFILTRATION; INTRACAUDAL; PERINEURAL
Status: DISPENSED
Start: 2022-09-20

## (undated) RX ORDER — SIMETHICONE 40MG/0.6ML
SUSPENSION, DROPS(FINAL DOSAGE FORM)(ML) ORAL
Status: DISPENSED
Start: 2018-09-14

## (undated) RX ORDER — FENTANYL CITRATE 50 UG/ML
INJECTION, SOLUTION INTRAMUSCULAR; INTRAVENOUS
Status: DISPENSED
Start: 2018-09-14

## (undated) RX ORDER — CEFAZOLIN SODIUM/WATER 2 G/20 ML
SYRINGE (ML) INTRAVENOUS
Status: DISPENSED
Start: 2022-09-20

## (undated) RX ORDER — TRANEXAMIC ACID 650 MG/1
TABLET ORAL
Status: DISPENSED
Start: 2022-09-20

## (undated) RX ORDER — HYDROMORPHONE HYDROCHLORIDE 1 MG/ML
INJECTION, SOLUTION INTRAMUSCULAR; INTRAVENOUS; SUBCUTANEOUS
Status: DISPENSED
Start: 2022-09-20

## (undated) RX ORDER — ONDANSETRON 2 MG/ML
INJECTION INTRAMUSCULAR; INTRAVENOUS
Status: DISPENSED
Start: 2022-09-20

## (undated) RX ORDER — VANCOMYCIN HYDROCHLORIDE 1 G/20ML
INJECTION, POWDER, LYOPHILIZED, FOR SOLUTION INTRAVENOUS
Status: DISPENSED
Start: 2022-09-20

## (undated) RX ORDER — ACETAMINOPHEN 325 MG/1
TABLET ORAL
Status: DISPENSED
Start: 2022-09-20

## (undated) RX ORDER — FENTANYL CITRATE 50 UG/ML
INJECTION, SOLUTION INTRAMUSCULAR; INTRAVENOUS
Status: DISPENSED
Start: 2022-09-20

## (undated) RX ORDER — OXYCODONE HYDROCHLORIDE 5 MG/1
TABLET ORAL
Status: DISPENSED
Start: 2022-09-20

## (undated) RX ORDER — FENTANYL CITRATE 50 UG/ML
INJECTION, SOLUTION INTRAMUSCULAR; INTRAVENOUS
Status: DISPENSED
Start: 2022-08-24